# Patient Record
Sex: MALE | Race: WHITE | NOT HISPANIC OR LATINO | Employment: FULL TIME | URBAN - METROPOLITAN AREA
[De-identification: names, ages, dates, MRNs, and addresses within clinical notes are randomized per-mention and may not be internally consistent; named-entity substitution may affect disease eponyms.]

---

## 2017-05-15 ENCOUNTER — OFFICE VISIT (OUTPATIENT)
Dept: FAMILY MEDICINE | Facility: CLINIC | Age: 52
End: 2017-05-15
Payer: COMMERCIAL

## 2017-05-15 VITALS
HEIGHT: 68 IN | TEMPERATURE: 97.6 F | SYSTOLIC BLOOD PRESSURE: 123 MMHG | HEART RATE: 56 BPM | OXYGEN SATURATION: 98 % | DIASTOLIC BLOOD PRESSURE: 79 MMHG | WEIGHT: 170.4 LBS | BODY MASS INDEX: 25.82 KG/M2

## 2017-05-15 DIAGNOSIS — J30.2 SEASONAL ALLERGIC RHINITIS, UNSPECIFIED ALLERGIC RHINITIS TRIGGER: ICD-10-CM

## 2017-05-15 DIAGNOSIS — Z13.1 SCREENING FOR DIABETES MELLITUS: ICD-10-CM

## 2017-05-15 DIAGNOSIS — F51.01 PRIMARY INSOMNIA: ICD-10-CM

## 2017-05-15 DIAGNOSIS — Z13.220 SCREENING CHOLESTEROL LEVEL: ICD-10-CM

## 2017-05-15 DIAGNOSIS — J33.9 NASAL POLYP: ICD-10-CM

## 2017-05-15 DIAGNOSIS — Z12.11 SPECIAL SCREENING FOR MALIGNANT NEOPLASMS, COLON: ICD-10-CM

## 2017-05-15 DIAGNOSIS — Z00.00 ROUTINE HEALTH MAINTENANCE: Primary | ICD-10-CM

## 2017-05-15 DIAGNOSIS — J45.20 MILD INTERMITTENT ASTHMA WITHOUT COMPLICATION: ICD-10-CM

## 2017-05-15 LAB
CHOLEST SERPL-MCNC: 188 MG/DL
GLUCOSE SERPL-MCNC: 101 MG/DL (ref 70–99)
HDLC SERPL-MCNC: 53 MG/DL
LDLC SERPL CALC-MCNC: 116 MG/DL
NONHDLC SERPL-MCNC: 135 MG/DL
TRIGL SERPL-MCNC: 93 MG/DL

## 2017-05-15 PROCEDURE — 99213 OFFICE O/P EST LOW 20 MIN: CPT | Mod: 25 | Performed by: INTERNAL MEDICINE

## 2017-05-15 PROCEDURE — 82947 ASSAY GLUCOSE BLOOD QUANT: CPT | Performed by: INTERNAL MEDICINE

## 2017-05-15 PROCEDURE — 36415 COLL VENOUS BLD VENIPUNCTURE: CPT | Performed by: INTERNAL MEDICINE

## 2017-05-15 PROCEDURE — 80061 LIPID PANEL: CPT | Performed by: INTERNAL MEDICINE

## 2017-05-15 PROCEDURE — 99396 PREV VISIT EST AGE 40-64: CPT | Performed by: INTERNAL MEDICINE

## 2017-05-15 RX ORDER — ALBUTEROL SULFATE 90 UG/1
2 AEROSOL, METERED RESPIRATORY (INHALATION) EVERY 4 HOURS PRN
Qty: 1 INHALER | Refills: 11 | Status: SHIPPED | OUTPATIENT
Start: 2017-05-15 | End: 2018-08-22

## 2017-05-15 RX ORDER — PREDNISONE 20 MG/1
TABLET ORAL
Qty: 20 TABLET | Refills: 0 | Status: SHIPPED | OUTPATIENT
Start: 2017-05-15 | End: 2018-02-15

## 2017-05-15 NOTE — PROGRESS NOTES
SUBJECTIVE:     CC: Marco Stovall is an 51 year old male who presents for preventative health visit.     Healthy Habits:    Do you get at least three servings of calcium containing foods daily (dairy, green leafy vegetables, etc.)? yes    Amount of exercise or daily activities, outside of work: 3 day(s) per week -  Running     Problems taking medications regularly not applicable    Medication side effects: No    Have you had an eye exam in the past two years? no    Do you see a dentist twice per year? 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 His asthma has been worse recently due to allergies and marathon training.  He is using Flonase and an OTC antihistamine.  Using albuterol inhaler a few times per week.  He is interested in aspirin desensitization.  He has taking a prednisone taper in the past with relief.      Insomnia- Marco has trouble falling asleep at night and has one alcoholic drink per evening to help with this.  He has tried melatonin with no improvement.  Benadryl has a paradoxical effect on him.        ACT Total Scores 6/13/2014 3/24/2016 5/15/2017   ACT TOTAL SCORE 22 - -   ASTHMA ER VISITS 0 = None - -   ASTHMA HOSPITALIZATIONS 0 = None - -   ACT TOTAL SCORE (Goal Greater than or Equal to 20) - 21 18   In the past 12 months, how many times did you visit the emergency room for your asthma without being admitted to the hospital? - 0 0   In the past 12 months, how many times were you hospitalized overnight because of your asthma? - 0 0     colonoscopy    Today's PHQ-2 Score:   PHQ-2 ( 1999 [employer]) 5/15/2017 3/24/2016   Q1: Little interest or pleasure in doing things 0 0   Q2: Feeling down, depressed or hopeless 0 0   PHQ-2 Score 0 0       Abuse: Current or Past(Physical, Sexual or Emotional)- No  Do you feel safe in your environment - Yes    Social History    Substance Use Topics     Smoking status: Never Smoker     Smokeless tobacco: Never Used     Alcohol use Yes      Comment: wine or mixed 2 a month          Standardized Alcohol Screening Questionnaire  AUDIT   Questions 0 1 2 3 4 Score   1. How often do you have a drink  containing alcohol? Never Monthly or less 2 to 4  times a  month 2 to 3  times a  week 4 or more  times a  week  4   2. How many drinks containing alcohol  do you have on a typical day when you are drinking? 1 or 2 3 or 4 5 or 6 7 to 9 10 or more  0   3. How often do you have more than five  or more drinks on one occasion? Never Less  than  monthly Monthly Weekly Daily or  almost  daily  0   4. How often during the last year have  you found that you were not able to stop drinking once you had started? Never Less  than  monthly Monthly Weekly Daily or  almost  daily  0   5. How often during the last year have  you failed to do what was normally expected of you because of drinking? Never Less  than  monthly Monthly Weekly Daily or  almost  daily  0   6. How often during the last year have  you needed a first drink in the morning to get yourself going after a heavy drinking session? Never Less  than  monthly Monthly Weekly Daily or  almost  daily  0   7. How often during the last year have you had a feeling of guilt or remorse after drinking? Never Less  than  monthly Monthly Weekly Daily or  almost  daily  0   8. How often during the last year have  you been unable to remember what happened the night before because of your drinking? Never Less  than  monthly Monthly Weekly Daily or  almost  daily  0   9. Have you or someone else been  injured because of your drinking? No  Yes, but not in the last year  Yes,  during the  last year  0   10. Has a relative, friend, doctor or other health care worker been concerned about your drinking or suggested you cut down? No  Yes, but not in the last year  Yes,  during the  last year  0   Total  4   Scoring: A  score of 7 for adult men is an indication of hazardous drinking (risk for physical or physiological harm); a score of 8 or more is an indication of an alcohol use disorder. A score of 5 or more for adult women  is an indication of hazardous drinking or an alcohol use disorder.       Last PSA: No results found for: PSA    Recent Labs   Lab Test  07/15/11   0933   CHOL  217*   HDL  41   LDL  160*   TRIG  74   CHOLHDLRATIO  5.0       Reviewed orders with patient. Reviewed health maintenance and updated orders accordingly - Yes    Reviewed and updated as needed this visit by clinical staff  Tobacco  Allergies  Med Hx  Surg Hx  Fam Hx  Soc Hx        Reviewed and updated as needed this visit by Provider        Past Medical History:   Diagnosis Date     Mild intermittent asthma without complication 3/24/2016     Polyp of nasal cavity       Past Surgical History:   Procedure Laterality Date     ENT SURGERY       ETHMOIDECTOMY  6/16/2014    Procedure: ETHMOIDECTOMY;  Surgeon: Jimbo Yang MD;  Location: [facility]     SURGICAL HISTORY OF -   2010    Nasal Polyp; 3 total surgeries: 1997       ROS:  C: NEGATIVE for fever, chills, change in weight  I: NEGATIVE for worrisome rashes, moles or lesions  E: NEGATIVE for vision changes or irritation  ENT: Positive for allergic symptoms  R: Positive for asthma symptoms  CV: NEGATIVE for chest pain, palpitations or peripheral edema  GI: NEGATIVE for nausea, abdominal pain, heartburn, or change in bowel habits   male: negative for dysuria, hematuria, decreased urinary stream, erectile dysfunction, urethral discharge  M: NEGATIVE for significant arthralgias or myalgia  N: NEGATIVE for weakness, dizziness or paresthesias  P: NEGATIVE for changes in mood or affect    Current Outpatient Prescriptions   Medication Sig Dispense Refill     albuterol (PROAIR HFA/PROVENTIL HFA/VENTOLIN HFA) 108 (90 BASE) MCG/ACT Inhaler Inhale 2 puffs into the lungs every 4 hours as needed for  "shortness of breath / dyspnea 1 Inhaler 11     predniSONE (DELTASONE) 20 MG tablet Take 3 tabs (60 mg) by mouth daily x 3 days, 2 tabs (40 mg) daily x 3 days, 1 tab (20 mg) daily x 3 days, then 1/2 tab (10 mg) x 3 days. 20 tablet 0     Multiple Vitamin (MULTIVITAMIN) per tablet Take 1 tablet by mouth daily. 100 tablet 12     sildenafil (VIAGRA) 100 MG tablet Take 0.5-1 tablets ( mg) by mouth daily as needed for erectile dysfunction Take 30 min to 4 hours before intercourse. 6 tablet 11     [DISCONTINUED] albuterol (PROAIR HFA, PROVENTIL HFA, VENTOLIN HFA) 108 (90 BASE) MCG/ACT inhaler Inhale 2 puffs into the lungs every 4 hours as needed for shortness of breath / dyspnea 1 Inhaler 11     Allergies   Allergen Reactions     Advil [Ibuprofen Micronized] Other (See Comments)     Tight chest     Asa [Aspirin] Nausea and Vomiting     Asthma - tight chest     Naprosyn [Naproxen] GI Disturbance     Stomach      OBJECTIVE:     /79 (BP Location: Right arm, Patient Position: Chair, Cuff Size: Adult Regular)  Pulse 56  Temp 97.6  F (36.4  C) (Tympanic)  Ht 5' 7.5\" (1.715 m)  Wt 170 lb 6.4 oz (77.3 kg)  SpO2 98%  BMI 26.29 kg/m2  EXAM:  GENERAL: healthy, alert and no distress  EYES: Eyes grossly normal to inspection, PERRL and conjunctivae and sclerae normal  HENT: ear canals and TM's normal, nose and mouth without ulcers or lesions  NECK: no adenopathy, no asymmetry, masses, or scars and thyroid normal to palpation  RESP: lungs clear to auscultation - no rales, rhonchi or wheezes  CV: regular rate and rhythm, normal S1 S2, no S3 or S4, no murmur, click or rub, no peripheral edema   ABDOMEN: soft, nontender, no hepatosplenomegaly, no masses and bowel sounds normal  MS: no gross musculoskeletal defects noted, no edema  SKIN: no suspicious lesions or rashes evident on clothed exam- full skin exam declined by patient  NEURO: Normal strength and tone, mentation intact and speech normal  PSYCH: mentation appears " normal, affect normal/bright    ASSESSMENT/PLAN:     1. Routine health maintenance  2. Screening cholesterol level  3. Screening for diabetes mellitus  4. Screening for colon cancer      Immunizations: [facility]    Lab Studies: lipids and glucose    Colonoscopy: ordered       5. Mild intermittent asthma without complication  6. Seasonal allergic rhinitis, unspecified allergic rhinitis trigger  6. Nasal polyp    Asthma is not particularly well controlled currently.  Discussed resuming daily steroid inhaler, but he wishes to meet with allergist first to discuss aspirin desensitization therapy.  Is currently taking Flonase and OTC antihistamine for allergies.  He reports that he has been on prednisone taper in the past when he gets a flare of his nasal symptoms and he would like to try this again, so order placed.      - albuterol (PROAIR HFA/PROVENTIL HFA/VENTOLIN HFA) 108 (90 BASE) MCG/ACT Inhaler; Inhale 2 puffs into the lungs every 4 hours as needed for shortness of breath / dyspnea  Dispense: 1 Inhaler; Refill: 11  - ALLERGY/ASTHMA ADULT REFERRAL  - predniSONE (DELTASONE) 20 MG tablet; Take 3 tabs (60 mg) by mouth daily x 3 days, 2 tabs (40 mg) daily x 3 days, 1 tab (20 mg) daily x 3 days, then 1/2 tab (10 mg) x 3 days.  Dispense: 20 tablet; Refill: 0    7. Primary insomnia    He has one alcoholic drink nightly before bed to help with sleep and enquires about other sleep aids.  He has tried melatonin with no relief and has paradoxical effect from Benadryl.  We discussed that prescription sleep medications are usually just effective short term and that working on sleep hygiene is best for long term, and he is going to work on improving his sleep hygiene.       COUNSELING:  Reviewed preventive health counseling, as reflected in patient instructions     reports that he has never smoked. He has never used smokeless tobacco.    Estimated body mass index is 26.29 kg/(m^2) as calculated from the following:    Height as of this  "encounter: 5' 7.5\" (1.715 m).    Weight as of this encounter: 170 lb 6.4 oz (77.3 kg).       Rodger Cox MD  [facility]  "

## 2017-05-15 NOTE — NURSING NOTE
"Chief Complaint   Patient presents with     Physical       Initial /79 (BP Location: Right arm, Patient Position: Chair, Cuff Size: Adult Regular)  Pulse 56  Temp 97.6  F (36.4  C) (Tympanic)  Ht 5' 7.5\" (1.715 m)  Wt 170 lb 6.4 oz (77.3 kg)  SpO2 98%  BMI 26.29 kg/m2 Estimated body mass index is 26.29 kg/(m^2) as calculated from the following:    Height as of this encounter: 5' 7.5\" (1.715 m).    Weight as of this encounter: 170 lb 6.4 oz (77.3 kg).  Medication Reconciliation: complete   Nava PLATA CMA (AAMA)    "

## 2017-05-15 NOTE — LETTER
"[facility]  [address] [address]  Phone: [phone]    May 15, 2017    Marco FERRELL Wilman  [address] [place] [id number]          Dear Mr. Solister,    The results of your recent lab tests were: blood sugar is just slightly above the normal range, stable from where it has been the past 5 years.  LDL \"bad\" cholesterol is slightly high, but improved from 5 years ago.  HDL and triglyceride levels are good.   Component      Latest Ref Rng & Units 5/15/2017   Cholesterol      <200 mg/dL 188   Triglycerides      <150 mg/dL 93   HDL Cholesterol      >39 mg/dL 53   LDL Cholesterol Calculated      <100 mg/dL 116 (H)   Non HDL Cholesterol      <130 mg/dL 135 (H)   Glucose      70 - 99 mg/dL 101 (H)     If you have any further questions or problems, please contact our office.    Sincerely,      Andrew Andrade MD / lavon          "

## 2017-05-15 NOTE — PATIENT INSTRUCTIONS
Sleep Hygeine Information (FV, 1 page)     Preventive Health Recommendations  Male Ages 50 - 64    Yearly exam:             See your health care provider every year in order to  o   Review health changes.   o   Discuss preventive care.    o   Review your medicines if your doctor has prescribed any.     Have a cholesterol test every 5 years, or more frequently if you are at risk for high cholesterol/heart disease.     Have a diabetes test (fasting glucose) every three years. If you are at risk for diabetes, you should have this test more often.     Have a colonoscopy at age 50, or have a yearly FIT test (stool test). These exams will check for colon cancer.      Talk with your health care provider about whether or not a prostate cancer screening test (PSA) is right for you.    You should be tested each year for STDs (sexually transmitted diseases), if you re at risk.     Shots: Get a flu shot each year. Get a tetanus shot every 10 years.     Nutrition:    Eat at least 5 servings of fruits and vegetables daily.     Eat whole-grain bread, whole-wheat pasta and brown rice instead of white grains and rice.     Talk to your provider about Calcium and Vitamin D.     Lifestyle    Exercise for at least 150 minutes a week (30 minutes a day, 5 days a week). This will help you control your weight and prevent disease.     Limit alcohol to one drink per day.     No smoking.     Wear sunscreen to prevent skin cancer.     See your dentist every six months for an exam and cleaning.     See your eye doctor every 1 to 2 years.

## 2017-05-15 NOTE — LETTER
My Asthma Action Plan  Name: Marco Stovall   YOB: 1965  Date: 5/15/2017   My doctor: Rodger Cox MD   My clinic: [facility]        My Control Medicine: none  My Rescue Medicine: Albuterol (Proair/Ventolin/Proventil) inhaler     My Asthma Severity: intermittent  Avoid your asthma triggers: exercise or sports and allergens               GREEN ZONE     Good Control    I feel good    No cough or wheeze    Can work, sleep and play without asthma symptoms       Take your asthma control medicine every day.     1. If exercise triggers your asthma, take your rescue medication    15 minutes before exercise or sports, and    During exercise if you have asthma symptoms  2. Spacer to use with inhaler: If you have a spacer, make sure to use it with your inhaler             YELLOW ZONE     Getting Worse  I have ANY of these:    I do not feel good    Cough or wheeze    Chest feels tight    Wake up at night   1. Keep taking your Green Zone medications  2. Start taking your rescue medicine:    every 20 minutes for up to 1 hour. Then every 4 hours for 24-48 hours.  3. If you stay in the Yellow Zone for more than 12-24 hours, contact your doctor.  4. If you do not return to the Green Zone in 12-24 hours or you get worse, start taking your oral steroid medicine if prescribed by your provider.           RED ZONE     Medical Alert - Get Help  I have ANY of these:    I feel awful    Medicine is not helping    Breathing getting harder    Trouble walking or talking    Nose opens wide to breathe       1. Take your rescue medicine NOW  2. If your provider has prescribed an oral steroid medicine, start taking it NOW  3. Call your doctor NOW  4. If you are still in the Red Zone after 20 minutes and you have not reached your doctor:    Take your rescue medicine again and    Call 911 or go to the emergency room right away    See your regular doctor within 2 weeks of an Emergency Room or Urgent Care visit for  follow-up treatment.        Electronically signed by: Rodger Cox, May 15, 2017    Annual Reminders:  Meet with Asthma Educator,  Flu Shot in the Fall, consider Pneumonia Vaccination for patients with asthma (aged 19 and older).    Pharmacy:    [facility] [place] - [address] [id number] - [place], [place] - [address]                    Asthma Triggers  How To Control Things That Make Your Asthma Worse    Triggers are things that make your asthma worse.  Look at the list below to help you find your triggers and what you can do about them.  You can help prevent asthma flare-ups by staying away from your triggers.      Trigger                                                          What you can do   Cigarette Smoke  Tobacco smoke can make asthma worse. Do not allow smoking in your home, car or around you.  Be sure no one smokes at a child s day care or school.  If you smoke, ask your health care provider for ways to help you quit.  Ask family members to quit too.  Ask your health care provider for a referral to Quit Plan to help you quit smoking, or call 6-605-415-PLAN.     Colds, Flu, Bronchitis  These are common triggers of asthma. Wash your hands often.  Don t touch your eyes, nose or mouth.  Get a flu shot every year.     Dust Mites  These are tiny bugs that live in cloth or carpet. They are too small to see. Wash sheets and blankets in hot water every week.   Encase pillows and mattress in dust mite proof covers.  Avoid having carpet if you can. If you have carpet, vacuum weekly.   Use a dust mask and HEPA vacuum.   Pollen and Outdoor Mold  Some people are allergic to trees, grass, or weed pollen, or molds. Try to keep your windows closed.  Limit time out doors when pollen count is high.   Ask you health care provider about taking medicine during allergy season.     Animal Dander  Some people are allergic to skin flakes, urine or saliva from pets with fur or  feathers. Keep pets with fur or feathers out of your home.    If you can t keep the pet outdoors, then keep the pet out of your bedroom.  Keep the bedroom door closed.  Keep pets off cloth furniture and away from stuffed toys.     Mice, Rats, and Cockroaches  Some people are allergic to the waste from these pests.   Cover food and garbage.  Clean up spills and food crumbs.  Store grease in the refrigerator.   Keep food out of the bedroom.   Indoor Mold  This can be a trigger if your home has high moisture. Fix leaking faucets, pipes, or other sources of water.   Clean moldy surfaces.  Dehumidify basement if it is damp and smelly.   Smoke, Strong Odors, and Sprays  These can reduce air quality. Stay away from strong odors and sprays, such as perfume, powder, hair spray, paints, smoke incense, paint, cleaning products, candles and new carpet.   Exercise or Sports  Some people with asthma have this trigger. Be active!  Ask your doctor about taking medicine before sports or exercise to prevent symptoms.    Warm up for 5-10 minutes before and after sports or exercise.     Other Triggers of Asthma  Cold air:  Cover your nose and mouth with a scarf.  Sometimes laughing or crying can be a trigger.  Some medicines and food can trigger asthma.

## 2017-05-15 NOTE — MR AVS SNAPSHOT
After Visit Summary   5/15/2017    Marco Stovall    MRN: [id number]           Patient Information     Date Of Birth          1965        Visit Information        Provider Department      5/15/2017 9:00 AM Rodger Cox MD [facility]        Today's Diagnoses     Routine health maintenance    -  1    Mild intermittent asthma without complication        Seasonal allergic rhinitis, unspecified allergic rhinitis trigger        Nasal polyp        Screening cholesterol level        Screening for diabetes mellitus          Care Instructions      Sleep Hygeine Information (FV, 1 page)     Preventive Health Recommendations  Male Ages 50 - 64    Yearly exam:             See your health care provider every year in order to  o   Review health changes.   o   Discuss preventive care.    o   Review your medicines if your doctor has prescribed any.     Have a cholesterol test every 5 years, or more frequently if you are at risk for high cholesterol/heart disease.     Have a diabetes test (fasting glucose) every three years. If you are at risk for diabetes, you should have this test more often.     Have a colonoscopy at age 50, or have a yearly FIT test (stool test). These exams will check for colon cancer.      Talk with your health care provider about whether or not a prostate cancer screening test (PSA) is right for you.    You should be tested each year for STDs (sexually transmitted diseases), if you re at risk.     Shots: Get a flu shot each year. Get a tetanus shot every 10 years.     Nutrition:    Eat at least 5 servings of fruits and vegetables daily.     Eat whole-grain bread, whole-wheat pasta and brown rice instead of white grains and rice.     Talk to your provider about Calcium and Vitamin D.     Lifestyle    Exercise for at least 150 minutes a week (30 minutes a day, 5 days a week). This will help you control your weight and prevent disease.     Limit alcohol to one drink per day.  "    No smoking.     Wear sunscreen to prevent skin cancer.     See your dentist every six months for an exam and cleaning.     See your eye doctor every 1 to 2 years.          Follow-ups after your visit        Additional Services     ALLERGY/ASTHMA ADULT REFERRAL       Your provider has referred you to: [facility]: [facility] [phone] [email or web address]    Please be aware that coverage of these services is subject to the terms and limitations of your health insurance plan.  Call member services at your health plan with any benefit or coverage questions.      Please bring the following with you to your appointment:    (1) Any X-Rays, CTs or MRIs which have been performed.  Contact the facility where they were done to arrange for  prior to your scheduled appointment.    (2) List of current medications  (3) This referral request   (4) Any documents/labs given to you for this referral                  Who to contact     If you have questions or need follow up information about today's clinic visit or your schedule please contact [facility] directly at [phone].  Normal or non-critical lab and imaging results will be communicated to you by MyChart, letter or phone within 4 business days after the clinic has received the results. If you do not hear from us within 7 days, please contact the clinic through [employer] or phone. If you have a critical or abnormal lab result, we will notify you by phone as soon as possible.  Submit refill requests through [employer] or call your pharmacy and they will forward the refill request to us. Please allow 3 business days for your refill to be completed.          Additional Information About Your Visit        [employer] Information     [employer] lets you send messages to your doctor, view your test results, renew your prescriptions, schedule appointments and more. To sign up, go to [email or web address] . Click on \"Log in\" on " "the left side of the screen, which will take you to the Welcome page. Then click on \"Sign up Now\" on the right side of the page.     You will be asked to enter the access code listed below, as well as some personal information. Please follow the directions to create your username and password.     Your access code is: [email or web address]  Expires: 2017  9:39 AM     Your access code will  in 90 days. If you need help or a new code, please call your [facility] clinic or [phone].        Care EveryWhere ID     This is your Care EveryWhere ID. This could be used by other organizations to access your [facility] medical records  [id number]        Your Vitals Were     Pulse Temperature Height Pulse Oximetry BMI (Body Mass Index)       56 97.6  F (36.4  C) (Tympanic) 5' 7.5\" (1.715 m) 98% 26.29 kg/m2        Blood Pressure from Last 3 Encounters:   05/15/17 123/79   16 133/77   16 141/85    Weight from Last 3 Encounters:   05/15/17 170 lb 6.4 oz (77.3 kg)   16 187 lb (84.8 kg)   16 198 lb (89.8 kg)              We Performed the Following     ALLERGY/ASTHMA ADULT REFERRAL     Glucose     Lipid Profile          Today's Medication Changes          These changes are accurate as of: 5/15/17  9:39 AM.  If you have any questions, ask your nurse or doctor.               Start taking these medicines.        Dose/Directions    predniSONE 20 MG tablet   Commonly known as:  DELTASONE   Used for:  Seasonal allergic rhinitis, unspecified allergic rhinitis trigger, Nasal polyp   Started by:  Rodger Cox MD        Take 3 tabs (60 mg) by mouth daily x 3 days, 2 tabs (40 mg) daily x 3 days, 1 tab (20 mg) daily x 3 days, then 1/2 tab (10 mg) x 3 days.   Quantity:  20 tablet   Refills:  0            Where to get your medicines      These medications were sent to [facility] [place], [place] - [address]  [address] [address]     Phone:  [phone]     albuterol 108 (90 BASE) " MCG/ACT Inhaler    predniSONE 20 MG tablet                Primary Care Provider Office Phone # Fax #    Andrew Serg Andrade [phone]       [facility] [address] [address]        Thank you!     Thank you for choosing [facility]  for your care. Our goal is always to provide you with excellent care. Hearing back from our patients is one way we can continue to improve our services. Please take a few minutes to complete the written survey that you may receive in the mail after your visit with us. Thank you!             Your Updated Medication List - Protect others around you: Learn how to safely use, store and throw away your medicines at www.disposemymeds.org.          This list is accurate as of: 5/15/17  9:39 AM.  Always use your most recent med list.                   Brand Name Dispense Instructions for use    albuterol 108 (90 BASE) MCG/ACT Inhaler    PROAIR HFA/PROVENTIL HFA/VENTOLIN HFA    1 Inhaler    Inhale 2 puffs into the lungs every 4 hours as needed for shortness of breath / dyspnea       multivitamin per tablet     100 tablet    Take 1 tablet by mouth daily.       predniSONE 20 MG tablet    DELTASONE    20 tablet    Take 3 tabs (60 mg) by mouth daily x 3 days, 2 tabs (40 mg) daily x 3 days, 1 tab (20 mg) daily x 3 days, then 1/2 tab (10 mg) x 3 days.       sildenafil 100 MG cap/tab    VIAGRA    6 tablet    Take 0.5-1 tablets ( mg) by mouth daily as needed for erectile dysfunction Take 30 min to 4 hours before intercourse.

## 2017-05-16 ASSESSMENT — ASTHMA QUESTIONNAIRES: ACT_TOTALSCORE: 18

## 2017-06-22 ENCOUNTER — TELEPHONE (OUTPATIENT)
Dept: FAMILY MEDICINE | Facility: CLINIC | Age: 52
End: 2017-06-22

## 2017-06-22 NOTE — LETTER
[facility]  [address] [address]  Phone: [phone]    June 22, 2017    Marco MARIAELENA Stovall  [address] [place] [address]              Dear Mr. Solister,    Your [facility] Care Team works hard to make sure that you and your family receive exceptional care. Enclosed you will find a copy of the Asthma Control Test (ACT) that our clinic uses to monitor and manage your asthma. This test is an assessment tool that we use to determine how well your asthma is controlled.  Please keep a copy of the ACT for your reference when we call you to complete this assessment.   We will call you within the next week to review the ACT.    Thank you for trusting us with your health care.    Sincerely,      Your [facility] Team/pati

## 2017-06-22 NOTE — TELEPHONE ENCOUNTER
Patient is due for ACT.  Last done 5/15/17 with a score of 18.  Per office visit from 5/15/17:    5. Mild intermittent asthma without complication  6. Seasonal allergic rhinitis, unspecified allergic rhinitis trigger  6. Nasal polyp     Asthma is not particularly well controlled currently.  Discussed resuming daily steroid inhaler, but he wishes to meet with allergist first to discuss aspirin desensitization therapy.  Is currently taking Flonase and OTC antihistamine for allergies.  He reports that he has been on prednisone taper in the past when he gets a flare of his nasal symptoms and he would like to try this again, so order placed.      Letter mailed to patient with ACT.

## 2017-06-22 NOTE — LETTER
[facility]  [address] [address]  Phone: [phone]    July 13, 2017    Marco FERRELL Wilman  [address] [place] [address]              Dear Gricel Wilman,    Your [facility] Care Team works hard to make sure that you and your family receive exceptional care. Enclosed you will find a copy of the Asthma Control Test (ACT) that our clinic uses to monitor and manage your asthma. This test is an assessment tool that we use to determine how well your asthma is controlled.  Please complete the enclosed ACT and return in the provided envelope.    Our records also indicate you are do for a colonoscopy.  This is a colon cancer screening test that is recommended beginning at age 50.  Please contact the clinic for assistance in scheduling this test.    Thank you for trusting us with your health care.      Sincerely,      Your [facility] Care Team/pati

## 2017-07-13 NOTE — TELEPHONE ENCOUNTER
Panel Management Review      Patient has the following on his problem list:     Asthma review     ACT Total Scores 5/15/2017   ACT TOTAL SCORE -   ASTHMA ER VISITS -   ASTHMA HOSPITALIZATIONS -   ACT TOTAL SCORE (Goal Greater than or Equal to 20) 18   In the past 12 months, how many times did you visit the emergency room for your asthma without being admitted to the hospital? 0   In the past 12 months, how many times were you hospitalized overnight because of your asthma? 0      1. Is Asthma diagnosis on the Problem List? Yes    2. Is Asthma listed on Health Maintenance? Yes    3. Patient is due for:  ACT      Composite cancer screening  Chart review shows that this patient is due/due soon for the following Colonoscopy  Summary:    Patient is due/failing the following:   ACT    Action needed:   Patient needs to do ACT.    Type of outreach:    Sent letter.   Letter with ACT mailed for patient to complete and return.  Also reminded due for colonoscopy.    Questions for provider review:    None                                                                                                                                    AYSHA Garnett, CMA

## 2017-11-02 ENCOUNTER — OFFICE VISIT (OUTPATIENT)
Dept: FAMILY MEDICINE | Facility: CLINIC | Age: 52
End: 2017-11-02
Payer: COMMERCIAL

## 2017-11-02 VITALS
TEMPERATURE: 96.7 F | SYSTOLIC BLOOD PRESSURE: 116 MMHG | HEART RATE: 67 BPM | WEIGHT: 162 LBS | HEIGHT: 68 IN | BODY MASS INDEX: 24.55 KG/M2 | DIASTOLIC BLOOD PRESSURE: 83 MMHG

## 2017-11-02 DIAGNOSIS — Z23 NEED FOR PROPHYLACTIC VACCINATION AND INOCULATION AGAINST INFLUENZA: ICD-10-CM

## 2017-11-02 DIAGNOSIS — Z12.11 COLON CANCER SCREENING: ICD-10-CM

## 2017-11-02 DIAGNOSIS — N52.9 ERECTILE DYSFUNCTION, UNSPECIFIED ERECTILE DYSFUNCTION TYPE: ICD-10-CM

## 2017-11-02 DIAGNOSIS — Z11.3 SCREEN FOR STD (SEXUALLY TRANSMITTED DISEASE): Primary | ICD-10-CM

## 2017-11-02 LAB — HIV 1+2 AB+HIV1 P24 AG SERPL QL IA: NONREACTIVE

## 2017-11-02 PROCEDURE — 87491 CHLMYD TRACH DNA AMP PROBE: CPT | Performed by: FAMILY MEDICINE

## 2017-11-02 PROCEDURE — 99214 OFFICE O/P EST MOD 30 MIN: CPT | Performed by: FAMILY MEDICINE

## 2017-11-02 PROCEDURE — 90471 IMMUNIZATION ADMIN: CPT | Performed by: FAMILY MEDICINE

## 2017-11-02 PROCEDURE — 86696 HERPES SIMPLEX TYPE 2 TEST: CPT | Performed by: FAMILY MEDICINE

## 2017-11-02 PROCEDURE — 87389 HIV-1 AG W/HIV-1&-2 AB AG IA: CPT | Performed by: FAMILY MEDICINE

## 2017-11-02 PROCEDURE — 86695 HERPES SIMPLEX TYPE 1 TEST: CPT | Performed by: FAMILY MEDICINE

## 2017-11-02 PROCEDURE — 90686 IIV4 VACC NO PRSV 0.5 ML IM: CPT | Performed by: FAMILY MEDICINE

## 2017-11-02 PROCEDURE — 86780 TREPONEMA PALLIDUM: CPT | Performed by: FAMILY MEDICINE

## 2017-11-02 PROCEDURE — 87591 N.GONORRHOEAE DNA AMP PROB: CPT | Performed by: FAMILY MEDICINE

## 2017-11-02 PROCEDURE — 36415 COLL VENOUS BLD VENIPUNCTURE: CPT | Performed by: FAMILY MEDICINE

## 2017-11-02 RX ORDER — SILDENAFIL CITRATE 20 MG/1
TABLET ORAL
Qty: 30 TABLET | Refills: 11 | Status: SHIPPED | OUTPATIENT
Start: 2017-11-02 | End: 2018-08-22

## 2017-11-02 NOTE — PROGRESS NOTES
"  SUBJECTIVE:   Marco Stovall is a 52 year old male who presents to clinic today for the following health issues:      STD TESTING:  Here to discuss about STD testing.  Patient states no exposure, he is just in a new relationship.  No hx of known STD's and no symptoms.     He is interested in a colonoscopy.     He needs a refill on the Viagra.       Current Outpatient Prescriptions:      albuterol (PROAIR HFA/PROVENTIL HFA/VENTOLIN HFA) 108 (90 BASE) MCG/ACT Inhaler, Inhale 2 puffs into the lungs every 4 hours as needed for shortness of breath / dyspnea, Disp: 1 Inhaler, Rfl: 11     predniSONE (DELTASONE) 20 MG tablet, Take 3 tabs (60 mg) by mouth daily x 3 days, 2 tabs (40 mg) daily x 3 days, 1 tab (20 mg) daily x 3 days, then 1/2 tab (10 mg) x 3 days., Disp: 20 tablet, Rfl: 0     sildenafil (VIAGRA) 100 MG tablet, Take 0.5-1 tablets ( mg) by mouth daily as needed for erectile dysfunction Take 30 min to 4 hours before intercourse., Disp: 6 tablet, Rfl: 11     Multiple Vitamin (MULTIVITAMIN) per tablet, Take 1 tablet by mouth daily., Disp: 100 tablet, Rfl: 12    Patient Active Problem List   Diagnosis     Screening for hypertension     Chronic maxillary sinusitis     Hyperlipidemia LDL goal <130     Mild intermittent asthma without complication       Blood pressure 116/83, pulse 67, temperature 96.7  F (35.9  C), temperature source Tympanic, height 5' 7.5\" (1.715 m), weight 162 lb (73.5 kg).    Exam:  GENERAL APPEARANCE: healthy, alert and no distress  EYES: EOMI,  PERRL  CV: regular rates and rhythm, normal S1 S2, no S3 or S4 and no murmur, click or rub -  GU_male: testicles normal without atrophy or masses, no hernias and penis normal without urethral discharge  PSYCH: mentation appears normal and affect normal/bright      (Z11.3) Screen for STD (sexually transmitted disease)  (primary encounter diagnosis)  Comment:   Plan: Anti Treponema, NEISSERIA GONORRHOEA PCR,         CHLAMYDIA TRACHOMATIS PCR, HIV " 1 and 2 Yancy         ([facility]), Herpes Simplex Virus 1 and 2 IgG        We will do the above tests and notify of the results. If these are negative then follow up as needed.     (Z12.11) Colon cancer screening  Comment:   Plan: COLORECTAL SURGERY REFERRAL        Call for the procedure. Monitor the stools for changes.     (N52.9) Erectile dysfunction, unspecified erectile dysfunction type  Comment:   Plan: sildenafil (REVATIO) 20 MG tablet        Instructions given on diagnoses and refill is done for one year. Use the smallest effective dose.     Andrew Andrade

## 2017-11-02 NOTE — PATIENT INSTRUCTIONS
Thank you for choosing [facility].  You may be receiving a survey in the mail from Augustine Yousif regarding your visit today.  Please take a few minutes to complete and return the survey to let us know how we are doing.      If you have questions or concerns, please contact us via [employer] or you can contact your care team at [phone].    Our Clinic hours are:  Monday 6:40 am  to 7:00 pm  Tuesday -Friday 6:40 am to 5:00 pm    The [place] outpatient lab hours are:  Monday - Friday 6:10 am to 4:45 pm  Saturdays 7:00 am to 11:00 am  Appointments are required, call [phone]    If you have clinical questions after hours or would like to schedule an appointment,  call the clinic at [phone].      (Z11.3) Screen for STD (sexually transmitted disease)  (primary encounter diagnosis)  Comment:   Plan: Anti Treponema, NEISSERIA GONORRHOEA PCR,         CHLAMYDIA TRACHOMATIS PCR, HIV 1 and 2 Yancy         ([facility]), Herpes Simplex Virus 1 and 2 IgG        We will do the above tests and notify of the results. If these are negative then follow up as needed.     (Z12.11) Colon cancer screening  Comment:   Plan: COLORECTAL SURGERY REFERRAL        Call for the procedure. Monitor the stools for changes.     (N52.9) Erectile dysfunction, unspecified erectile dysfunction type  Comment:   Plan: sildenafil (REVATIO) 20 MG tablet        Instructions given on diagnoses and refill is done for one year. Use the smallest effective dose.

## 2017-11-02 NOTE — NURSING NOTE
"Chief Complaint   Patient presents with     STD Testing     Here to discuss about testing.       Initial /83  Pulse 67  Temp 96.7  F (35.9  C) (Tympanic)  Ht 5' 7.5\" (1.715 m)  Wt 162 lb (73.5 kg)  BMI 25 kg/m2 Estimated body mass index is 25 kg/(m^2) as calculated from the following:    Height as of this encounter: 5' 7.5\" (1.715 m).    Weight as of this encounter: 162 lb (73.5 kg).  Medication Reconciliation: complete  "

## 2017-11-02 NOTE — MR AVS SNAPSHOT
After Visit Summary   11/2/2017    Marco Stovall    MRN: [id number]           Patient Information     Date Of Birth          1965        Visit Information        Provider Department      11/2/2017 6:40 AM Andrew Andrade MD [facility]        Today's Diagnoses     Screen for STD (sexually transmitted disease)    -  1    Colon cancer screening        Erectile dysfunction, unspecified erectile dysfunction type          Care Instructions          Thank you for choosing [facility].  You may be receiving a survey in the mail from [facility] regarding your visit today.  Please take a few minutes to complete and return the survey to let us know how we are doing.      If you have questions or concerns, please contact us via [employer] or you can contact your care team at [phone].    Our Clinic hours are:  Monday 6:40 am  to 7:00 pm  Tuesday -Friday 6:40 am to 5:00 pm    The [place] outpatient lab hours are:  Monday - Friday 6:10 am to 4:45 pm  Saturdays 7:00 am to 11:00 am  Appointments are required, call [phone]    If you have clinical questions after hours or would like to schedule an appointment,  call the clinic at [phone].      (Z11.3) Screen for STD (sexually transmitted disease)  (primary encounter diagnosis)  Comment:   Plan: Anti Treponema, NEISSERIA GONORRHOEA PCR,         CHLAMYDIA TRACHOMATIS PCR, HIV 1 and 2 Yancy         ([facility]), Herpes Simplex Virus 1 and 2 IgG        We will do the above tests and notify of the results. If these are negative then follow up as needed.     (Z12.11) Colon cancer screening  Comment:   Plan: COLORECTAL SURGERY REFERRAL        Call for the procedure. Monitor the stools for changes.     (N52.9) Erectile dysfunction, unspecified erectile dysfunction type  Comment:   Plan: sildenafil (REVATIO) 20 MG tablet        Instructions given on diagnoses and refill is done for one year. Use the smallest effective dose.            Follow-ups after your visit        Additional Services     COLORECTAL SURGERY REFERRAL       Your provider has referred you to: BULMARO Magallanes at [phone].     Referral ReasonsColon Cancer  Special concerns None  This referral is Elective (week +)  It is OK to leave a message on patient's voicemail.    Please be aware that coverage of these services is subject to the terms and limitations of your health insurance plan.  Call member services at your health plan with any benefit or coverage questions.      Please bring the following to your appointment:  >>   Any x-rays, CTs or MRIs which have been performed.  Contact the facility where they were done to arrange for  prior to your scheduled appointment.  Any new CT, MRI or other procedures ordered by your specialist must be performed at a [facility] facility or coordinated by your clinic's referral office.   >>   List of current medications  >>   This referral request   >>   Any documents/labs given to you for this referral                  Who to contact     If you have questions or need follow up information about today's clinic visit or your schedule please contact [facility] directly at [phone].  Normal or non-critical lab and imaging results will be communicated to you by [employer], letter or phone within 4 business days after the clinic has received the results. If you do not hear from us within 7 days, please contact the clinic through [employer] or phone. If you have a critical or abnormal lab result, we will notify you by phone as soon as possible.  Submit refill requests through [employer] or call your pharmacy and they will forward the refill request to us. Please allow 3 business days for your refill to be completed.          Additional Information About Your Visit        [employer] Information     [employer] lets you send messages to your doctor, view your test results, renew your prescriptions, schedule appointments and more. To sign up, go to  "[email or web address] . Click on \"Log in\" on the left side of the screen, which will take you to the Welcome page. Then click on \"Sign up Now\" on the right side of the page.     You will be asked to enter the access code listed below, as well as some personal information. Please follow the directions to create your username and password.     Your access code is: [email or web address]  Expires: 2018  7:22 AM     Your access code will  in 90 days. If you need help or a new code, please call your [place] clinic or [phone].        Care EveryWhere ID     This is your Care EveryWhere ID. This could be used by other organizations to access your [facility] medical records  [id number]        Your Vitals Were     Pulse Temperature Height BMI (Body Mass Index)          67 96.7  F (35.9  C) (Tympanic) 5' 7.5\" (1.715 m) 25 kg/m2         Blood Pressure from Last 3 Encounters:   17 116/83   05/15/17 123/79   16 133/77    Weight from Last 3 Encounters:   17 162 lb (73.5 kg)   05/15/17 170 lb 6.4 oz (77.3 kg)   16 187 lb (84.8 kg)              We Performed the Following     Anti Treponema     CHLAMYDIA TRACHOMATIS PCR     COLORECTAL SURGERY REFERRAL     Herpes Simplex Virus 1 and 2 IgG     HIV 1 and 2 Yancy ([facility])     NEISSERIA GONORRHOEA PCR          Today's Medication Changes          These changes are accurate as of: 17  7:22 AM.  If you have any questions, ask your nurse or doctor.               Start taking these medicines.        Dose/Directions    sildenafil 20 MG tablet   Commonly known as:  REVATIO   Used for:  Erectile dysfunction, unspecified erectile dysfunction type   Started by:  Andrew Andrade MD        Take 1 tablet (20 mg) by mouth three times daily for pulmonary hypertension.  Never use with nitroglycerin, terazosin or doxazosin.   Quantity:  30 tablet   Refills:  11            Where to get your medicines      These medications were sent to [facility] [place] - " [place], [place] - [address]  [address] [address]     Phone:  [phone]     sildenafil 20 MG tablet                Primary Care Provider Office Phone # Fax #    Andrew Andrade [phone]       [address] [address]        Equal Access to Services     OK YEAGER : Hadii aad ku hadasho Soomaali, waaxda luqadaha, qaybta kaalmada adeegyada, waxay idiin hayaan adeeg kharash laaniln . So [facility] [phone].    ATENCIÓN: Si habla español, tiene a adame disposición servicios gratuitos de asistencia lingüística. Lisbethame al [phone].    We comply with applicable federal civil rights laws and [place] laws. We do not discriminate on the basis of race, color, national origin, age, disability, sex, sexual orientation, or gender identity.            Thank you!     Thank you for choosing [facility]  for your care. Our goal is always to provide you with excellent care. Hearing back from our patients is one way we can continue to improve our services. Please take a few minutes to complete the written survey that you may receive in the mail after your visit with us. Thank you!             Your Updated Medication List - Protect others around you: Learn how to safely use, store and throw away your medicines at www.disposemymeds.org.          This list is accurate as of: 11/2/17  7:22 AM.  Always use your most recent med list.                   Brand Name Dispense Instructions for use Diagnosis    albuterol 108 (90 BASE) MCG/ACT Inhaler    PROAIR HFA/PROVENTIL HFA/VENTOLIN HFA    1 Inhaler    Inhale 2 puffs into the lungs every 4 hours as needed for shortness of breath / dyspnea    Mild intermittent asthma without complication       multivitamin per tablet     100 tablet    Take 1 tablet by mouth daily.    Routine general medical examination at a health care facility       predniSONE 20 MG tablet    DELTASONE    20 tablet    Take 3 tabs (60 mg) by mouth daily  x 3 days, 2 tabs (40 mg) daily x 3 days, 1 tab (20 mg) daily x 3 days, then 1/2 tab (10 mg) x 3 days.    Seasonal allergic rhinitis, unspecified allergic rhinitis trigger, Nasal polyp       sildenafil 100 MG tablet    VIAGRA    6 tablet    Take 0.5-1 tablets ( mg) by mouth daily as needed for erectile dysfunction Take 30 min to 4 hours before intercourse.    Erectile dysfunction, unspecified erectile dysfunction type       sildenafil 20 MG tablet    REVATIO    30 tablet    Take 1 tablet (20 mg) by mouth three times daily for pulmonary hypertension.  Never use with nitroglycerin, terazosin or doxazosin.    Erectile dysfunction, unspecified erectile dysfunction type

## 2017-11-02 NOTE — PROGRESS NOTES

## 2017-11-03 LAB
C TRACH DNA SPEC QL NAA+PROBE: NEGATIVE
HSV1 IGG SERPL QL IA: 0.3 AI (ref 0–0.8)
HSV2 IGG SERPL QL IA: <0.2 AI (ref 0–0.8)
N GONORRHOEA DNA SPEC QL NAA+PROBE: NEGATIVE
SPECIMEN SOURCE: NORMAL
SPECIMEN SOURCE: NORMAL
T PALLIDUM IGG+IGM SER QL: NEGATIVE

## 2017-11-03 ASSESSMENT — ASTHMA QUESTIONNAIRES: ACT_TOTALSCORE: 25

## 2017-11-08 ENCOUNTER — TELEPHONE (OUTPATIENT)
Dept: PEDIATRICS | Facility: CLINIC | Age: 52
End: 2017-11-08

## 2017-11-08 NOTE — TELEPHONE ENCOUNTER
Duplicate message.  Patient also has lab results notes and patient has been called and message left for him.    Elvia Gann RN

## 2017-11-08 NOTE — TELEPHONE ENCOUNTER
Reason for Call:  results    Detailed comments: labs from his office visit. I see they are resulted, but Dr. Andrade, has not seen them. Please call paitent with results, when they are read.    Phone Number Patient can be reached at: Home number on file [phone] (home)    Best Time: any    Can we leave a detailed message on this number? YES   Julia Beauchamp  [facility]      Call taken on 11/8/2017 at 1:55 PM by Julia Beauchamp

## 2017-11-30 ENCOUNTER — HOSPITAL ENCOUNTER (EMERGENCY)
Facility: CLINIC | Age: 52
Discharge: HOME | End: 2017-11-30
Attending: NURSE PRACTITIONER | Admitting: NURSE PRACTITIONER
Payer: COMMERCIAL

## 2017-11-30 VITALS
OXYGEN SATURATION: 97 % | HEART RATE: 73 BPM | BODY MASS INDEX: 25 KG/M2 | WEIGHT: 162 LBS | RESPIRATION RATE: 18 BRPM | TEMPERATURE: 99.4 F | DIASTOLIC BLOOD PRESSURE: 90 MMHG | SYSTOLIC BLOOD PRESSURE: 152 MMHG

## 2017-11-30 DIAGNOSIS — J02.0 ACUTE STREPTOCOCCAL PHARYNGITIS: ICD-10-CM

## 2017-11-30 LAB
INTERNAL QC OK POCT: YES
S PYO AG THROAT QL IA.RAPID: ABNORMAL

## 2017-11-30 PROCEDURE — 87880 STREP A ASSAY W/OPTIC: CPT | Performed by: PHYSICIAN ASSISTANT

## 2017-11-30 PROCEDURE — 99213 OFFICE O/P EST LOW 20 MIN: CPT

## 2017-11-30 PROCEDURE — 99213 OFFICE O/P EST LOW 20 MIN: CPT | Performed by: NURSE PRACTITIONER

## 2017-11-30 RX ORDER — PENICILLIN V POTASSIUM 500 MG/1
500 TABLET, FILM COATED ORAL 2 TIMES DAILY
Qty: 20 TABLET | Refills: 0 | Status: SHIPPED | OUTPATIENT
Start: 2017-11-30 | End: 2017-12-10

## 2017-11-30 NOTE — ED AVS SNAPSHOT
[facility] Emergency Department    [address] [address]    Phone:  [phone]    Fax:  [phone]                                       Marco Stovall   MRN: [id number]    Department:  [facility] Emergency Department   Date of Visit:  11/30/2017           After Visit Summary Signature Page     I have received my discharge instructions, and my questions have been answered. I have discussed any challenges I see with this plan with the nurse or doctor.    ..........................................................................................................................................  Patient/Patient Representative Signature      ..........................................................................................................................................  Patient Representative Print Name and Relationship to Patient    ..................................................               ................................................  Date                                            Time    ..........................................................................................................................................  Reviewed by Signature/Title    ...................................................              ..............................................  Date                                                            Time

## 2017-11-30 NOTE — ED AVS SNAPSHOT
[facility] Emergency Department    [address] [address]    Phone:  [phone]    Fax:  [phone]                                       Marco Stovall   MRN: [id number]    Department:  [facility] Emergency Department   Date of Visit:  11/30/2017           Patient Information     Date Of Birth          1965        Your diagnoses for this visit were:     Acute streptococcal pharyngitis        You were seen by Verna Mcmillan APRN CNP.      Follow-up Information     Follow up with Andrew Andrade MD.    Specialty:  Family Practice    Why:  As needed    Contact information:    [address] [address]  [phone]          Discharge Instructions         Pharyngitis: Strep (Confirmed)    You have had a positive test for strep throat. Strep throat is a contagious illness. It is spread by coughing, kissing or by touching others after touching your mouth or nose. Symptoms include throat pain that is worse with swallowing, aching all over, headache, and fever. It is treated with antibiotic medicine. This should help you start to feel better in 1 to 2 days.  Home care    Rest at home. Drink plenty of fluids to you won't get dehydrated.    No work or school for the first 2 days of taking the antibiotics. After this time, you will not be contagious. You can then return to school or work if you are feeling better.     Take antibiotic medicine for the full 10 days, even if you feel better. This is very important to ensure the infection is treated. It is also important to prevent medicine-resistant germs from developing. If you were given an antibiotic shot, you don't need any more antibiotics.    You may use acetaminophen or ibuprofen to control pain or fever, unless another medicine was prescribed for this. Talk with your doctor before taking these medicines if you have chronic liver or kidney disease. Also talk with your doctor if you have had a stomach  ulcer or GI bleeding.    Throat lozenges or sprays help reduce pain. Gargling with warm saltwater will also reduce throat pain. Dissolve 1/2 teaspoon of salt in 1 glass of warm water. This may be useful just before meals.     Soft foods are OK. Avoid salty or spicy foods.  Follow-up care  Follow up with your healthcare provider or our staff if you don't get better over the next week.  When to seek medical advice  Call your healthcare provider right away if any of these occur:    Fever of 100.4 F (38 C) or higher, or as directed by your healthcare provider    New or worsening ear pain, sinus pain, or headache    Painful lumps in the back of neck    Stiff neck    Lymph nodes getting larger or becoming soft in the middle    You can't swallow liquids or you can't open your mouth wide because of throat pain    Signs of dehydration. These include very dark urine or no urine, sunken eyes, and dizziness.    Trouble breathing or noisy breathing    Muffled voice    Rash  Date Last Reviewed: 4/13/2015 2000-2017 [employer]. [address] [place] [address]. All rights reserved. This information is not intended as a substitute for professional medical care. Always follow your healthcare professional's instructions.          24 Hour Appointment Hotline       To make an appointment at any [facility] clinic, call [phone] ([phone]). If you don't have a family doctor or clinic, we will help you find one. [facility] clinics are conveniently located to serve the needs of you and your family.             Review of your medicines      START taking        Dose / Directions Last dose taken    penicillin V potassium 500 MG tablet   Commonly known as:  VEETID   Dose:  500 mg   Quantity:  20 tablet        Take 1 tablet (500 mg) by mouth 2 times daily for 10 days   Refills:  0          Our records show that you are taking the medicines listed below. If these are incorrect, please call your family doctor or  clinic.        Dose / Directions Last dose taken    albuterol 108 (90 BASE) MCG/ACT Inhaler   Commonly known as:  PROAIR HFA/PROVENTIL HFA/VENTOLIN HFA   Dose:  2 puff   Quantity:  1 Inhaler        Inhale 2 puffs into the lungs every 4 hours as needed for shortness of breath / dyspnea   Refills:  11        multivitamin per tablet   Dose:  1 tablet   Quantity:  100 tablet        Take 1 tablet by mouth daily.   Refills:  12        predniSONE 20 MG tablet   Commonly known as:  DELTASONE   Quantity:  20 tablet        Take 3 tabs (60 mg) by mouth daily x 3 days, 2 tabs (40 mg) daily x 3 days, 1 tab (20 mg) daily x 3 days, then 1/2 tab (10 mg) x 3 days.   Refills:  0        sildenafil 100 MG tablet   Commonly known as:  VIAGRA   Dose:   mg   Quantity:  6 tablet        Take 0.5-1 tablets ( mg) by mouth daily as needed for erectile dysfunction Take 30 min to 4 hours before intercourse.   Refills:  11        sildenafil 20 MG tablet   Commonly known as:  REVATIO   Quantity:  30 tablet        Take 1 tablet (20 mg) by mouth three times daily for pulmonary hypertension.  Never use with nitroglycerin, terazosin or doxazosin.   Refills:  11                Prescriptions were sent or printed at these locations (1 Prescription)                   [facility] [address] [address]    Telephone:  [phone]   Fax:  [phone]   Hours:                  E-Prescribed (1 of 1)         penicillin V potassium (VEETID) 500 MG tablet                Procedures and tests performed during your visit     Rapid strep group A screen POCT      Orders Needing Specimen Collection     None      Pending Results     No orders found from 11/28/2017 to 12/1/2017.            Pending Culture Results     No orders found from 11/28/2017 to 12/1/2017.            Pending Results Instructions     If you had any lab results that were not finalized at the time of your Discharge, you can  "call the ED Lab Result RN at [phone]. You will be contacted by this team for any positive Lab results or changes in treatment. The nurses are available 7 days a week from 10A to 6:30P.  You can leave a message 24 hours per day and they will return your call.        Test Results From Your Hospital Stay        2017  6:17 PM      Component Results     Component Value Ref Range & Units Status    Rapid Strep A Screen POS neg Final    Internal QC OK Yes  Final                Thank you for choosing [facility]       Thank you for choosing [facility] for your care. Our goal is always to provide you with excellent care. Hearing back from our patients is one way we can continue to improve our services. Please take a few minutes to complete the written survey that you may receive in the mail after you visit with us. Thank you!        [employer] Information     [employer] lets you send messages to your doctor, view your test results, renew your prescriptions, schedule appointments and more. To sign up, go to [email or web address] . Click on \"Log in\" on the left side of the screen, which will take you to the Welcome page. Then click on \"Sign up Now\" on the right side of the page.     You will be asked to enter the access code listed below, as well as some personal information. Please follow the directions to create your username and password.     Your access code is: [email or web address]  Expires: 2018  6:22 AM     Your access code will  in 90 days. If you need help or a new code, please call your [facility] clinic or [phone].        Care EveryWhere ID     This is your Care EveryWhere ID. This could be used by other organizations to access your [facility] medical records  [id number]        Equal Access to Services     OK YEAGER : gordon Wilson qaybta kaalmada adeegyada, waxay idiin hayaan adeeg kharash la'aan ah. So [facility] [phone].    ATENCIÓN: Si habla español, tiene a adame " disposición servicios gratuitos de asistencia lingüística. Marija al [phone].    We comply with applicable federal civil rights laws and [place] laws. We do not discriminate on the basis of race, color, national origin, age, disability, sex, sexual orientation, or gender identity.            After Visit Summary       This is your record. Keep this with you and show to your community pharmacist(s) and doctor(s) at your next visit.

## 2017-12-01 NOTE — DISCHARGE INSTRUCTIONS
Pharyngitis: Strep (Confirmed)    You have had a positive test for strep throat. Strep throat is a contagious illness. It is spread by coughing, kissing or by touching others after touching your mouth or nose. Symptoms include throat pain that is worse with swallowing, aching all over, headache, and fever. It is treated with antibiotic medicine. This should help you start to feel better in 1 to 2 days.  Home care    Rest at home. Drink plenty of fluids to you won't get dehydrated.    No work or school for the first 2 days of taking the antibiotics. After this time, you will not be contagious. You can then return to school or work if you are feeling better.     Take antibiotic medicine for the full 10 days, even if you feel better. This is very important to ensure the infection is treated. It is also important to prevent medicine-resistant germs from developing. If you were given an antibiotic shot, you don't need any more antibiotics.    You may use acetaminophen or ibuprofen to control pain or fever, unless another medicine was prescribed for this. Talk with your doctor before taking these medicines if you have chronic liver or kidney disease. Also talk with your doctor if you have had a stomach ulcer or GI bleeding.    Throat lozenges or sprays help reduce pain. Gargling with warm saltwater will also reduce throat pain. Dissolve 1/2 teaspoon of salt in 1 glass of warm water. This may be useful just before meals.     Soft foods are OK. Avoid salty or spicy foods.  Follow-up care  Follow up with your healthcare provider or our staff if you don't get better over the next week.  When to seek medical advice  Call your healthcare provider right away if any of these occur:    Fever of 100.4 F (38 C) or higher, or as directed by your healthcare provider    New or worsening ear pain, sinus pain, or headache    Painful lumps in the back of neck    Stiff neck    Lymph nodes getting larger or becoming soft in the  middle    You can't swallow liquids or you can't open your mouth wide because of throat pain    Signs of dehydration. These include very dark urine or no urine, sunken eyes, and dizziness.    Trouble breathing or noisy breathing    Muffled voice    Rash  Date Last Reviewed: 4/13/2015 2000-2017 [employer]. [address] [place] [address], [place] [address]. All rights reserved. This information is not intended as a substitute for professional medical care. Always follow your healthcare professional's instructions.

## 2017-12-01 NOTE — ED PROVIDER NOTES
History     Chief Complaint   Patient presents with     Cough     pharyngitis     HPI  Marco Stovall is a 52 year old male who presents to urgent care for evaluation of sore throat for 2 days. Accompanied by body aches, fever, and intermittent cough.  Denies chest pain or shortness of breath.    Problem List:    Patient Active Problem List    Diagnosis Date Noted     Mild intermittent asthma without complication 03/24/2016     Priority: Medium     Hyperlipidemia LDL goal <130 07/20/2011     Priority: Medium     , July, 2011.        Chronic maxillary sinusitis 07/15/2011     Priority: Medium     Seen at [facility], and Dr. Yang. Surgery in 2010.        Screening for hypertension 10/16/2007     Priority: Medium        Past Medical History:    Past Medical History:   Diagnosis Date     Mild intermittent asthma without complication 3/24/2016     Polyp of nasal cavity        Past Surgical History:    Past Surgical History:   Procedure Laterality Date     ENT SURGERY       ETHMOIDECTOMY  6/16/2014    Procedure: ETHMOIDECTOMY;  Surgeon: Jimbo Yang MD;  Location: [facility] OR     SURGICAL HISTORY OF -   2010    Nasal Polyp; 3 total surgeries: 1997       Family History:    Family History   Problem Relation Age of Onset     Hypertension Mother      Thyroid Disease Mother      hypothyroidism     Hypertension Father      DIABETES Maternal Grandfather      Thyroid Disease Daughter      hypothyroidism     Asthma No family hx of      C.A.D. No family hx of      CEREBROVASCULAR DISEASE No family hx of      Breast Cancer No family hx of      Cancer - colorectal No family hx of      Prostate Cancer No family hx of        Social History:  Marital Status:   [4]  Social History   Substance Use Topics     Smoking status: Never Smoker     Smokeless tobacco: Never Used     Alcohol use Yes      Comment: wine or mixed 2 a month or more.        Medications:      penicillin V potassium (VEETID) 500  MG tablet   sildenafil (REVATIO) 20 MG tablet   albuterol (PROAIR HFA/PROVENTIL HFA/VENTOLIN HFA) 108 (90 BASE) MCG/ACT Inhaler   predniSONE (DELTASONE) 20 MG tablet   sildenafil (VIAGRA) 100 MG tablet   Multiple Vitamin (MULTIVITAMIN) per tablet         Review of Systems  As mentioned above in the history present illness. All other systems were reviewed and are negative.    Physical Exam   BP: 152/90  Pulse: 73  Temp: 99.4  F (37.4  C)  Resp: 18  Weight: 73.5 kg (162 lb)  SpO2: 97 %      Physical Exam  GENERAL APPEARANCE: healthy, alert and no distress  EYES: EOMI,  PERRL, conjunctiva clear  HENT: ear canals and TM's normal.  Nose normal.  Posterior oropharynx erythema with exudate.  Uvula is midline.  No unilateral peritonsillar swelling.  NECK: supple, nontender, no lymphadenopathy  RESP: lungs clear to auscultation - no rales, rhonchi or wheezes  CV: regular rates and rhythm, normal S1 S2, no murmur noted    ED Course     ED Course     Procedures          Results for orders placed or performed during the hospital encounter of 11/30/17 (from the past 24 hour(s))   Rapid strep group A screen POCT   Result Value Ref Range    Rapid Strep A Screen POS neg    Internal QC OK Yes        Assessments & Plan (with Medical Decision Making)   RST positive.  Patient will be initiated on Penicillin.  Patient and family notified contagious for 24 hours after initiating antibiotics. Recommend replacement of toothbrush at that time.  Follow up with PCP if no improvement in three days.  Worrisome reasons to seek care sooner discussed.      I have reviewed the nursing notes.    I have reviewed the findings, diagnosis, plan and need for follow up with the patient.    Discharge Medication List as of 11/30/2017  6:20 PM      START taking these medications    Details   penicillin V potassium (VEETID) 500 MG tablet Take 1 tablet (500 mg) by mouth 2 times daily for 10 days, Disp-20 tablet, R-0, E-Prescribe             Final diagnoses:    Acute streptococcal pharyngitis       11/30/2017   [facility] EMERGENCY DEPARTMENT     Verna Mcmillan, LARA CNP  11/30/17 [id number]

## 2017-12-02 ENCOUNTER — HOSPITAL ENCOUNTER (EMERGENCY)
Facility: CLINIC | Age: 52
Discharge: HOME | End: 2017-12-02
Attending: EMERGENCY MEDICINE | Admitting: EMERGENCY MEDICINE
Payer: COMMERCIAL

## 2017-12-02 ENCOUNTER — NURSE TRIAGE (OUTPATIENT)
Dept: NURSING | Facility: CLINIC | Age: 52
End: 2017-12-02

## 2017-12-02 VITALS
BODY MASS INDEX: 24.69 KG/M2 | RESPIRATION RATE: 14 BRPM | SYSTOLIC BLOOD PRESSURE: 146 MMHG | WEIGHT: 160 LBS | DIASTOLIC BLOOD PRESSURE: 79 MMHG | OXYGEN SATURATION: 97 % | TEMPERATURE: 99.8 F

## 2017-12-02 DIAGNOSIS — K12.1 STOMATITIS: ICD-10-CM

## 2017-12-02 DIAGNOSIS — J02.9 ACUTE PHARYNGITIS, UNSPECIFIED ETIOLOGY: ICD-10-CM

## 2017-12-02 DIAGNOSIS — N50.89 GENITAL LESION, MALE: ICD-10-CM

## 2017-12-02 PROCEDURE — 99284 EMERGENCY DEPT VISIT MOD MDM: CPT | Mod: Z6 | Performed by: EMERGENCY MEDICINE

## 2017-12-02 PROCEDURE — 87591 N.GONORRHOEAE DNA AMP PROB: CPT | Performed by: EMERGENCY MEDICINE

## 2017-12-02 PROCEDURE — 87529 HSV DNA AMP PROBE: CPT | Performed by: EMERGENCY MEDICINE

## 2017-12-02 PROCEDURE — 99284 EMERGENCY DEPT VISIT MOD MDM: CPT | Performed by: EMERGENCY MEDICINE

## 2017-12-02 PROCEDURE — 96372 THER/PROPH/DIAG INJ SC/IM: CPT | Performed by: EMERGENCY MEDICINE

## 2017-12-02 PROCEDURE — 25000132 ZZH RX MED GY IP 250 OP 250 PS 637: Performed by: EMERGENCY MEDICINE

## 2017-12-02 PROCEDURE — 87491 CHLMYD TRACH DNA AMP PROBE: CPT | Performed by: EMERGENCY MEDICINE

## 2017-12-02 PROCEDURE — 25000125 ZZHC RX 250: Performed by: EMERGENCY MEDICINE

## 2017-12-02 PROCEDURE — 87070 CULTURE OTHR SPECIMN AEROBIC: CPT | Performed by: EMERGENCY MEDICINE

## 2017-12-02 PROCEDURE — 25000128 H RX IP 250 OP 636: Performed by: EMERGENCY MEDICINE

## 2017-12-02 RX ORDER — AZITHROMYCIN 250 MG/1
1000 TABLET, FILM COATED ORAL ONCE
Status: COMPLETED | OUTPATIENT
Start: 2017-12-02 | End: 2017-12-02

## 2017-12-02 RX ORDER — VALACYCLOVIR HYDROCHLORIDE 1 G/1
1000 TABLET, FILM COATED ORAL 2 TIMES DAILY
Qty: 20 TABLET | Refills: 0 | Status: SHIPPED | OUTPATIENT
Start: 2017-12-02 | End: 2018-02-15

## 2017-12-02 RX ORDER — DEXAMETHASONE SODIUM PHOSPHATE 4 MG/ML
10 VIAL (ML) INJECTION ONCE
Status: COMPLETED | OUTPATIENT
Start: 2017-12-02 | End: 2017-12-02

## 2017-12-02 RX ORDER — ONDANSETRON 4 MG/1
4 TABLET, ORALLY DISINTEGRATING ORAL ONCE
Status: COMPLETED | OUTPATIENT
Start: 2017-12-02 | End: 2017-12-02

## 2017-12-02 RX ORDER — HYDROCODONE BITARTRATE AND ACETAMINOPHEN 5; 325 MG/1; MG/1
1-2 TABLET ORAL EVERY 4 HOURS PRN
Qty: 15 TABLET | Refills: 0 | Status: SHIPPED | OUTPATIENT
Start: 2017-12-02 | End: 2018-02-15

## 2017-12-02 RX ADMIN — DEXAMETHASONE SODIUM PHOSPHATE 10 MG: 4 INJECTION, SOLUTION INTRAMUSCULAR; INTRAVENOUS at 19:34

## 2017-12-02 RX ADMIN — ONDANSETRON 4 MG: 4 TABLET, ORALLY DISINTEGRATING ORAL at 19:32

## 2017-12-02 RX ADMIN — CEFTRIAXONE SODIUM 250 MG: 250 INJECTION, POWDER, FOR SOLUTION INTRAMUSCULAR; INTRAVENOUS at 19:49

## 2017-12-02 RX ADMIN — AZITHROMYCIN 1000 MG: 250 TABLET, FILM COATED ORAL at 19:32

## 2017-12-02 NOTE — ED AVS SNAPSHOT
[facility] Emergency Department    [address] [address]    Phone:  [phone]    Fax:  [phone]                                       Marco Stovall   MRN: [id number]    Department:  [facility] Emergency Department   Date of Visit:  12/2/2017           After Visit Summary Signature Page     I have received my discharge instructions, and my questions have been answered. I have discussed any challenges I see with this plan with the nurse or doctor.    ..........................................................................................................................................  Patient/Patient Representative Signature      ..........................................................................................................................................  Patient Representative Print Name and Relationship to Patient    ..................................................               ................................................  Date                                            Time    ..........................................................................................................................................  Reviewed by Signature/Title    ...................................................              ..............................................  Date                                                            Time

## 2017-12-02 NOTE — ED AVS SNAPSHOT
[facility] Emergency Department    [address] [address]    Phone:  [phone]    Fax:  [phone]                                       Marco Stovall   MRN: [id number]    Department:  [facility] Emergency Department   Date of Visit:  12/2/2017           Patient Information     Date Of Birth          1965        Your diagnoses for this visit were:     Acute pharyngitis, unspecified etiology     Stomatitis     Genital lesion, male        You were seen by Juan J Dickinson MD.      Follow-up Information     Schedule an appointment as soon as possible for a visit with Andrew Andrade MD.    Specialty:  Family Practice    Contact information:    [address] [place] [address]  [phone]          Follow up with [facility] Emergency Department.    Specialty:  EMERGENCY MEDICINE    Why:  If symptoms worsen    Contact information:    [address] [place] [id number] [phone]    Additional information:    The medical center is located at   [address] [place] [address] (between [facility] and   [facility] in [place], four miles north   of [place]).        Discharge Instructions         Self-Care for Sore Throats    Sore throats happen for many reasons, such as colds, allergies, and infections caused by viruses or bacteria. In any case, your throat becomes red and sore. Your goal for self-care is to reduce your discomfort while giving your throat a chance to heal.  Moisten and soothe your throat  Tips include the following:    Try a sip of water first thing after waking up.    Keep your throat moist by drinking 6 or more glasses of clear liquids every day.    Run a cool-air humidifier in your room overnight.    Avoid cigarette smoke.     Suck on throat lozenges, cough drops, hard candy, ice chips, or frozen fruit-juice bars. Use the sugar-free versions if your diet or medical condition requires them.  Gargle to ease irritation  Gargling every hour or 2  can ease irritation. Try gargling with 1 of these solutions:    1/4 teaspoon of salt in 1/2 cup of warm water    An over-the-counter anesthetic gargle  Use medicine for more relief  Over-the-counter medicine can reduce sore throat symptoms. Ask your pharmacist if you have questions about which medicine to use:    Ease pain with anesthetic sprays. Aspirin or an aspirin substitute also helps. Remember, never give aspirin to anyone 18 or younger, or if you are already taking blood thinners.     For sore throats caused by allergies, try antihistamines to block the allergic reaction.    Remember: unless a sore throat is caused by a bacterial infection, antibiotics won t help you.  Prevent future sore throats  Prevention tips include the following:    Stop smoking or reduce contact with secondhand smoke. Smoke irritates the tender throat lining.    Limit contact with pets and with allergy-causing substances, such as pollen and mold.    When you re around someone with a sore throat or cold, wash your hands often to keep viruses or bacteria from spreading.    Don t strain your vocal cords.  Call your healthcare provider  Contact your healthcare provider if you have:    A temperature over 101 F (38.3 C)    White spots on the throat    Great difficulty swallowing    Trouble breathing    A skin rash    Recent exposure to someone else with strep bacteria    Severe hoarseness and swollen glands in the neck or jaw   Date Last Reviewed: 8/1/2016 2000-2017 [employer] [address] [place] [address]. All rights reserved. This information is not intended as a substitute for professional medical care. Always follow your healthcare professional's instructions.          Discharge References/Attachments     APHTHOUS ULCER (ENGLISH)    COLD SORES, UNDERSTANDING (ENGLISH)    CARING FOR SORES, HERPES: (ENGLISH)    HERPES, DIAGNOSING (ENGLISH)    HERPES VIRUS, THE (ENGLISH)      24 Hour Appointment Hotline        To make an appointment at any [facility] clinic, call [phone] ([phone]). If you don't have a family doctor or clinic, we will help you find one. [facility] clinics are conveniently located to serve the needs of you and your family.             Review of your medicines      START taking        Dose / Directions Last dose taken    HYDROcodone-acetaminophen 5-325 MG per tablet   Commonly known as:  NORCO   Dose:  1-2 tablet   Quantity:  15 tablet        Take 1-2 tablets by mouth every 4 hours as needed for moderate to severe pain   Refills:  0        valACYclovir 1000 mg tablet   Commonly known as:  VALTREX   Dose:  1000 mg   Quantity:  20 tablet        Take 1 tablet (1,000 mg) by mouth 2 times daily   Refills:  0          Our records show that you are taking the medicines listed below. If these are incorrect, please call your family doctor or clinic.        Dose / Directions Last dose taken    albuterol 108 (90 BASE) MCG/ACT Inhaler   Commonly known as:  PROAIR HFA/PROVENTIL HFA/VENTOLIN HFA   Dose:  2 puff   Quantity:  1 Inhaler        Inhale 2 puffs into the lungs every 4 hours as needed for shortness of breath / dyspnea   Refills:  11        multivitamin per tablet   Dose:  1 tablet   Quantity:  100 tablet        Take 1 tablet by mouth daily.   Refills:  12        penicillin V potassium 500 MG tablet   Commonly known as:  VEETID   Dose:  500 mg   Quantity:  20 tablet        Take 1 tablet (500 mg) by mouth 2 times daily for 10 days   Refills:  0        predniSONE 20 MG tablet   Commonly known as:  DELTASONE   Quantity:  20 tablet        Take 3 tabs (60 mg) by mouth daily x 3 days, 2 tabs (40 mg) daily x 3 days, 1 tab (20 mg) daily x 3 days, then 1/2 tab (10 mg) x 3 days.   Refills:  0        sildenafil 100 MG tablet   Commonly known as:  VIAGRA   Dose:   mg   Quantity:  6 tablet        Take 0.5-1 tablets ( mg) by mouth daily as needed for erectile dysfunction Take 30 min to 4 hours before  intercourse.   Refills:  11        sildenafil 20 MG tablet   Commonly known as:  REVATIO   Quantity:  30 tablet        Take 1 tablet (20 mg) by mouth three times daily for pulmonary hypertension.  Never use with nitroglycerin, terazosin or doxazosin.   Refills:  11                Prescriptions were sent or printed at these locations (2 Prescriptions)                   Other Prescriptions                Printed at Department/Unit printer (2 of 2)         HYDROcodone-acetaminophen (NORCO) 5-325 MG per tablet               valACYclovir (VALTREX) 1000 mg tablet                Procedures and tests performed during your visit     Chlamydia trachomatis PCR    HSV 1 and 2 DNA by PCR    Neisseria gonorrhoea PCR    Throat Culture Aerobic Bacterial      Orders Needing Specimen Collection     None      Pending Results     No orders found from 11/30/2017 to 12/3/2017.            Pending Culture Results     No orders found from 11/30/2017 to 12/3/2017.            Pending Results Instructions     If you had any lab results that were not finalized at the time of your Discharge, you can call the ED Lab Result RN at [phone]. You will be contacted by this team for any positive Lab results or changes in treatment. The nurses are available 7 days a week from 10A to 6:30P.  You can leave a message 24 hours per day and they will return your call.        Test Results From Your Hospital Stay               Thank you for choosing [facility]       Thank you for choosing [facility] for your care. Our goal is always to provide you with excellent care. Hearing back from our patients is one way we can continue to improve our services. Please take a few minutes to complete the written survey that you may receive in the mail after you visit with us. Thank you!        [employer] Information     [employer] lets you send messages to your doctor, view your test results, renew your prescriptions, schedule appointments and more. To sign up, go to  [email or web address] . Click on \"Log in\" on the left side of the screen, which will take you to the Welcome page. Then click on \"Sign up Now\" on the right side of the page.     You will be asked to enter the access code listed below, as well as some personal information. Please follow the directions to create your username and password.     Your access code is: [email or web address]  Expires: 2018  6:22 AM     Your access code will  in 90 days. If you need help or a new code, please call your [facility] clinic or [phone].        Care EveryWhere ID     This is your Care EveryWhere ID. This could be used by other organizations to access your [facility] medical records  [id number]        Equal Access to Services     OK YEAGER : Joyce Luna, gordon adams, benjamin chingaljim barragan, radha chauhan. So [facility] [phone].    ATENCIÓN: Si habla español, tiene a adame disposición servicios gratuitos de asistencia lingüística. Marija al [phone].    We comply with applicable federal civil rights laws and [place] laws. We do not discriminate on the basis of race, color, national origin, age, disability, sex, sexual orientation, or gender identity.            After Visit Summary       This is your record. Keep this with you and show to your community pharmacist(s) and doctor(s) at your next visit.                  "

## 2017-12-02 NOTE — TELEPHONE ENCOUNTER
Dx  with flu and strep throat on 11/30/17 and given PCN .  Sore throat is better but can t break the fever .  Poor appetite .  Currently : sweating , T 101.7 orally , voided last within hour and has saliva  &  pushing fluids .    .Sasha Granger RN [facility] nurse advisors.    Additional Information    Negative: Severe difficulty breathing (e.g., struggling for each breath, speaks in single words, stridor)    Negative: Sounds like a life-threatening emergency to the triager    Negative: [1] Drooling or spitting out saliva (because can't swallow) AND [2] new onset    Negative: Unable to open mouth completely    Negative: Difficulty breathing (per caller) but not severe    Negative: [1] Fever > 103 F (39.4 C) AND [2] took antibiotic > 24 hours    Negative: [1] Drinking very little AND [2] dehydration suspected (e.g., no urine > 12 hours, very dry mouth, very lightheaded)    Negative: [1] Refuses to drink anything AND [2] for > 12 hours    Negative: Patient sounds very sick or weak to the triager    Negative: [1] Taking antibiotic > 24 hours for strep throat AND [2] sore throat pain is SEVERE    Negative: [1] Taking antibiotic > 48 hours (2 days) for strep throat AND [2] fever persists    Negative: [1] Taking antibiotic > 72 hours (3 days) for strep throat AND [2] sore throat not improved    Negative: [1] Taking antibiotic < 72 hours (3 days) for strep throat AND [2] sore throat not improved    [1] Taking antibiotics < 48 hours for strep throat AND [2] fever persists    Protocols used: STREP THROAT INFECTION ON ANTIBIOTIC FOLLOW-UP CALL-ADULT-

## 2017-12-03 ENCOUNTER — TELEPHONE (OUTPATIENT)
Dept: EMERGENCY MEDICINE | Facility: CLINIC | Age: 52
End: 2017-12-03

## 2017-12-03 LAB
C TRACH DNA SPEC QL NAA+PROBE: NEGATIVE
HSV1 DNA SPEC QL NAA+PROBE: POSITIVE
HSV2 DNA SPEC QL NAA+PROBE: NEGATIVE
N GONORRHOEA DNA SPEC QL NAA+PROBE: NEGATIVE
SPECIMEN SOURCE: ABNORMAL
SPECIMEN SOURCE: NORMAL
SPECIMEN SOURCE: NORMAL

## 2017-12-03 NOTE — TELEPHONE ENCOUNTER
[facility] Emergency Department Lab result notification:    [place] ED lab result protocol used  Herpes Simplex Protocol    Reason for call  Notify of lab results, assess symptoms,  review ED providers recommendations/discharge instructions (if necessary) and advise per ED lab result f/u protocol:  No call made yet waiting G/C, will call when completed.     Lab Result  Final result for HSV 1 is POSITIVE and HSV 2 DNA by PCR is NEGATIVE.    [place] ED discharge antiviral medication (if prescribed): Valacyclovir (VALTREX) 1000 mg tablet, Take 1 tablet (1,000 mg) by mouth 2 times daily for 10 days  If treated in the [place] ED, Notify patient of result.  Information table from ED Provider visit on 12/02/2017  Symptoms reported at ED visit (Chief complaint, HPI) a 52 year old male who presents for evaluation of sore throat, oropharyngeal sores and genital sores. Rapid strep test was performed 2 days ago and was positive.  He was prescribed penicillin and has taken this for 5 doses.  Genital sores began approximately one week ago.  He's also had mild dysuria at the end of micturition.  No penile discharge.  He has mild cough and has felt feverish and has had generalized weakness and malaise.  New sexual partner approximately 2 weeks ago with unprotected intercourse and oral sex.  His partner has no known or confirmed STD.  No other pertinent history or acute complaints or concerns.   Review previous records reveal that he had extensive STD testing approximately one month ago which was negative.   ED providers Impression and Plan (applicable information) Patient with recently diagnosed strep pharyngitis, on penicillin for 2.5 days, with presentation for evaluation of oropharyngeal source/ulcers and genital ulcerative lesions which been present past week.  New sexual partner with unprotected sex in the past 2 weeks.  Urine was sent for GC and chlamydia PCR.  Penile lesion was sent for HSV 1 and 2 PCR evaluation.   Throat culture (aerobic) was sent.  Rapid strep test was not repeated.  Will initiate Valacyclovir x 10 days for presumed herpes genitalis and is he was given   Ceftriaxone 250 mg IM and Azithromycin 1 g po for coverage for GC and chlamydia.  Will have him continue Penicillin for strep pharyngitis. Prior to discharge he was given Decadron 10 mg po.  He was prescribed Norco to use for pain refractory to Ibuprofen.  I recommended recheck in primary care clinic next week for re-evaluation and check on lab results. He had extensive STD testing ~ one month ago which was negative. Patient was provided instructions for supportive care and will return as needed for worsened condition or worsening symptoms, or new problems or concerns   Miscellaneous information Schedule an appointment as soon as possible for a visit with Andrew Andrade MD Kristie Ludden, RN  [facility] RN  Lung Nodule and ED Lab Result F/u RN  Epic pool (ED late result f/u RN): [id number]  [facility] INCIDENTAL RADIOLOGY F/U NURSES: P [id number]  # [phone]      Copy of Lab result   Exam Information   Exam Date Exam Time Accession # Results    12/2/17  7:10 PM     Component Results   Component Value Flag Ref Range Units Status Collected Lab   HSV Specimen Type Penis    Final 12/02/2017  7:10 PM 59   Comment:   Genital   HSV Type 1 PCR Positive (A) NEG^Negative  Final 12/02/2017  7:10 PM 75   HSV Type 2 PCR Negative  NEG^Negative  Final 12/02/2017  7:10 PM 75   Comment:     The qualitative Herpes simplex virus DNA assay is a real-time polymerase   chain reaction (PCR) utilizing analyte specific reagents manufactured by [employer].  Analyte Specific Reagents (ASRs) are used in many laboratory   tests necessary for standard medical care and generally do not require FDA   approval.     This test was developed and its performance characteristics determined by   the [facility]  Laboratories.  It has not been   cleared or approved by the US Food and Drug Administration.

## 2017-12-03 NOTE — DISCHARGE INSTRUCTIONS
Self-Care for Sore Throats    Sore throats happen for many reasons, such as colds, allergies, and infections caused by viruses or bacteria. In any case, your throat becomes red and sore. Your goal for self-care is to reduce your discomfort while giving your throat a chance to heal.  Moisten and soothe your throat  Tips include the following:    Try a sip of water first thing after waking up.    Keep your throat moist by drinking 6 or more glasses of clear liquids every day.    Run a cool-air humidifier in your room overnight.    Avoid cigarette smoke.     Suck on throat lozenges, cough drops, hard candy, ice chips, or frozen fruit-juice bars. Use the sugar-free versions if your diet or medical condition requires them.  Gargle to ease irritation  Gargling every hour or 2 can ease irritation. Try gargling with 1 of these solutions:    1/4 teaspoon of salt in 1/2 cup of warm water    An over-the-counter anesthetic gargle  Use medicine for more relief  Over-the-counter medicine can reduce sore throat symptoms. Ask your pharmacist if you have questions about which medicine to use:    Ease pain with anesthetic sprays. Aspirin or an aspirin substitute also helps. Remember, never give aspirin to anyone 18 or younger, or if you are already taking blood thinners.     For sore throats caused by allergies, try antihistamines to block the allergic reaction.    Remember: unless a sore throat is caused by a bacterial infection, antibiotics won t help you.  Prevent future sore throats  Prevention tips include the following:    Stop smoking or reduce contact with secondhand smoke. Smoke irritates the tender throat lining.    Limit contact with pets and with allergy-causing substances, such as pollen and mold.    When you re around someone with a sore throat or cold, wash your hands often to keep viruses or bacteria from spreading.    Don t strain your vocal cords.  Call your healthcare provider  Contact your healthcare provider if  you have:    A temperature over 101 F (38.3 C)    White spots on the throat    Great difficulty swallowing    Trouble breathing    A skin rash    Recent exposure to someone else with strep bacteria    Severe hoarseness and swollen glands in the neck or jaw   Date Last Reviewed: 8/1/2016 2000-2017 [employer]. [address] [place], [place] [id number]. All rights reserved. This information is not intended as a substitute for professional medical care. Always follow your healthcare professional's instructions.

## 2017-12-03 NOTE — ED NOTES
Pt medicated per MD Orders, tolerating PO: water, crackers, applesauce.   Pt ready for DC to home.

## 2017-12-03 NOTE — TELEPHONE ENCOUNTER
Pt notified of all testing results, pt states symptom are better.    No further questions.    Follow up as needed.    Antonia Pemberton, RN  [facility] [employer] [facility] RN  Lung Nodule and ED Lab Result F/u RN  Epic pool (ED late result f/u RN): P [id number]  [facility] INCIDENTAL RADIOLOGY F/U NURSES: P [id number]  # [phone]

## 2017-12-03 NOTE — ED PROVIDER NOTES
History     Chief Complaint   Patient presents with     Exposure to STD     pt diagnosed with strep on Thursday night. pt taking penicillin.   pt continues to run fever with tylenol every 4 hours.     HPI  Marco Stovall is a 52 year old male who presents for evaluation of sore throat, oropharyngeal sores and genital sores. Rapid strep test was performed 2 days ago and was positive.  He was prescribed penicillin and has taken this for 5 doses.  Genital sores began approximately one week ago.  He's also had mild dysuria at the end of micturition.  No penile discharge.  He has mild cough and has felt feverish and has had generalized weakness and malaise.  New sexual partner approximately 2 weeks ago with unprotected intercourse and oral sex.  His partner has no known or confirmed STD.  No rash, fever or chills.  No abdominal pain, no back or flank pain. No other pertinent history or acute complaints or concerns.   Review previous records reveal that he had extensive STD testing approximately one month ago which was negative.    Problem List:    Patient Active Problem List    Diagnosis Date Noted     Mild intermittent asthma without complication 03/24/2016     Priority: Medium     Hyperlipidemia LDL goal <130 07/20/2011     Priority: Medium     , July, 2011.        Chronic maxillary sinusitis 07/15/2011     Priority: Medium     Seen at [facility], and Dr. Yang. Surgery in 2010.        Screening for hypertension 10/16/2007     Priority: Medium        Past Medical History:    Past Medical History:   Diagnosis Date     Mild intermittent asthma without complication 3/24/2016     Polyp of nasal cavity        Past Surgical History:    Past Surgical History:   Procedure Laterality Date     ENT SURGERY       ETHMOIDECTOMY  6/16/2014    Procedure: ETHMOIDECTOMY;  Surgeon: Jimbo Yang MD;  Location: [facility] OR     SURGICAL HISTORY OF -   2010    Nasal Polyp; 3 total surgeries: 1997        Family History:    Family History   Problem Relation Age of Onset     Hypertension Mother      Thyroid Disease Mother      hypothyroidism     Hypertension Father      DIABETES Maternal Grandfather      Thyroid Disease Daughter      hypothyroidism     Asthma No family hx of      C.A.D. No family hx of      CEREBROVASCULAR DISEASE No family hx of      Breast Cancer No family hx of      Cancer - colorectal No family hx of      Prostate Cancer No family hx of        Social History:  Marital Status:   [4]  Social History   Substance Use Topics     Smoking status: Never Smoker     Smokeless tobacco: Never Used     Alcohol use Yes      Comment: wine or mixed 2 a month or more.        Medications:      HYDROcodone-acetaminophen (NORCO) 5-325 MG per tablet   valACYclovir (VALTREX) 1000 mg tablet   penicillin V potassium (VEETID) 500 MG tablet   sildenafil (REVATIO) 20 MG tablet   albuterol (PROAIR HFA/PROVENTIL HFA/VENTOLIN HFA) 108 (90 BASE) MCG/ACT Inhaler   predniSONE (DELTASONE) 20 MG tablet   sildenafil (VIAGRA) 100 MG tablet   Multiple Vitamin (MULTIVITAMIN) per tablet         Review of Systems  As mentioned above in the history present illness.  All other systems were reviewed and are negative.    Physical Exam   BP: 158/84  Heart Rate: 79  Temp: 99.8  F (37.7  C)  Resp: 18  Weight: 72.6 kg (160 lb)  SpO2: 96 %      Physical Exam   Constitutional: He is oriented to person, place, and time. He appears well-developed and well-nourished. No distress.   HENT:   Head: Normocephalic and atraumatic.   Posterior pharyngeal injection and oral aphthous ulcers.  No trismus or peritonsillar abscess.  Uvula midline.    Eyes: Conjunctivae and EOM are normal. No scleral icterus.   Neck: Normal range of motion. Neck supple. No tracheal deviation present. No thyromegaly present.   Cardiovascular: Normal rate, regular rhythm and normal heart sounds.  Exam reveals no gallop and no friction rub.    No murmur  heard.  Pulmonary/Chest: Effort normal and breath sounds normal. No stridor. No respiratory distress. He has no wheezes. He has no rales.   Abdominal: Soft. There is no tenderness.   Genitourinary:   Genitourinary Comments: Several shallow ulcerative lesions at the base of the penis and surrounding anterior pubic area.  No weeping, drainage or cellulitis.   Musculoskeletal: Normal range of motion. He exhibits no edema.   Lymphadenopathy:     He has cervical adenopathy (small, anterior, benign).   Neurological: He is alert and oriented to person, place, and time.   Skin: Skin is warm and dry. No rash noted. He is not diaphoretic. No erythema. No pallor.   Psychiatric: He has a normal mood and affect. His behavior is normal.   Nursing note and vitals reviewed.      ED Course     ED Course     Procedures          Results for orders placed or performed during the hospital encounter of 12/02/17   Chlamydia trachomatis PCR   Result Value Ref Range    Specimen Description Urine     Chlamydia Trachomatis PCR Negative NEG^Negative   Neisseria gonorrhoea PCR   Result Value Ref Range    Specimen Descrip Urine     N Gonorrhea PCR Negative NEG^Negative   Throat Culture Aerobic Bacterial   Result Value Ref Range    Specimen Description Throat     Special Requests Specimen collected in eSwab transport (white cap)     Culture Micro Heavy growth  Normal magy      HSV 1 and 2 DNA by PCR   Result Value Ref Range    HSV Specimen Type Penis     HSV Type 1 PCR Positive (A) NEG^Negative    HSV Type 2 PCR Negative NEG^Negative              Medications   cefTRIAXone (ROCEPHIN) 250 mg in lidocaine (PF) (XYLOCAINE) 1 % injection (250 mg Intramuscular Given 12/2/17 1949)   azithromycin (ZITHROMAX) tablet 1,000 mg (1,000 mg Oral Given 12/2/17 1932)   dexamethasone (DECADRON) oral solution (inj used orally) 10 mg (10 mg Oral Given 12/2/17 1934)   ondansetron (ZOFRAN-ODT) ODT tab 4 mg (4 mg Oral Given 12/2/17 1932)       Assessments & Plan (with  Medical Decision Making)   Patient with recently diagnosed strep pharyngitis, on penicillin for 2.5 days, with presentation for evaluation of oropharyngeal source/ulcers and genital ulcerative lesions which been present past week.  New sexual partner with unprotected sex in the past 2 weeks.  Urine was sent for GC and chlamydia PCR.  Penile lesion was sent for HSV 1 and 2 PCR evaluation.  Throat culture (aerobic) was sent.  Rapid strep test was not repeated.  Will initiate Valacyclovir x 10 days for presumed herpes genitalis and is he was given   Ceftriaxone 250 mg IM and Azithromycin 1 g po for coverage for GC and chlamydia.  Will have him continue Penicillin for strep pharyngitis. Prior to discharge he was given Decadron 10 mg po.  He was prescribed Norco to use for pain refractory to Ibuprofen.  I recommended recheck in primary care clinic next week for re-evaluation and check on lab results. He had extensive STD testing ~ one month ago which was negative. Patient was provided instructions for supportive care and will return as needed for worsened condition or worsening symptoms, or new problems or concerns.    I have reviewed the nursing notes.    I have reviewed the findings, diagnosis, plan and need for follow up with the patient.    New Prescriptions    HYDROCODONE-ACETAMINOPHEN (NORCO) 5-325 MG PER TABLET    Take 1-2 tablets by mouth every 4 hours as needed for moderate to severe pain    VALACYCLOVIR (VALTREX) 1000 MG TABLET    Take 1 tablet (1,000 mg) by mouth 2 times daily       Final diagnoses:   Acute pharyngitis, unspecified etiology   Stomatitis   Genital lesion, male       12/2/2017   [facility] EMERGENCY DEPARTMENT     Juan J Dickinson MD  12/06/17 0469

## 2017-12-03 NOTE — ED NOTES
"   Pt arrived via triage, ambulatory, on his own.   Pt recently dx with Strep throat and is on antibiotic, but states he is really here is \" I have a new girlfriend, and now I have sores in my mouth, and down there\".         Throat, very red, swollen with sores not on tongue, side of cheeks, and back of throat.         Groin:  Lesions noted around the penis, pt denies painful urination.  States sores are painful and do have puss or weep he 'thinks'.   Pt reports sore appeared 'a couple days I guess'.   Pt unable to give urine sample at this time.       PLAN:  Will await MD assessment and orders.  "

## 2017-12-04 LAB
BACTERIA SPEC CULT: NORMAL
Lab: NORMAL
SPECIMEN SOURCE: NORMAL

## 2018-02-15 ENCOUNTER — HOSPITAL ENCOUNTER (EMERGENCY)
Facility: CLINIC | Age: 53
Discharge: HOME | End: 2018-02-15
Attending: PHYSICIAN ASSISTANT | Admitting: PHYSICIAN ASSISTANT
Payer: COMMERCIAL

## 2018-02-15 VITALS — OXYGEN SATURATION: 97 % | TEMPERATURE: 97.9 F | SYSTOLIC BLOOD PRESSURE: 126 MMHG | DIASTOLIC BLOOD PRESSURE: 75 MMHG

## 2018-02-15 DIAGNOSIS — R07.0 THROAT PAIN: ICD-10-CM

## 2018-02-15 LAB
INTERNAL QC OK POCT: YES
S PYO AG THROAT QL IA.RAPID: NEGATIVE

## 2018-02-15 PROCEDURE — 99213 OFFICE O/P EST LOW 20 MIN: CPT | Performed by: PHYSICIAN ASSISTANT

## 2018-02-15 PROCEDURE — 87880 STREP A ASSAY W/OPTIC: CPT | Performed by: PHYSICIAN ASSISTANT

## 2018-02-15 PROCEDURE — 87081 CULTURE SCREEN ONLY: CPT | Performed by: PHYSICIAN ASSISTANT

## 2018-02-15 PROCEDURE — G0463 HOSPITAL OUTPT CLINIC VISIT: HCPCS

## 2018-02-15 NOTE — ED AVS SNAPSHOT
[facility] Emergency Department    [address] [address]    Phone:  [phone]    Fax:  [phone]                                       Marco Stovall   MRN: [id number]    Department:  [facility] Emergency Department   Date of Visit:  2/15/2018           After Visit Summary Signature Page     I have received my discharge instructions, and my questions have been answered. I have discussed any challenges I see with this plan with the nurse or doctor.    ..........................................................................................................................................  Patient/Patient Representative Signature      ..........................................................................................................................................  Patient Representative Print Name and Relationship to Patient    ..................................................               ................................................  Date                                            Time    ..........................................................................................................................................  Reviewed by Signature/Title    ...................................................              ..............................................  Date                                                            Time

## 2018-02-15 NOTE — ED AVS SNAPSHOT
[facility] Emergency Department    [address] [address]    Phone:  [phone]    Fax:  [phone]                                       Marco Stovall   MRN: [id number]    Department:  [facility] Emergency Department   Date of Visit:  2/15/2018           Patient Information     Date Of Birth          1965        Your diagnoses for this visit were:     Throat pain        You were seen by Altagracia Gunn PA-C.      Follow-up Information     Follow up with Chelsea Ji MD.    Specialty:  Otolaryngology    Why:  follow up     Contact information:    [address]  [place] [address]  [phone]        24 Hour Appointment Hotline       To make an appointment at any [facility], call [phone] ([phone]). If you don't have a family doctor or clinic, we will help you find one. [facility] are conveniently located to serve the needs of you and your family.          ED Discharge Orders     OTOLARYNGOLOGY REFERRAL       Your provider has referred you to: [facility]: [facility] [phone]   [email or web address]    Please be aware that coverage of these services is subject to the terms and limitations of your health insurance plan.  Call member services at your health plan with any benefit or coverage questions.      Please bring the following with you to your appointment:    (1) Any X-Rays, CTs or MRIs which have been performed.  Contact the facility where they were done to arrange for  prior to your scheduled appointment.   (2) List of current medications  (3) This referral request   (4) Any documents/labs given to you for this referral                     Review of your medicines      Our records show that you are taking the medicines listed below. If these are incorrect, please call your family doctor or clinic.        Dose / Directions Last dose taken    albuterol 108 (90 BASE) MCG/ACT Inhaler    Commonly known as:  PROAIR HFA/PROVENTIL HFA/VENTOLIN HFA   Dose:  2 puff   Quantity:  1 Inhaler        Inhale 2 puffs into the lungs every 4 hours as needed for shortness of breath / dyspnea   Refills:  11        multivitamin per tablet   Dose:  1 tablet   Quantity:  100 tablet        Take 1 tablet by mouth daily.   Refills:  12        sildenafil 100 MG tablet   Commonly known as:  VIAGRA   Dose:   mg   Quantity:  6 tablet        Take 0.5-1 tablets ( mg) by mouth daily as needed for erectile dysfunction Take 30 min to 4 hours before intercourse.   Refills:  11        sildenafil 20 MG tablet   Commonly known as:  REVATIO   Quantity:  30 tablet        Take 1 tablet (20 mg) by mouth three times daily for pulmonary hypertension.  Never use with nitroglycerin, terazosin or doxazosin.   Refills:  11                Procedures and tests performed during your visit     Beta strep group A r/o culture    Rapid strep group A screen POCT      Orders Needing Specimen Collection     None      Pending Results     No orders found from 2/13/2018 to 2/16/2018.            Pending Culture Results     No orders found from 2/13/2018 to 2/16/2018.            Pending Results Instructions     If you had any lab results that were not finalized at the time of your Discharge, you can call the ED Lab Result RN at [phone]. You will be contacted by this team for any positive Lab results or changes in treatment. The nurses are available 7 days a week from 10A to 6:30P.  You can leave a message 24 hours per day and they will return your call.        Test Results From Your Hospital Stay        2/15/2018  6:39 PM      Component Results     Component Value Ref Range & Units Status    Rapid Strep A Screen NEGATIVE neg Final    Internal QC OK Yes  Final                Thank you for choosing [facility]       Thank you for choosing [facility] for your care. Our goal is always to provide you with excellent care. Hearing back from our  "patients is one way we can continue to improve our services. Please take a few minutes to complete the written survey that you may receive in the mail after you visit with us. Thank you!        [email or web address] Information     [employer] lets you send messages to your doctor, view your test results, renew your prescriptions, schedule appointments and more. To sign up, go to [email or web address] . Click on \"Log in\" on the left side of the screen, which will take you to the Welcome page. Then click on \"Sign up Now\" on the right side of the page.     You will be asked to enter the access code listed below, as well as some personal information. Please follow the directions to create your username and password.     Your access code is: [email or web address]  Expires: 2018  6:56 PM     Your access code will  in 90 days. If you need help or a new code, please call your [facility] clinic or [phone].        Care EveryWhere ID     This is your Care EveryWhere ID. This could be used by other organizations to access your [facility] medical records  [id number]        Equal Access to Services     OK YEAGER : Hadvladimir Luna, gordon adams, benjamin barragan, radha talbert . [facility] [phone].    ATENCIÓN: Si habla español, tiene a adame disposición servicios gratuitos de asistencia lingüística. Marija al [phone].    We comply with applicable federal civil rights laws and [place] laws. We do not discriminate on the basis of race, color, national origin, age, disability, sex, sexual orientation, or gender identity.            After Visit Summary       This is your record. Keep this with you and show to your community pharmacist(s) and doctor(s) at your next visit.                  "

## 2018-02-16 NOTE — ED PROVIDER NOTES
"  History     Chief Complaint   Patient presents with     Pharyngitis     HPI  Marco Stovall is a 52 year old male who presents to the urgent care with concern over sore throat which is been present for the last 3-4 months.  Patient states that in November 2017 he was diagnosed with strep throat and herpes simplex 2 infection which had numerous sores within the mouth.  He was treated with antibiotics and antivirals however he states that his throat pain has never resolved.  He does admit that he has \"bad allergies\" with nasal congestion, intermittent cough, nausea.  He denies any recent fevers, chills, myalgias, dyspnea, wheezing, vomiting, abdominal pain, heartburn.  He has not attempted any OTC treatment consistently for his throat pain.      Problem List:    Patient Active Problem List    Diagnosis Date Noted     Mild intermittent asthma without complication 03/24/2016     Priority: Medium     Hyperlipidemia LDL goal <130 07/20/2011     Priority: Medium     , July, 2011.        Chronic maxillary sinusitis 07/15/2011     Priority: Medium     Seen at [facility], and Dr. Yang. Surgery in 2010.        Screening for hypertension 10/16/2007     Priority: Medium      Past Medical History:    Past Medical History:   Diagnosis Date     Mild intermittent asthma without complication 3/24/2016     Polyp of nasal cavity      Past Surgical History:    Past Surgical History:   Procedure Laterality Date     ENT SURGERY       ETHMOIDECTOMY  6/16/2014    Procedure: ETHMOIDECTOMY;  Surgeon: Jimbo Yang MD;  Location: [facility] OR     SURGICAL HISTORY OF -   2010    Nasal Polyp; 3 total surgeries: 1997     Family History:    Family History   Problem Relation Age of Onset     Hypertension Mother      Thyroid Disease Mother      hypothyroidism     Hypertension Father      DIABETES Maternal Grandfather      Thyroid Disease Daughter      hypothyroidism     Asthma No family hx of      C.A.D. No family hx " of      CEREBROVASCULAR DISEASE No family hx of      Breast Cancer No family hx of      Cancer - colorectal No family hx of      Prostate Cancer No family hx of      Social History:  Marital Status:   [4]  Social History   Substance Use Topics     Smoking status: Never Smoker     Smokeless tobacco: Never Used     Alcohol use Yes      Comment: wine or mixed 2 a month or more.      Medications:      sildenafil (REVATIO) 20 MG tablet   albuterol (PROAIR HFA/PROVENTIL HFA/VENTOLIN HFA) 108 (90 BASE) MCG/ACT Inhaler   sildenafil (VIAGRA) 100 MG tablet   Multiple Vitamin (MULTIVITAMIN) per tablet     Review of Systems  CONSTITUTIONAL:NEGATIVE for fever, chills, change in weight  INTEGUMENTARY/SKIN: NEGATIVE for worrisome rashes, moles or lesions  EYES: NEGATIVE for vision changes or irritation  ENT/MOUTH: POSITIVE for sore throat, nasal congestion and NEGATIVE for ear pain   RESP:POSITIVE for cough and NEGATIVE for SOB/dyspnea and wheezing  GI: POSITIVE for intermittent nausea and NEGATIVE for vomiting, diarrhea or abdominal pain  Physical Exam   BP: 126/75  Heart Rate: 61  Temp: 97.9  F (36.6  C)  SpO2: 97 %  Physical Exam  GENERAL APPEARANCE: healthy, alert and no distress  EYES: EOMI,  PERRL, conjunctiva clear  HENT: ear canals and TM's normal.  Posterior pharynx is erythematous, no exudate   NECK: supple, nontender, no lymphadenopathy  RESP: lungs clear to auscultation - no rales, rhonchi or wheezes  CV: regular rates and rhythm, normal S1 S2, no murmur noted  SKIN: no suspicious lesions or rashes  ED Course     ED Course     Procedures        Critical Care time:  none            Labs Ordered and Resulted from Time of ED Arrival Up to the Time of Departure from the ED   RAPID STREP GROUP A SCREEN POCT   BETA STREP GROUP A CULTURE     Assessments & Plan (with Medical Decision Making)     I have reviewed the nursing notes.    I have reviewed the findings, diagnosis, plan and need for follow up with the  patient.       Discharge Medication List as of 2/15/2018  6:56 PM        Final diagnoses:   Throat pain     52-year-old male presents to the urgent care with concerns over 3-4 month history of throat pain that has persisted after initially being diagnosed with strep throat and oral herpes infection.  He had stable vital signs upon arrival.  Physical exam findings as described above.  As part of evaluation patient did have negative rapid strep test with culture pending.   He did not have any evidence of peritonsillar retropharyngeal abscess. I do not suspect mono.  Given duration of symptoms low concern for acute infectious process.  I did discuss possibility of postnasal drainage, esophageal reflux contributing to patient's symptoms however he denied that these could be contributing.  I recommended follow up with ENT given duration of symptoms, referral placed and did offer magic mouthwash for symptomatic relief which patient declined.  He was instructed to follow up with ENT for recheck.  Worrisome reasons to return to ER/UC sooner discussed.    Disclaimer: This note consists of symbols derived from keyboarding, dictation, and/or voice recognition software. As a result, there may be errors in the script that have gone undetected.  Please consider this when interpreting information found in the chart.    2/15/2018   [facility] EMERGENCY DEPARTMENT     Altagracia Gunn PA-C  02/16/18 6943

## 2018-02-17 LAB
BACTERIA SPEC CULT: NORMAL
SPECIMEN SOURCE: NORMAL

## 2018-03-20 ENCOUNTER — TELEPHONE (OUTPATIENT)
Dept: FAMILY MEDICINE | Facility: CLINIC | Age: 53
End: 2018-03-20

## 2018-03-20 NOTE — TELEPHONE ENCOUNTER
Patient is due for colon cancer screening.  Colonoscopy ordered 11/2/17 office visit.  Remind patient to complete, verify if he still has prep information.    Left message for patient to return call.

## 2018-03-27 NOTE — TELEPHONE ENCOUNTER
Panel Management Review      Patient has the following on his problem list:       Composite cancer screening  Chart review shows that this patient is due/due soon for the following Colonoscopy  Summary:    Patient is due/failing the following:   COLONOSCOPY    Action needed:   colonoscopy    Type of outreach:    Phone, spoke to patient.  Reminded to complete the colonoscopy, he is not ready to schedule yet, will call if he needs prep information mailed to him.    Questions for provider review:    None                                                                                                                                    AYSHA Garnett MA

## 2018-08-22 ENCOUNTER — OFFICE VISIT (OUTPATIENT)
Dept: FAMILY MEDICINE | Facility: CLINIC | Age: 53
End: 2018-08-22
Payer: COMMERCIAL

## 2018-08-22 ENCOUNTER — TELEPHONE (OUTPATIENT)
Dept: FAMILY MEDICINE | Facility: CLINIC | Age: 53
End: 2018-08-22

## 2018-08-22 VITALS
TEMPERATURE: 96.6 F | OXYGEN SATURATION: 98 % | BODY MASS INDEX: 26.83 KG/M2 | HEIGHT: 68 IN | DIASTOLIC BLOOD PRESSURE: 82 MMHG | WEIGHT: 177 LBS | HEART RATE: 53 BPM | SYSTOLIC BLOOD PRESSURE: 114 MMHG

## 2018-08-22 DIAGNOSIS — Z00.00 ROUTINE HISTORY AND PHYSICAL EXAMINATION OF ADULT: Primary | ICD-10-CM

## 2018-08-22 DIAGNOSIS — N52.9 ERECTILE DYSFUNCTION, UNSPECIFIED ERECTILE DYSFUNCTION TYPE: ICD-10-CM

## 2018-08-22 DIAGNOSIS — J33.9 NASAL POLYP: ICD-10-CM

## 2018-08-22 DIAGNOSIS — A60.00 GENITAL HERPES SIMPLEX, UNSPECIFIED SITE: ICD-10-CM

## 2018-08-22 DIAGNOSIS — J45.20 MILD INTERMITTENT ASTHMA WITHOUT COMPLICATION: ICD-10-CM

## 2018-08-22 PROCEDURE — 99396 PREV VISIT EST AGE 40-64: CPT | Performed by: FAMILY MEDICINE

## 2018-08-22 PROCEDURE — 99213 OFFICE O/P EST LOW 20 MIN: CPT | Mod: 25 | Performed by: FAMILY MEDICINE

## 2018-08-22 RX ORDER — ALBUTEROL SULFATE 90 UG/1
2 AEROSOL, METERED RESPIRATORY (INHALATION) EVERY 4 HOURS PRN
Qty: 1 INHALER | Refills: 11 | Status: SHIPPED | OUTPATIENT
Start: 2018-08-22 | End: 2020-10-09

## 2018-08-22 RX ORDER — SILDENAFIL CITRATE 20 MG/1
TABLET ORAL
Qty: 30 TABLET | Refills: 11 | Status: SHIPPED | OUTPATIENT
Start: 2018-08-22 | End: 2020-02-03

## 2018-08-22 RX ORDER — SILDENAFIL CITRATE 20 MG/1
TABLET ORAL
Qty: 30 TABLET | Refills: 11 | Status: CANCELLED | OUTPATIENT
Start: 2018-08-22

## 2018-08-22 RX ORDER — VALACYCLOVIR HYDROCHLORIDE 500 MG/1
1000 TABLET, FILM COATED ORAL 2 TIMES DAILY
Qty: 40 TABLET | Refills: 5 | Status: SHIPPED | OUTPATIENT
Start: 2018-08-22 | End: 2021-04-13

## 2018-08-22 NOTE — MR AVS SNAPSHOT
After Visit Summary   8/22/2018    Marco Stovall    MRN: [id number]           Patient Information     Date Of Birth          1965        Visit Information        Provider Department      8/22/2018 6:40 AM Andrew Andrade MD [facility]        Today's Diagnoses     Routine history and physical examination of adult    -  1    Nasal polyp        Genital herpes simplex, unspecified site        Mild intermittent asthma without complication        Erectile dysfunction, unspecified erectile dysfunction type          Care Instructions      Preventive Health Recommendations  Male Ages 50 - 64    Yearly exam:             See your health care provider every year in order to  o   Review health changes.   o   Discuss preventive care.    o   Review your medicines if your doctor has prescribed any.     Have a cholesterol test every 5 years, or more frequently if you are at risk for high cholesterol/heart disease.     Have a diabetes test (fasting glucose) every three years. If you are at risk for diabetes, you should have this test more often.     Have a colonoscopy at age 50, or have a yearly FIT test (stool test). These exams will check for colon cancer.      Talk with your health care provider about whether or not a prostate cancer screening test (PSA) is right for you.    You should be tested each year for STDs (sexually transmitted diseases), if you re at risk.     Shots: Get a flu shot each year. Get a tetanus shot every 10 years.     Nutrition:    Eat at least 5 servings of fruits and vegetables daily.     Eat whole-grain bread, whole-wheat pasta and brown rice instead of white grains and rice.     Get adequate Calcium and Vitamin D.     Lifestyle    Exercise for at least 150 minutes a week (30 minutes a day, 5 days a week). This will help you control your weight and prevent disease.     Limit alcohol to one drink per day.     No smoking.     Wear sunscreen to prevent skin cancer.      See your dentist every six months for an exam and cleaning.     See your eye doctor every 1 to 2 years.              Thank you for choosing [facility].  You may be receiving a survey in the mail from Augustine Yousif regarding your visit today.  Please take a few minutes to complete and return the survey to let us know how we are doing.      If you have questions or concerns, please contact us via [employer] or you can contact your care team at [phone].    Our Clinic hours are:  Monday 6:40 am  to 7:00 pm  Tuesday -Friday 6:40 am to 5:00 pm    The [place] outpatient lab hours are:  Monday - Friday 6:10 am to 4:45 pm  Saturdays 7:00 am to 11:00 am  Appointments are required, call [phone]    If you have clinical questions after hours or would like to schedule an appointment,  call the clinic at [phone].        ASSESSMENT/PLAN:   1. Routine history and physical examination of adult  COUNSELING:  Reviewed preventive health counseling, as reflected in patient instructions       Regular exercise       Healthy diet/nutrition       Vision screening       Hearing screening    BP Readings from Last 1 Encounters:   08/22/18 114/82      reports that he has never smoked. He has never used smokeless tobacco.  Counseling Resources:  ATP IV Guidelines  Pooled Cohorts Equation Calculator  FRAX Risk Assessment  ICSI Preventive Guidelines  Dietary Guidelines for Americans, 2010  USDA's MyPlate  ASA Prophylaxis  Lung CA Screening  We discussed the colonoscopy and he is considering it. Call our clinic RN at [phone] to order it.     2. Nasal polyp  For the history of the nasal polyps since they are symptomatic the referral to our ENT is done and call [phone] for the appt.   - OTOLARYNGOLOGY REFERRAL    3. Genital herpes simplex, unspecified site  For the genital herpes, we discussed using Valtrex at 1000 mg per dose, twice daily for 7-10 days. Refills are given.   - valACYclovir (VALTREX) 500 MG tablet; Take 2 tablets  (1,000 mg) by mouth 2 times daily for 10 days  Dispense: 40 tablet; Refill: 5    4. Mild intermittent asthma without complication  For the asthma symptoms the Albuterol is refilled with instructions. Get the flu shot in the fall.   - albuterol (PROAIR HFA/PROVENTIL HFA/VENTOLIN HFA) 108 (90 Base) MCG/ACT inhaler; Inhale 2 puffs into the lungs every 4 hours as needed for shortness of breath / dyspnea  Dispense: 1 Inhaler; Refill: 11    5. Erectile dysfunction, unspecified erectile dysfunction type  The Sildenafil is refilled with instructions.   - sildenafil (REVATIO) 20 MG tablet; Take 1 tablet (20 mg) by mouth three times daily for pulmonary hypertension.  Dispense: 30 tablet; Refill: 11          Follow-ups after your visit        Additional Services     OTOLARYNGOLOGY REFERRAL       Your provider has referred you to: [facility]: [facility] [phone]   [email or web address]    Please be aware that coverage of these services is subject to the terms and limitations of your health insurance plan.  Call member services at your health plan with any benefit or coverage questions.      Please bring the following with you to your appointment:    (1) Any X-Rays, CTs or MRIs which have been performed.  Contact the facility where they were done to arrange for  prior to your scheduled appointment.   (2) List of current medications  (3) This referral request   (4) Any documents/labs given to you for this referral                  Who to contact     If you have questions or need follow up information about today's clinic visit or your schedule please contact [facility] directly at [phone].  Normal or non-critical lab and imaging results will be communicated to you by MyChart, letter or phone within 4 business days after the clinic has received the results. If you do not hear from us within 7 days, please contact the clinic through MyChart or phone. If you have a  "critical or abnormal lab result, we will notify you by phone as soon as possible.  Submit refill requests through [employer] [facility] [employer] or call your pharmacy and they will forward the refill request to us. Please allow 3 business days for your refill to be completed.          Additional Information About Your Visit        Care EveryWhere ID     This is your Care EveryWhere ID. This could be used by other organizations to access your [facility] medical records  [id number]        Your Vitals Were     Pulse Temperature Height Pulse Oximetry BMI (Body Mass Index)       53 96.6  F (35.9  C) (Tympanic) 5' 8\" (1.727 m) 98% 26.91 kg/m2        Blood Pressure from Last 3 Encounters:   08/22/18 114/82   02/15/18 126/75   12/02/17 146/79    Weight from Last 3 Encounters:   08/22/18 177 lb (80.3 kg)   12/02/17 160 lb (72.6 kg)   11/30/17 162 lb (73.5 kg)              We Performed the Following     Asthma Action Plan (AAP)     OTOLARYNGOLOGY REFERRAL          Today's Medication Changes          These changes are accurate as of 8/22/18  7:27 AM.  If you have any questions, ask your nurse or doctor.               Start taking these medicines.        Dose/Directions    valACYclovir 500 MG tablet   Commonly known as:  VALTREX   Used for:  Genital herpes simplex, unspecified site   Started by:  Andrew Andrade MD        Dose:  1000 mg   Take 2 tablets (1,000 mg) by mouth 2 times daily for 10 days   Quantity:  40 tablet   Refills:  5         These medicines have changed or have updated prescriptions.        Dose/Directions    sildenafil 20 MG tablet   Commonly known as:  REVATIO   This may have changed:  additional instructions   Used for:  Erectile dysfunction, unspecified erectile dysfunction type   Changed by:  Andrew Andrade MD        Take 1 tablet (20 mg) by mouth three times daily for pulmonary hypertension.   Quantity:  30 tablet   Refills:  11            Where to get your medicines      These medications were sent to [facility] [place], [place] - [address]  [address] [address]     Phone:  [phone]     albuterol 108 (90 Base) MCG/ACT inhaler    sildenafil 20 MG tablet    valACYclovir 500 MG tablet                Primary Care Provider Office Phone # Fax #    Andrew Serg Andrade [phone]       [address] [address]        Equal Access to Services     OK YEAGER : Hadii aad ku hadasho Soomaali, waaxda luqadaha, qaybta kaalmada adeegyada, waxay idiin hayaan adeeg kharakayla lacory . So [facility] [phone].    ATENCIÓN: Si habla español, tiene a adame disposición servicios gratuitos de asistencia lingüística. [facility] [phone].    We comply with applicable federal civil rights laws and [place] laws. We do not discriminate on the basis of race, color, national origin, age, disability, sex, sexual orientation, or gender identity.            Thank you!     Thank you for choosing [facility]  for your care. Our goal is always to provide you with excellent care. Hearing back from our patients is one way we can continue to improve our services. Please take a few minutes to complete the written survey that you may receive in the mail after your visit with us. Thank you!             Your Updated Medication List - Protect others around you: Learn how to safely use, store and throw away your medicines at www.disposemymeds.org.          This list is accurate as of 8/22/18  7:27 AM.  Always use your most recent med list.                   Brand Name Dispense Instructions for use Diagnosis    albuterol 108 (90 Base) MCG/ACT inhaler    PROAIR HFA/PROVENTIL HFA/VENTOLIN HFA    1 Inhaler    Inhale 2 puffs into the lungs every 4 hours as needed for shortness of breath / dyspnea    Mild intermittent asthma without complication       multivitamin per tablet     100 tablet    Take 1 tablet by mouth daily.    Routine general medical examination at a health care facility        sildenafil 20 MG tablet    REVATIO    30 tablet    Take 1 tablet (20 mg) by mouth three times daily for pulmonary hypertension.    Erectile dysfunction, unspecified erectile dysfunction type       valACYclovir 500 MG tablet    VALTREX    40 tablet    Take 2 tablets (1,000 mg) by mouth 2 times daily for 10 days    Genital herpes simplex, unspecified site

## 2018-08-22 NOTE — PATIENT INSTRUCTIONS
Preventive Health Recommendations  Male Ages 50 - 64    Yearly exam:             See your health care provider every year in order to  o   Review health changes.   o   Discuss preventive care.    o   Review your medicines if your doctor has prescribed any.     Have a cholesterol test every 5 years, or more frequently if you are at risk for high cholesterol/heart disease.     Have a diabetes test (fasting glucose) every three years. If you are at risk for diabetes, you should have this test more often.     Have a colonoscopy at age 50, or have a yearly FIT test (stool test). These exams will check for colon cancer.      Talk with your health care provider about whether or not a prostate cancer screening test (PSA) is right for you.    You should be tested each year for STDs (sexually transmitted diseases), if you re at risk.     Shots: Get a flu shot each year. Get a tetanus shot every 10 years.     Nutrition:    Eat at least 5 servings of fruits and vegetables daily.     Eat whole-grain bread, whole-wheat pasta and brown rice instead of white grains and rice.     Get adequate Calcium and Vitamin D.     Lifestyle    Exercise for at least 150 minutes a week (30 minutes a day, 5 days a week). This will help you control your weight and prevent disease.     Limit alcohol to one drink per day.     No smoking.     Wear sunscreen to prevent skin cancer.     See your dentist every six months for an exam and cleaning.     See your eye doctor every 1 to 2 years.              Thank you for choosing [facility].  You may be receiving a survey in the mail from Augustine Yousif regarding your visit today.  Please take a few minutes to complete and return the survey to let us know how we are doing.      If you have questions or concerns, please contact us via [employer] or you can contact your care team at [phone].    Our Clinic hours are:  Monday 6:40 am  to 7:00 pm  Tuesday -Friday 6:40 am to 5:00 pm    The [facility]  lab hours are:  Monday - Friday 6:10 am to 4:45 pm  Saturdays 7:00 am to 11:00 am  Appointments are required, call [phone]    If you have clinical questions after hours or would like to schedule an appointment,  call the clinic at [phone].        ASSESSMENT/PLAN:   1. Routine history and physical examination of adult  COUNSELING:  Reviewed preventive health counseling, as reflected in patient instructions       Regular exercise       Healthy diet/nutrition       Vision screening       Hearing screening    BP Readings from Last 1 Encounters:   08/22/18 114/82      reports that he has never smoked. He has never used smokeless tobacco.  Counseling Resources:  ATP IV Guidelines  Pooled Cohorts Equation Calculator  FRAX Risk Assessment  ICSI Preventive Guidelines  Dietary Guidelines for Americans, 2010  [employer]'s MyPlate  ASA Prophylaxis  Lung CA Screening  We discussed the colonoscopy and he is considering it. Call our clinic RN at [phone] to order it.     2. Nasal polyp  For the history of the nasal polyps since they are symptomatic the referral to our ENT is done and call [phone] for the appt.   - OTOLARYNGOLOGY REFERRAL    3. Genital herpes simplex, unspecified site  For the genital herpes, we discussed using Valtrex at 1000 mg per dose, twice daily for 7-10 days. Refills are given.   - valACYclovir (VALTREX) 500 MG tablet; Take 2 tablets (1,000 mg) by mouth 2 times daily for 10 days  Dispense: 40 tablet; Refill: 5    4. Mild intermittent asthma without complication  For the asthma symptoms the Albuterol is refilled with instructions. Get the flu shot in the fall.   - albuterol (PROAIR HFA/PROVENTIL HFA/VENTOLIN HFA) 108 (90 Base) MCG/ACT inhaler; Inhale 2 puffs into the lungs every 4 hours as needed for shortness of breath / dyspnea  Dispense: 1 Inhaler; Refill: 11    5. Erectile dysfunction, unspecified erectile dysfunction type  The Sildenafil is refilled with instructions.   - sildenafil (REVATIO) 20 MG  tablet; Take 1 tablet (20 mg) by mouth three times daily for pulmonary hypertension.  Dispense: 30 tablet; Refill: 11

## 2018-08-22 NOTE — TELEPHONE ENCOUNTER
Prior Authorization Retail Medication Request    Medication/Dose: sildenafil  ICD code (if different than what is on RX):  pulmonary hypertension.(see prescription)  Previously Tried and Failed: viagra 100mg  Rationale:  Patient has used in the past with success    Insurance Name:  [employer] [place]  Insurance ID:  [id number]      Pharmacy Information (if different than what is on RX)  Name:  [facility]  Phone:  [phone]    Fax to insurance: [phone]

## 2018-08-22 NOTE — PROGRESS NOTES
SUBJECTIVE:   CC: Marco Stovall is an 52 year old male who presents for preventative health visit.     Healthy Habits:    Do you get at least three servings of calcium containing foods daily (dairy, green leafy vegetables, etc.)? yes    Amount of exercise or daily activities, outside of work: Not currently.    Problems taking medications regularly No    Medication side effects: No    Have you had an eye exam in the past two years? Unsure.    Do you see a dentist twice per year? no    Do you have sleep apnea, excessive snoring or daytime drowsiness?no       Chief Complaint   Patient presents with     Physical     General physical exam.     Lipids     He is fasting today for labs if needed.     Health Maintenance     Discuss about colon cancer screening.         Today's PHQ-2 Score:   PHQ-2 ( 1999 [employer]) 8/22/2018 5/15/2017   Q1: Little interest or pleasure in doing things 0 0   Q2: Feeling down, depressed or hopeless 0 0   PHQ-2 Score 0 0       Abuse: Current or Past(Physical, Sexual or Emotional)- No  Do you feel safe in your environment - Yes    Social History   Substance Use Topics     Smoking status: Never Smoker     Smokeless tobacco: Never Used     Alcohol use Yes      Comment: wine or mixed 2 a month or more.      If you drink alcohol do you typically have >3 drinks per day or >7 drinks per week? No                      Last PSA: No results found for: PSA    Reviewed orders with patient. Reviewed health maintenance and updated orders accordingly - Yes  Reviewed and updated as needed this visit by clinical staff  Tobacco  Allergies  Med Hx  Surg Hx  Fam Hx  Soc Hx      Reviewed and updated as needed this visit by Provider    ROS:  CONSTITUTIONAL: NEGATIVE for fever, chills, change in weight  INTEGUMENTARY/SKIN: NEGATIVE for worrisome rashes, moles or lesions  INTEGUMENTARY/SKIN: hx of genital herpes and he is interested in medication.   EYES: NEGATIVE for vision changes or irritation  ENT: he has  "symptomatic nasal polyps.   RESP: NEGATIVE for significant cough or SOB  CV: NEGATIVE for chest pain, palpitations or peripheral edema  GI: NEGATIVE for nausea, abdominal pain, heartburn, or change in bowel habits   male: negative for dysuria, hematuria, decreased urinary stream, erectile dysfunction, urethral discharge  MUSCULOSKELETAL: NEGATIVE for significant arthralgias or myalgia  NEURO: NEGATIVE for weakness, dizziness or paresthesias  PSYCHIATRIC: NEGATIVE for changes in mood or affect    OBJECTIVE:   /82  Pulse 53  Temp 96.6  F (35.9  C) (Tympanic)  Ht 5' 8\" (1.727 m)  Wt 177 lb (80.3 kg)  SpO2 98%  BMI 26.91 kg/m2  EXAM:  GENERAL APPEARANCE: healthy, alert and no distress  EYES: EOMI,  PERRL  HENT: ear canals and TM's normal and nose and mouth without ulcers or lesions  HENT: the nares are congested.   NECK: no adenopathy, no asymmetry, masses, or scars and thyroid normal to palpation  RESP: lungs clear to auscultation - no rales, rhonchi or wheezes  CV: regular rates and rhythm, normal S1 S2, no S3 or S4 and no murmur, click or rub -  ABDOMEN:  soft, nontender, no HSM or masses and bowel sounds normal  GU_male: testicles normal without atrophy or masses, no hernias and penis normal without urethral discharge  MS: extremities normal- no gross deformities noted, no evidence of inflammation in joints, FROM in all extremities.  SKIN: no suspicious lesions or rashes  NEURO: Normal strength and tone, sensory exam grossly normal, mentation intact and speech normal  PSYCH: mentation appears normal and affect normal/bright  LYMPHATICS: No axillary, cervical, inguinal, or supraclavicular nodes      ASSESSMENT/PLAN:   1. Routine history and physical examination of adult  COUNSELING:  Reviewed preventive health counseling, as reflected in patient instructions       Regular exercise       Healthy diet/nutrition       Vision screening       Hearing screening    BP Readings from Last 1 Encounters: "   08/22/18 114/82      reports that he has never smoked. He has never used smokeless tobacco.  Counseling Resources:  ATP IV Guidelines  Pooled Cohorts Equation Calculator  FRAX Risk Assessment  ICSI Preventive Guidelines  Dietary Guidelines for Americans, 2010  USDA's MyPlate  ASA Prophylaxis  Lung CA Screening  We discussed the colonoscopy and he is considering it. Call our clinic RN at [phone] to order it.     2. Nasal polyp  For the history of the nasal polyps since they are symptomatic the referral to our ENT is done and call [phone] for the appt.   - OTOLARYNGOLOGY REFERRAL    3. Genital herpes simplex, unspecified site  For the genital herpes, we discussed using Valtrex at 1000 mg per dose, twice daily for 7-10 days. Refills are given.   - valACYclovir (VALTREX) 500 MG tablet; Take 2 tablets (1,000 mg) by mouth 2 times daily for 10 days  Dispense: 40 tablet; Refill: 5    4. Mild intermittent asthma without complication  For the asthma symptoms the Albuterol is refilled with instructions. Get the flu shot in the fall.   - albuterol (PROAIR HFA/PROVENTIL HFA/VENTOLIN HFA) 108 (90 Base) MCG/ACT inhaler; Inhale 2 puffs into the lungs every 4 hours as needed for shortness of breath / dyspnea  Dispense: 1 Inhaler; Refill: 11    5. Erectile dysfunction, unspecified erectile dysfunction type  The Sildenafil is refilled with instructions.   - sildenafil (REVATIO) 20 MG tablet; Take 1 tablet (20 mg) by mouth three times daily for pulmonary hypertension.  Dispense: 30 tablet; Refill: 11      Andrew Andrade MD  [facility]

## 2018-08-22 NOTE — LETTER
My Asthma Action Plan  Name: Marco Stovall   YOB: 1965  Date: 8/22/2018   My doctor: Andrew Andrade MD   My clinic: [facility]        My Control Medicine:   My Rescue Medicine: Albuterol (Proair/Ventolin/Proventil) inhaler As needed.   My Asthma Severity: intermittent  Avoid your asthma triggers: A list of triggers is enclosed with your letter.               GREEN ZONE   Good Control    I feel good    No cough or wheeze    Can work, sleep and play without asthma symptoms       Take your asthma control medicine every day.     1. If exercise triggers your asthma, take your rescue medication    15 minutes before exercise or sports, and    During exercise if you have asthma symptoms  2. Spacer to use with inhaler: If you have a spacer, make sure to use it with your inhaler             YELLOW ZONE Getting Worse  I have ANY of these:    I do not feel good    Cough or wheeze    Chest feels tight    Wake up at night   1. Keep taking your Green Zone medications  2. Start taking your rescue medicine:    every 20 minutes for up to 1 hour. Then every 4 hours for 24-48 hours.  3. If you stay in the Yellow Zone for more than 12-24 hours, contact your doctor.  4. If you do not return to the Green Zone in 12-24 hours or you get worse, start taking your oral steroid medicine if prescribed by your provider.           RED ZONE Medical Alert - Get Help  I have ANY of these:    I feel awful    Medicine is not helping    Breathing getting harder    Trouble walking or talking    Nose opens wide to breathe       1. Take your rescue medicine NOW  2. If your provider has prescribed an oral steroid medicine, start taking it NOW  3. Call your doctor NOW  4. If you are still in the Red Zone after 20 minutes and you have not reached your doctor:    Take your rescue medicine again and    Call 911 or go to the emergency room right away    See your regular doctor within 2 weeks of an Emergency Room or Urgent  Care visit for follow-up treatment.          Annual Reminders:  Meet with Asthma Educator,  Flu Shot in the Fall, consider Pneumonia Vaccination for patients with asthma (aged 19 and older).    Pharmacy[place]    [facility] [place] - [address] [id number] - [place], [place] - [address]                      Asthma Triggers  How To Control Things That Make Your Asthma Worse    Triggers are things that make your asthma worse.  Look at the list below to help you find your triggers and what you can do about them.  You can help prevent asthma flare-ups by staying away from your triggers.      Trigger                                                          What you can do   Cigarette Smoke  Tobacco smoke can make asthma worse. Do not allow smoking in your home, car or around you.  Be sure no one smokes at a child s day care or school.  If you smoke, ask your health care provider for ways to help you quit.  Ask family members to quit too.  Ask your health care provider for a referral to Quit Plan to help you quit smoking, or call 9-985-015-PLAN.     Colds, Flu, Bronchitis  These are common triggers of asthma. Wash your hands often.  Don t touch your eyes, nose or mouth.  Get a flu shot every year.     Dust Mites  These are tiny bugs that live in cloth or carpet. They are too small to see. Wash sheets and blankets in hot water every week.   Encase pillows and mattress in dust mite proof covers.  Avoid having carpet if you can. If you have carpet, vacuum weekly.   Use a dust mask and HEPA vacuum.   Pollen and Outdoor Mold  Some people are allergic to trees, grass, or weed pollen, or molds. Try to keep your windows closed.  Limit time out doors when pollen count is high.   Ask you health care provider about taking medicine during allergy season.     Animal Dander  Some people are allergic to skin flakes, urine or saliva from pets with fur or feathers. Keep pets with fur or feathers out  of your home.    If you can t keep the pet outdoors, then keep the pet out of your bedroom.  Keep the bedroom door closed.  Keep pets off cloth furniture and away from stuffed toys.     Mice, Rats, and Cockroaches  Some people are allergic to the waste from these pests.   Cover food and garbage.  Clean up spills and food crumbs.  Store grease in the refrigerator.   Keep food out of the bedroom.   Indoor Mold  This can be a trigger if your home has high moisture. Fix leaking faucets, pipes, or other sources of water.   Clean moldy surfaces.  Dehumidify basement if it is damp and smelly.   Smoke, Strong Odors, and Sprays  These can reduce air quality. Stay away from strong odors and sprays, such as perfume, powder, hair spray, paints, smoke incense, paint, cleaning products, candles and new carpet.   Exercise or Sports  Some people with asthma have this trigger. Be active!  Ask your doctor about taking medicine before sports or exercise to prevent symptoms.    Warm up for 5-10 minutes before and after sports or exercise.     Other Triggers of Asthma  Cold air:  Cover your nose and mouth with a scarf.  Sometimes laughing or crying can be a trigger.  Some medicines and food can trigger asthma.

## 2018-08-23 NOTE — TELEPHONE ENCOUNTER
Central Prior Authorization Team   Phone: [phone]    PA Initiation    Medication: Sildenafil 20mg  Insurance Company: [employer] [place] - Phone [phone] Fax [phone]  Pharmacy Filling the Rx: [facility] [place], [place] - [address]  Filling Pharmacy Phone: [phone]  Filling Pharmacy Fax:    Start Date: 8/23/2018

## 2018-08-24 ASSESSMENT — ASTHMA QUESTIONNAIRES: ACT_TOTALSCORE: 21

## 2018-08-24 NOTE — TELEPHONE ENCOUNTER
PA for sildenafil has been denied.  Pharmacy notified.     Rational: Plan does not covered medications prescribed to treat ED -

## 2018-08-24 NOTE — TELEPHONE ENCOUNTER
PRIOR AUTHORIZATION DENIED    Medication: Sildenafil 20mg    Denial Date: 8/24/2018    Denial Rational: Plan does not covered medications prescribed to treat ED        Appeal Information: n/a

## 2018-08-29 ENCOUNTER — OFFICE VISIT (OUTPATIENT)
Dept: OTOLARYNGOLOGY | Facility: CLINIC | Age: 53
End: 2018-08-29
Payer: COMMERCIAL

## 2018-08-29 VITALS
HEART RATE: 65 BPM | SYSTOLIC BLOOD PRESSURE: 121 MMHG | WEIGHT: 177 LBS | TEMPERATURE: 97.8 F | BODY MASS INDEX: 26.91 KG/M2 | DIASTOLIC BLOOD PRESSURE: 85 MMHG

## 2018-08-29 DIAGNOSIS — J45.909 DISORDER OF RESPIRATORY SYSTEM EXACERBATED BY ASPIRIN: ICD-10-CM

## 2018-08-29 DIAGNOSIS — J33.9 DISORDER OF RESPIRATORY SYSTEM EXACERBATED BY ASPIRIN: ICD-10-CM

## 2018-08-29 DIAGNOSIS — J33.9 NASAL POLYPOSIS: Primary | ICD-10-CM

## 2018-08-29 DIAGNOSIS — Z88.6 DISORDER OF RESPIRATORY SYSTEM EXACERBATED BY ASPIRIN: ICD-10-CM

## 2018-08-29 PROCEDURE — 99204 OFFICE O/P NEW MOD 45 MIN: CPT | Performed by: OTOLARYNGOLOGY

## 2018-08-29 RX ORDER — METHYLPREDNISOLONE 4 MG
TABLET, DOSE PACK ORAL
Qty: 21 TABLET | Refills: 0 | Status: SHIPPED | OUTPATIENT
Start: 2018-08-29 | End: 2018-09-14

## 2018-08-29 NOTE — NURSING NOTE
"Initial /85 (BP Location: Right arm, Patient Position: Chair, Cuff Size: Adult Regular)  Pulse 65  Temp 97.8  F (36.6  C) (Oral)  Wt 80.3 kg (177 lb)  BMI 26.91 kg/m2 Estimated body mass index is 26.91 kg/(m^2) as calculated from the following:    Height as of 8/22/18: 1.727 m (5' 8\").    Weight as of this encounter: 80.3 kg (177 lb). .    Kati Guillen LPN    "

## 2018-08-29 NOTE — MR AVS SNAPSHOT
After Visit Summary   8/29/2018    Marco Stovall    MRN: [id number]           Patient Information     Date Of Birth          1965        Visit Information        Provider Department      8/29/2018 3:45 PM Chelsea Ji MD [facility]        Today's Diagnoses     Nasal polyposis    -  1    Disorder of respiratory system exacerbated by aspirin          Care Instructions    Per physician's instructions            Follow-ups after your visit        Additional Services     ALLERGY/ASTHMA ADULT REFERRAL       Your provider has referred you to: [facility]: [facility] [phone] [email or web address]    Please be aware that coverage of these services is subject to the terms and limitations of your health insurance plan.  Call member services at your health plan with any benefit or coverage questions.      Please bring the following with you to your appointment:    (1) Any X-Rays, CTs or MRIs which have been performed.  Contact the facility where they were done to arrange for  prior to your scheduled appointment.    (2) List of current medications  (3) This referral request   (4) Any documents/labs given to you for this referral                  Who to contact     If you have questions or need follow up information about today's clinic visit or your schedule please contact [facility] directly at [phone].  Normal or non-critical lab and imaging results will be communicated to you by MyChart, letter or phone within 4 business days after the clinic has received the results. If you do not hear from us within 7 days, please contact the clinic through MyChart or phone. If you have a critical or abnormal lab result, we will notify you by phone as soon as possible.  Submit refill requests through [employer] or call your pharmacy and they will forward the refill request to us. Please allow 3 business days for your refill to be  completed.          Additional Information About Your Visit        Care EveryWhere ID     This is your Care EveryWhere ID. This could be used by other organizations to access your [facility] medical records  [id number]        Your Vitals Were     Pulse Temperature BMI (Body Mass Index)             65 97.8  F (36.6  C) (Oral) 26.91 kg/m2          Blood Pressure from Last 3 Encounters:   08/29/18 121/85   08/22/18 114/82   02/15/18 126/75    Weight from Last 3 Encounters:   08/29/18 80.3 kg (177 lb)   08/22/18 80.3 kg (177 lb)   12/02/17 72.6 kg (160 lb)              We Performed the Following     ALLERGY/ASTHMA ADULT REFERRAL          Today's Medication Changes          These changes are accurate as of 8/29/18  4:21 PM.  If you have any questions, ask your nurse or doctor.               Start taking these medicines.        Dose/Directions    methylPREDNISolone 4 MG tablet   Commonly known as:  MEDROL DOSEPAK   Used for:  Nasal polyposis   Started by:  Chelsea Ji MD        Follow package instructions   Quantity:  21 tablet   Refills:  0            Where to get your medicines      These medications were sent to [facility] [place] [address]  [address] [address]     Phone:  [phone]     methylPREDNISolone 4 MG tablet                Primary Care Provider Office Phone # Fax #    Andrew Serg Andrade [phone]       [address] [address]        Equal Access to Services     OK YEAGER AH: Hadii юлия andersono Sojames, waaxda luqadaha, qaybta kaalmada laurel, radha chauhan. So [facility] [phone].    ATENCIÓN: Si habla español, tiene a adame disposición servicios gratuitos de asistencia lingüística. Llame al [phone].    We comply with applicable federal civil rights laws and [place] laws. We do not discriminate on the basis of race, color, national origin, age, disability, sex, sexual orientation, or  gender identity.            Thank you!     Thank you for choosing [facility]  for your care. Our goal is always to provide you with excellent care. Hearing back from our patients is one way we can continue to improve our services. Please take a few minutes to complete the written survey that you may receive in the mail after your visit with us. Thank you!             Your Updated Medication List - Protect others around you: Learn how to safely use, store and throw away your medicines at www.disposemymeds.org.          This list is accurate as of 8/29/18  4:21 PM.  Always use your most recent med list.                   Brand Name Dispense Instructions for use Diagnosis    albuterol 108 (90 Base) MCG/ACT inhaler    PROAIR HFA/PROVENTIL HFA/VENTOLIN HFA    1 Inhaler    Inhale 2 puffs into the lungs every 4 hours as needed for shortness of breath / dyspnea    Mild intermittent asthma without complication       methylPREDNISolone 4 MG tablet    MEDROL DOSEPAK    21 tablet    Follow package instructions    Nasal polyposis       multivitamin per tablet     100 tablet    Take 1 tablet by mouth daily.    Routine general medical examination at a health care facility       sildenafil 20 MG tablet    REVATIO    30 tablet    Take 1 tablet (20 mg) by mouth three times daily for pulmonary hypertension.    Erectile dysfunction, unspecified erectile dysfunction type       valACYclovir 500 MG tablet    VALTREX    40 tablet    Take 2 tablets (1,000 mg) by mouth 2 times daily for 10 days    Genital herpes simplex, unspecified site

## 2018-08-29 NOTE — PROGRESS NOTES
History of Present Illness - Marco Stovall is a 52 year old male who presents in consultation today at the request of Dr. Andrade with concerns for nasal polyps. He has undergone multiple sinus surgeries by Dr. Yang, most recently in 2014 when he underwent bilateral total ethmoidectomy, bilateral maxillary antrostomy, and bilateral frontal sinusotomy. He also has NSAID sensitivity and asthma.      Past Medical History -   Patient Active Problem List   Diagnosis     Screening for hypertension     Chronic maxillary sinusitis     Hyperlipidemia LDL goal <130     Mild intermittent asthma without complication     Nasal polyp     Genital herpes simplex, unspecified site       Current Medications -   Current Outpatient Prescriptions:      methylPREDNISolone (MEDROL DOSEPAK) 4 MG tablet, Follow package instructions, Disp: 21 tablet, Rfl: 0     Multiple Vitamin (MULTIVITAMIN) per tablet, Take 1 tablet by mouth daily., Disp: 100 tablet, Rfl: 12     albuterol (PROAIR HFA/PROVENTIL HFA/VENTOLIN HFA) 108 (90 Base) MCG/ACT inhaler, Inhale 2 puffs into the lungs every 4 hours as needed for shortness of breath / dyspnea (Patient not taking: Reported on 8/29/2018), Disp: 1 Inhaler, Rfl: 11     sildenafil (REVATIO) 20 MG tablet, Take 1 tablet (20 mg) by mouth three times daily for pulmonary hypertension., Disp: 30 tablet, Rfl: 11     valACYclovir (VALTREX) 500 MG tablet, Take 2 tablets (1,000 mg) by mouth 2 times daily for 10 days (Patient not taking: Reported on 8/29/2018), Disp: 40 tablet, Rfl: 5    Allergies -   Allergies   Allergen Reactions     Advil [Ibuprofen Micronized] Other (See Comments)     Tight chest     Asa [Aspirin] Nausea and Vomiting     Asthma - tight chest     Naprosyn [Naproxen] GI Disturbance     Stomach        Social History -   Social History     Social History     Marital status:      Spouse name: N/A     Number of children: N/A     Years of education: N/A     Social History Main Topics      Smoking status: Never Smoker     Smokeless tobacco: Never Used     Alcohol use Yes      Comment: wine or mixed 2 a month or more.     Drug use: No     Sexual activity: Yes     Partners: Female     Other Topics Concern     Parent/Sibling W/ Cabg, Mi Or Angioplasty Before 65f 55m? No      Service No     Blood Transfusions No     Caffeine Concern Yes     2 cups a day     Occupational Exposure No     Hobby Hazards Yes     motorcycle     Sleep Concern No     Stress Concern No     Weight Concern No     Special Diet Yes     multi vit     Back Care No     Exercise No     Bike Helmet Yes     Seat Belt Yes     Social History Narrative       Family History -   Family History   Problem Relation Age of Onset     Hypertension Mother      Thyroid Disease Mother      hypothyroidism     Hypertension Father      Diabetes Maternal Grandfather      Thyroid Disease Daughter      hypothyroidism     Asthma No family hx of      C.A.D. No family hx of      Cerebrovascular Disease No family hx of      Breast Cancer No family hx of      Cancer - colorectal No family hx of      Prostate Cancer No family hx of        Review of Systems - As per HPI and PMHx, otherwise 7 system review of the head and neck negative. 10+ system review negative.    Physical Exam  /85 (BP Location: Right arm, Patient Position: Chair, Cuff Size: Adult Regular)  Pulse 65  Temp 97.8  F (36.6  C) (Oral)  Wt 80.3 kg (177 lb)  BMI 26.91 kg/m2  General - The patient is well nourished and well developed, and appears to have good nutritional status.  Alert and oriented to person and place, answers questions and cooperates with examination appropriately.   Head and Face - Normocephalic and atraumatic, with no gross asymmetry noted of the contour of the facial features.  The facial nerve is intact, with strong symmetric movements.  Voice and Breathing - The patient was breathing comfortably without the use of accessory muscles. There was no wheezing,  stridor, or stertor.  The patients voice was clear and strong, and had appropriate pitch and quality.  Ears - Bilateral pinna and EACs with normal appearing overlying skin. Tympanic membrane intact with good mobility on pneumatic otoscopy bilaterally. Bony landmarks of the ossicular chain are normal. The tympanic membranes are normal in appearance. No retraction, perforation, or masses.  No fluid or purulence was seen in the external canal or the middle ear.   Eyes - Extraocular movements intact.  Sclera were not icteric or injected, conjunctiva were pink and moist.  Mouth - Examination of the oral cavity showed pink, healthy oral mucosa. No lesions or ulcerations noted.  The tongue was mobile and midline, and the dentition were in good condition.    Throat - The walls of the oropharynx were smooth, pink, moist, symmetric, and had no lesions or ulcerations.  The tonsillar pillars and soft palate were symmetric.  The uvula was midline on elevation.  Neck - Normal midline excursion of the laryngotracheal complex during swallowing.  Full range of motion on passive movement.  Palpation of the occipital, submental, submandibular, internal jugular chain, and supraclavicular nodes did not demonstrate any abnormal lymph nodes or masses.  The carotid pulse was palpable bilaterally.  Palpation of the thyroid was soft and smooth, with no nodules or goiter appreciated.  The trachea was mobile and midline.  Nose - External contour is symmetric, no gross deflection or scars.  Nasal mucosa is pink and moist with no abnormal mucus.  The septum was midline and non-obstructive, turbinates of normal size and position.  Bilateral nasal passages with visible nasal polyps reaching the nasal floor, with restricted nasal airway bilaterally.            Assessment - Marco Stovall is a 52 year old male with nasal polyps and NSAID sensitivity. He has undergone multiple sinus surgeries, and is very interested in methods to prevent ongoing  recurrence. He voices interest in aspirin desensitization. I discussed that I think referral to Allergy for co-management of this issue is a wise choice. I started him on a Medrol Dosepak for now to allow some relief from nasal obstruction. Depending on the opinion of the Allergist, we may proceed with sinus surgery with image guidance before or after desensitization treatment. I discussed that since he has nasal polyps and is a revision case, we should do this case at [facility] in order to utilize image guidance technology.       Dr. Chelsea Ji MD  Otolaryngology  [facility]

## 2018-08-29 NOTE — LETTER
8/29/2018         RE: Marco Stovall  [id number] [facility] [address]        Dear Colleague,    Thank you for referring your patient, Marco Stovall, to the [facility]. Please see a copy of my visit note below.        History of Present Illness - Marco Stovall is a 52 year old male who presents in consultation today at the request of Dr. Andrade with concerns for nasal polyps. He has undergone multiple sinus surgeries by Dr. Yang, most recently in 2014 when he underwent bilateral total ethmoidectomy, bilateral maxillary antrostomy, and bilateral frontal sinusotomy. He also has NSAID sensitivity and asthma.      Past Medical History -   Patient Active Problem List   Diagnosis     Screening for hypertension     Chronic maxillary sinusitis     Hyperlipidemia LDL goal <130     Mild intermittent asthma without complication     Nasal polyp     Genital herpes simplex, unspecified site       Current Medications -   Current Outpatient Prescriptions:      methylPREDNISolone (MEDROL DOSEPAK) 4 MG tablet, Follow package instructions, Disp: 21 tablet, Rfl: 0     Multiple Vitamin (MULTIVITAMIN) per tablet, Take 1 tablet by mouth daily., Disp: 100 tablet, Rfl: 12     albuterol (PROAIR HFA/PROVENTIL HFA/VENTOLIN HFA) 108 (90 Base) MCG/ACT inhaler, Inhale 2 puffs into the lungs every 4 hours as needed for shortness of breath / dyspnea (Patient not taking: Reported on 8/29/2018), Disp: 1 Inhaler, Rfl: 11     sildenafil (REVATIO) 20 MG tablet, Take 1 tablet (20 mg) by mouth three times daily for pulmonary hypertension., Disp: 30 tablet, Rfl: 11     valACYclovir (VALTREX) 500 MG tablet, Take 2 tablets (1,000 mg) by mouth 2 times daily for 10 days (Patient not taking: Reported on 8/29/2018), Disp: 40 tablet, Rfl: 5    Allergies -   Allergies   Allergen Reactions     Advil [Ibuprofen Micronized] Other (See Comments)     Tight chest     Asa [Aspirin] Nausea and Vomiting     Asthma - tight chest      Naprosyn [Naproxen] GI Disturbance     Stomach        Social History -   Social History     Social History     Marital status:      Spouse name: N/A     Number of children: N/A     Years of education: N/A     Social History Main Topics     Smoking status: Never Smoker     Smokeless tobacco: Never Used     Alcohol use Yes      Comment: wine or mixed 2 a month or more.     Drug use: No     Sexual activity: Yes     Partners: Female     Other Topics Concern     Parent/Sibling W/ Cabg, Mi Or Angioplasty Before 65f 55m? No      Service No     Blood Transfusions No     Caffeine Concern Yes     2 cups a day     Occupational Exposure No     Hobby Hazards Yes     motorcycle     Sleep Concern No     Stress Concern No     Weight Concern No     Special Diet Yes     multi vit     Back Care No     Exercise No     Bike Helmet Yes     Seat Belt Yes     Social History Narrative       Family History -   Family History   Problem Relation Age of Onset     Hypertension Mother      Thyroid Disease Mother      hypothyroidism     Hypertension Father      Diabetes Maternal Grandfather      Thyroid Disease Daughter      hypothyroidism     Asthma No family hx of      C.A.D. No family hx of      Cerebrovascular Disease No family hx of      Breast Cancer No family hx of      Cancer - colorectal No family hx of      Prostate Cancer No family hx of        Review of Systems - As per HPI and PMHx, otherwise 7 system review of the head and neck negative. 10+ system review negative.    Physical Exam  /85 (BP Location: Right arm, Patient Position: Chair, Cuff Size: Adult Regular)  Pulse 65  Temp 97.8  F (36.6  C) (Oral)  Wt 80.3 kg (177 lb)  BMI 26.91 kg/m2  General - The patient is well nourished and well developed, and appears to have good nutritional status.  Alert and oriented to person and place, answers questions and cooperates with examination appropriately.   Head and Face - Normocephalic and atraumatic, with no gross  asymmetry noted of the contour of the facial features.  The facial nerve is intact, with strong symmetric movements.  Voice and Breathing - The patient was breathing comfortably without the use of accessory muscles. There was no wheezing, stridor, or stertor.  The patients voice was clear and strong, and had appropriate pitch and quality.  Ears - Bilateral pinna and EACs with normal appearing overlying skin. Tympanic membrane intact with good mobility on pneumatic otoscopy bilaterally. Bony landmarks of the ossicular chain are normal. The tympanic membranes are normal in appearance. No retraction, perforation, or masses.  No fluid or purulence was seen in the external canal or the middle ear.   Eyes - Extraocular movements intact.  Sclera were not icteric or injected, conjunctiva were pink and moist.  Mouth - Examination of the oral cavity showed pink, healthy oral mucosa. No lesions or ulcerations noted.  The tongue was mobile and midline, and the dentition were in good condition.    Throat - The walls of the oropharynx were smooth, pink, moist, symmetric, and had no lesions or ulcerations.  The tonsillar pillars and soft palate were symmetric.  The uvula was midline on elevation.  Neck - Normal midline excursion of the laryngotracheal complex during swallowing.  Full range of motion on passive movement.  Palpation of the occipital, submental, submandibular, internal jugular chain, and supraclavicular nodes did not demonstrate any abnormal lymph nodes or masses.  The carotid pulse was palpable bilaterally.  Palpation of the thyroid was soft and smooth, with no nodules or goiter appreciated.  The trachea was mobile and midline.  Nose - External contour is symmetric, no gross deflection or scars.  Nasal mucosa is pink and moist with no abnormal mucus.  The septum was midline and non-obstructive, turbinates of normal size and position.  Bilateral nasal passages with visible nasal polyps reaching the nasal floor, with  restricted nasal airway bilaterally.            Assessment - Marco Stovall is a 52 year old male with nasal polyps and NSAID sensitivity. He has undergone multiple sinus surgeries, and is very interested in methods to prevent ongoing recurrence. He voices interest in aspirin desensitization. I discussed that I think referral to Allergy for co-management of this issue is a wise choice. I started him on a Medrol Dosepak for now to allow some relief from nasal obstruction. Depending on the opinion of the Allergist, we may proceed with sinus surgery with image guidance before or after desensitization treatment. I discussed that since he has nasal polyps and is a revision case, we should do this case at [facility] in order to utilize image guidance technology.       Dr. Chelsea Ji MD  Otolaryngology  [facility]        Again, thank you for allowing me to participate in the care of your patient.        Sincerely,        Chelsea Ji MD

## 2018-09-14 ENCOUNTER — OFFICE VISIT (OUTPATIENT)
Dept: ALLERGY | Facility: CLINIC | Age: 53
End: 2018-09-14
Payer: COMMERCIAL

## 2018-09-14 VITALS
OXYGEN SATURATION: 98 % | BODY MASS INDEX: 27.29 KG/M2 | DIASTOLIC BLOOD PRESSURE: 80 MMHG | RESPIRATION RATE: 16 BRPM | WEIGHT: 179.45 LBS | HEART RATE: 50 BPM | TEMPERATURE: 96.7 F | SYSTOLIC BLOOD PRESSURE: 124 MMHG

## 2018-09-14 DIAGNOSIS — J45.40 MODERATE PERSISTENT ASTHMA WITHOUT COMPLICATION: ICD-10-CM

## 2018-09-14 DIAGNOSIS — J33.9 NASAL POLYPOSIS: ICD-10-CM

## 2018-09-14 DIAGNOSIS — Z88.6 SAMTER'S TRIAD: ICD-10-CM

## 2018-09-14 DIAGNOSIS — J45.909 SAMTER'S TRIAD: ICD-10-CM

## 2018-09-14 DIAGNOSIS — J33.9 SAMTER'S TRIAD: ICD-10-CM

## 2018-09-14 DIAGNOSIS — J31.0 OTHER CHRONIC RHINITIS: ICD-10-CM

## 2018-09-14 DIAGNOSIS — T50.905A ADVERSE EFFECT OF DRUG, INITIAL ENCOUNTER: Primary | ICD-10-CM

## 2018-09-14 LAB
FEF 25/75: NORMAL
FEV-1: NORMAL
FEV1/FVC: NORMAL
FVC: NORMAL

## 2018-09-14 PROCEDURE — 94060 EVALUATION OF WHEEZING: CPT | Performed by: ALLERGY & IMMUNOLOGY

## 2018-09-14 PROCEDURE — 99244 OFF/OP CNSLTJ NEW/EST MOD 40: CPT | Mod: 25 | Performed by: ALLERGY & IMMUNOLOGY

## 2018-09-14 RX ORDER — MONTELUKAST SODIUM 10 MG/1
10 TABLET ORAL AT BEDTIME
Qty: 30 TABLET | Refills: 3 | Status: SHIPPED | OUTPATIENT
Start: 2018-09-14 | End: 2019-11-18

## 2018-09-14 ASSESSMENT — ENCOUNTER SYMPTOMS
EYE REDNESS: 0
HEADACHES: 0
EYE ITCHING: 0
ARTHRALGIAS: 0
FEVER: 0
FACIAL SWELLING: 0
WHEEZING: 1
SHORTNESS OF BREATH: 0
ACTIVITY CHANGE: 0
ADENOPATHY: 0
NAUSEA: 0
CHEST TIGHTNESS: 1
SINUS PRESSURE: 1
DIARRHEA: 0
MYALGIAS: 0
COUGH: 0
VOMITING: 0
EYE DISCHARGE: 1
FATIGUE: 1
JOINT SWELLING: 0
RHINORRHEA: 1

## 2018-09-14 NOTE — PROGRESS NOTES
SUBJECTIVE:                                                               Marco Stovall presents today to our Allergy Clinic at [facility]; he is being seen in consultation at the request of Dr. Ji.    As you know, he is a 52-year-old male with chronic rhinitis/sinusitis with nasal polyposis, asthma, and aspirin sensitivity, consistent with aspirin-exacerbated respiratory disease.  He had undergone multiple sinus surgeries by Dr. Yang, most recently in 2014 when he underwent bilateral total ethmoidectomy, bilateral maxillary antrostomy, and bilateral frontal sinusotomy.   Recently completed a course of oral steroids for polyps.    Polyps started when he was about 26 years. Was getting polypectomy every 4-5 years.  About 10 years ago, he began to have chronic chest symptoms  (cough, wheeze, SOB and chest tightness), triggered by viral infections, NSAIDs, cold weather exposure, exertion, allergens, and irritants.  Chest symptoms are acutely improved by albuterol use (10 mins). Years ago, he was on some daily inhalers, but he never felt an improvement. He admits he wasn't consistent with them.  On average, he uses albuterol 2-3 times a week. He admits he is putting up with wheezing and uses albuterol only when he feels really tight. He has wheezing frequently, more than 3 times a weeks. No night awakenings due to chest symptoms.  He never takes prednisone for an asthma flare. He has not needed urgent visits with his PCP, an ER/UC, or other healthcare providers in the past year for asthma acting up. He has never been hospitalized for asthma. He has never had CXR-confirmed pneumonia.  20 years ago, he took Excedrin for headache. Within 30-60 min, he developed chest tightness. He also vomited once. He also had rhinoconjunctivitis exacerbation. Took Benadryl and was fine within a few hours. No urticaria, angioedema, and diarrhea. He had 3 similar episodes. Latest episode happened about 5  years ago with Advil.  He tolerates Tylenol without issues.    About 20 years ago, he began to have perennial but seasonally-exacerbated (Fall) chronic nasal symptoms (itch, clear rhinorrhea, stuffiness, and sneezing), postnasal drip and ocular symptoms (itching and watering).  He is not worse around dogs/cats.  He tried Flonase, Nasacort, and Rhinocort. He uses Nasacort on as needed basis. He can't say if it's helpful.   Oral antihistamines (cetirizine/loratadine/fexofenadine/diphenhydramine) have been used, and they were not effective.   There is no history of PE tubes or T/A.  Patient Active Problem List   Diagnosis     Screening for hypertension     Chronic maxillary sinusitis     Hyperlipidemia LDL goal <130     Mild intermittent asthma without complication     Nasal polyp     Genital herpes simplex, unspecified site     Samter's triad       Past Medical History:   Diagnosis Date     Mild intermittent asthma without complication 3/24/2016     Polyp of nasal cavity       Problem (# of Occurrences) Relation (Name,Age of Onset)    Diabetes (1) Maternal Grandfather    Hypertension (2) Mother, Father    Thyroid Disease (2) Mother: hypothyroidism, Daughter: hypothyroidism       Negative family history of: Asthma, C.A.D., Cerebrovascular Disease, Breast Cancer, Cancer - colorectal, Prostate Cancer        Past Surgical History:   Procedure Laterality Date     ENT SURGERY       ETHMOIDECTOMY  6/16/2014    Procedure: ETHMOIDECTOMY;  Surgeon: Jimbo Yang MD;  Location: [facility] OR     SURGICAL HISTORY OF -   2010    Nasal Polyp; 3 total surgeries: 1997     Social History     Social History     Marital status:      Spouse name: N/A     Number of children: N/A     Years of education: N/A     Social History Main Topics     Smoking status: Never Smoker     Smokeless tobacco: Never Used     Alcohol use Yes      Comment: wine or mixed 2 a month or more.     Drug use: No     Sexual activity: Yes     Partners: Female      Other Topics Concern     Parent/Sibling W/ Cabg, Mi Or Angioplasty Before 65f 55m? No      Service No     Blood Transfusions No     Caffeine Concern Yes     2 cups a day     Occupational Exposure No     Hobby Hazards Yes     motorcycle     Sleep Concern No     Stress Concern No     Weight Concern No     Special Diet Yes     multi vit     Back Care No     Exercise No     Bike Helmet Yes     Seat Belt Yes     Social History Narrative    September 14, 2018    ENVIRONMENTAL HISTORY: [place] The home is heated with a forced air. They do have central air conditioning. The patient's bedroom is furnished with carpeting in bedroom and allergen mattress cover.  Pets inside the house include 1 cat(s). There is no history of cockroach or mice infestation. There are no smokers in the house.  The house does not have a damp basement.                Review of Systems   Constitutional: Positive for fatigue. Negative for activity change and fever.   HENT: Positive for congestion, postnasal drip, rhinorrhea, sinus pressure and sneezing. Negative for dental problem, ear pain, facial swelling and nosebleeds.    Eyes: Positive for discharge. Negative for redness and itching.   Respiratory: Positive for chest tightness and wheezing. Negative for cough and shortness of breath.    Cardiovascular: Negative for chest pain.   Gastrointestinal: Negative for diarrhea, nausea and vomiting.   Musculoskeletal: Negative for arthralgias, joint swelling and myalgias.   Skin: Negative for rash.   Neurological: Negative for headaches.   Hematological: Negative for adenopathy.   Psychiatric/Behavioral: Negative for behavioral problems and self-injury.           Current Outpatient Prescriptions:      albuterol (PROAIR HFA/PROVENTIL HFA/VENTOLIN HFA) 108 (90 Base) MCG/ACT inhaler, Inhale 2 puffs into the lungs every 4 hours as needed for shortness of breath / dyspnea, Disp: 1 Inhaler, Rfl:  11     fluticasone-vilanterol (BREO ELLIPTA) 100-25 MCG/INH inhaler, Inhale 1 puff into the lungs daily, Disp: 1 Inhaler, Rfl: 3     montelukast (SINGULAIR) 10 MG tablet, Take 1 tablet (10 mg) by mouth At Bedtime, Disp: 30 tablet, Rfl: 3     Multiple Vitamin (MULTIVITAMIN) per tablet, Take 1 tablet by mouth daily., Disp: 100 tablet, Rfl: 12     valACYclovir (VALTREX) 500 MG tablet, Take 2 tablets (1,000 mg) by mouth 2 times daily for 10 days, Disp: 40 tablet, Rfl: 5     sildenafil (REVATIO) 20 MG tablet, Take 1 tablet (20 mg) by mouth three times daily for pulmonary hypertension. (Patient not taking: Reported on 9/14/2018), Disp: 30 tablet, Rfl: 11  Immunization History   Administered Date(s) Administered     Influenza Vaccine IM 3yrs+ 4 Valent IIV4 10/25/2013, 11/02/2017     Pneumococcal 23 valent 10/25/2013     TDAP Vaccine (Adacel) 10/11/2010     Allergies   Allergen Reactions     Advil [Ibuprofen Micronized] Other (See Comments)     Tight chest     Asa [Aspirin] Nausea and Vomiting     Asthma - tight chest     Naprosyn [Naproxen] GI Disturbance     Stomach      OBJECTIVE:                                                                 /80 (BP Location: Left arm, Patient Position: Sitting, Cuff Size: Adult Regular)  Pulse 50  Temp 96.7  F (35.9  C) (Oral)  Resp 16  Wt 81.4 kg (179 lb 7.3 oz)  SpO2 98%  BMI 27.29 kg/m2        Physical Exam   Constitutional: He is oriented to person, place, and time. No distress.   HENT:   Head: Normocephalic and atraumatic.   Right Ear: Tympanic membrane, external ear and ear canal normal.   Left Ear: Tympanic membrane, external ear and ear canal normal.   Nose: Mucosal edema (mild) present. No rhinorrhea.   Mouth/Throat: Oropharynx is clear and moist and mucous membranes are normal. No oropharyngeal exudate, posterior oropharyngeal edema or posterior oropharyngeal erythema.   Nasal polyps visible bilaterally, nonobstructing, L>R   Eyes: Conjunctivae are normal. Right  eye exhibits no discharge. Left eye exhibits no discharge.   Neck: Normal range of motion.   Cardiovascular: Normal rate, regular rhythm and normal heart sounds.    No murmur heard.  Pulmonary/Chest: Effort normal. No respiratory distress. He has no decreased breath sounds. He has wheezes (end expiratory). He has no rhonchi. He has no rales.   Musculoskeletal: Normal range of motion.   Lymphadenopathy:     He has no cervical adenopathy.   Neurological: He is alert and oriented to person, place, and time.   Skin: Skin is warm. He is not diaphoretic.   Psychiatric: Mood, memory and affect normal.   Nursing note and vitals reviewed.            WORKUP:   SPIROMETRY  initial FVC 3.55L (77% of predicted); after bronchodilator 4.08L (+14% change)   initial FEV1 2.15L (59% of predicted); after bronchodilator 2.92L (+35% change)  initial FEV1/FVC 61 %; after bronchodilator 72%  initial FEF 25%-75% 1.19L/s (36% of predicted); after bronchodilator 2.69L/s (+125% change)    The office spirometry performed today suggests moderate obstruction.  Significant reversibility after nebulized albuterol noted.  Wheezing resolved after one albuterol treatment.      Asthma Control Test (ACT) total score: 21      ASSESSMENT/PLAN:    Problem List Items Addressed This Visit        Respiratory    1. Samter's triad  AKA aspirin exacerbated respiratory disease.  Manifested by chronic sinusitis with nasal polyposis, asthma, and aspirin sensitivity.  The management of these pathology involves treatment of patient's asthma and chronic rhinosinusitis.  In addition, this patient's definitely benefit from the suppression of abnormal leukotriene metabolism.  In certain cases, aspirin desensitization is helpful.  While aspirin desensitization is the main reason why the patient was sent to me, it is vital to optimize asthma control before the procedure. I will co-manage with Dr. Ji chronic rhinosinusits with polyps.                   Other Visit  Diagnoses     2. Adverse effect of drug, initial encounter    -  Primary  Currently, considering his FEV1, he is not a candidate for desensitization.  Will need to optimize asthma symptom control before desensitization/challenge.  -Continue strict avoidance of all NSAIDs.  -In the future, if FEV1 is above 70%, will prepare for desensitization.  Daily aspirin therapy can reduce upper and lower airway symptoms, although the effects of rhinosinusitis symptoms are typically more dramatic than the effects on asthma.  Target dose would be 650 mg twice daily.  Later, we could discuss about decreasing the dose and observe for the relapse of symptoms.    Relevant Medications    fluticasone-vilanterol (BREO ELLIPTA) 100-25 MCG/INH inhaler    montelukast (SINGULAIR) 10 MG tablet    3. Nasal polyposis      Discussed the importance of using intranasal corticosteroids regularly.  -Start Nasacort 2 sprays in each nostril twice daily.  Surgery to be considered before desensitization.    Relevant Medications            4. Moderate persistent asthma without complication      Currently not well controlled.  FEV1 with moderate obstruction and significant reversibility after albuterol.  The patient admits that he is using the inhaler only when he is very tight and not every time when he has chest symptoms which are more common.  -Start Breo Ellipta 100/25 mcg 1 puff once daily.  -Start montelukast 10 mg by mouth once daily.  Leukotriene modifying agents are essential in the management of all patients with AERD.  Discussed using albuterol inhaler as needed not only when his symptoms are very bad.  He can use it 2-4 puffs every 4 hours as needed for persistent cough, chest tightness, shortness of breath, or wheezing.    Relevant Medications    fluticasone-vilanterol (BREO ELLIPTA) 100-25 MCG/INH inhaler    montelukast (SINGULAIR) 10 MG tablet    Other Relevant Orders    ALBUTEROL UNIT DOSE, 1 MG (Completed)    BRONCHODILATION RESPONSE,  PRE/POST ADMIN (Completed)        5. Other chronic rhinitis      Currently not well controlled.  Complicated by nasal polyposis and possibly allergic component.  The patient states that he does not have time for skin test today.  I anticipate to perform it in the future.  As mentioned before, use Nasacort 2 sprays in each nostril twice daily.  Depending on future symptoms control, consider an intranasal antihistamine.     I spent 60 minutes for this visit, at least 35 minutes face-to-face counseling the patient about diagnosis of symptoms triad and management of each component.    Return in about 6 weeks (around 10/26/2018), or if symptoms worsen or fail to improve.    Thank you for allowing us to participate in the care of this patient. Please feel free to contact us if there are any questions or concerns about the patient.    Disclaimer: This note consists of symbols derived from keyboarding, dictation and/or voice recognition software. As a result, there may be errors in the script that have gone undetected. Please consider this when interpreting information found in this chart.    Sudarshan Pierson MD, FACI  Allergy, Asthma and Immunology  [place], [place] and [facility]all[facility]

## 2018-09-14 NOTE — LETTER
9/14/2018         RE: Marco Stovall  [address] [facility] [address]        Dear Colleague,    Thank you for referring your patient, Marco Stovall, to the [facility]. Please see a copy of my visit note below.    SUBJECTIVE:                                                               Marco Stovall presents today to our Allergy Clinic at [facility]; he is being seen in consultation at the request of Dr. Ji.    As you know, he is a 52-year-old male with chronic rhinitis/sinusitis with nasal polyposis, asthma, and aspirin sensitivity, consistent with aspirin-exacerbated respiratory disease.  He had undergone multiple sinus surgeries by Dr. Yang, most recently in 2014 when he underwent bilateral total ethmoidectomy, bilateral maxillary antrostomy, and bilateral frontal sinusotomy.   Recently completed a course of oral steroids for polyps.    Polyps started when he was about 26 years. Was getting polypectomy every 4-5 years.  About 10 years ago, he began to have chronic chest symptoms  (cough, wheeze, SOB and chest tightness), triggered by viral infections, NSAIDs, cold weather exposure, exertion, allergens, and irritants.  Chest symptoms are acutely improved by albuterol use (10 mins). Years ago, he was on some daily inhalers, but he never felt an improvement. He admits he wasn't consistent with them.  On average, he uses albuterol 2-3 times a week. He admits he is putting up with wheezing and uses albuterol only when he feels really tight. He has wheezing frequently, more than 3 times a weeks. No night awakenings due to chest symptoms.  He never takes prednisone for an asthma flare. He has not needed urgent visits with his PCP, an ER/UC, or other healthcare providers in the past year for asthma acting up. He has never been hospitalized for asthma. He has never had CXR-confirmed pneumonia.  20 years ago, he took Excedrin for headache. Within 30-60 min, he  developed chest tightness. He also vomited once. He also had rhinoconjunctivitis exacerbation. Took Benadryl and was fine within a few hours. No urticaria, angioedema, and diarrhea. He had 3 similar episodes. Latest episode happened about 5 years ago with Advil.  He tolerates Tylenol without issues.    About 20 years ago, he began to have perennial but seasonally-exacerbated (Fall) chronic nasal symptoms (itch, clear rhinorrhea, stuffiness, and sneezing), postnasal drip and ocular symptoms (itching and watering).  He is not worse around dogs/cats.  He tried Flonase, Nasacort, and Rhinocort. He uses Nasacort on as needed basis. He can't say if it's helpful.   Oral antihistamines (cetirizine/loratadine/fexofenadine/diphenhydramine) have been used, and they were not effective.   There is no history of PE tubes or T/A.  Patient Active Problem List   Diagnosis     Screening for hypertension     Chronic maxillary sinusitis     Hyperlipidemia LDL goal <130     Mild intermittent asthma without complication     Nasal polyp     Genital herpes simplex, unspecified site     Samter's triad       Past Medical History:   Diagnosis Date     Mild intermittent asthma without complication 3/24/2016     Polyp of nasal cavity       Problem (# of Occurrences) Relation (Name,Age of Onset)    Diabetes (1) Maternal Grandfather    Hypertension (2) Mother, Father    Thyroid Disease (2) Mother: hypothyroidism, Daughter: hypothyroidism       Negative family history of: Asthma, C.A.D., Cerebrovascular Disease, Breast Cancer, Cancer - colorectal, Prostate Cancer        Past Surgical History:   Procedure Laterality Date     ENT SURGERY       ETHMOIDECTOMY  6/16/2014    Procedure: ETHMOIDECTOMY;  Surgeon: Jimbo Yang MD;  Location: [facility]     SURGICAL HISTORY OF -   2010    Nasal Polyp; 3 total surgeries: 1997     Social History     Social History     Marital status:      Spouse name: N/A     Number of children: N/A     Years of  education: N/A     Social History Main Topics     Smoking status: Never Smoker     Smokeless tobacco: Never Used     Alcohol use Yes      Comment: wine or mixed 2 a month or more.     Drug use: No     Sexual activity: Yes     Partners: Female     Other Topics Concern     Parent/Sibling W/ Cabg, Mi Or Angioplasty Before 65f 55m? No      Service No     Blood Transfusions No     Caffeine Concern Yes     2 cups a day     Occupational Exposure No     Hobby Hazards Yes     motorcycle     Sleep Concern No     Stress Concern No     Weight Concern No     Special Diet Yes     multi vit     Back Care No     Exercise No     Bike Helmet Yes     Seat Belt Yes     Social History Narrative    September 14, 2018    ENVIRONMENTAL HISTORY: [place] The home is heated with a forced air. They do have central air conditioning. The patient's bedroom is furnished with carpeting in bedroom and allergen mattress cover.  Pets inside the house include 1 cat(s). There is no history of cockroach or mice infestation. There are no smokers in the house.  The house does not have a damp basement.                Review of Systems   Constitutional: Positive for fatigue. Negative for activity change and fever.   HENT: Positive for congestion, postnasal drip, rhinorrhea, sinus pressure and sneezing. Negative for dental problem, ear pain, facial swelling and nosebleeds.    Eyes: Positive for discharge. Negative for redness and itching.   Respiratory: Positive for chest tightness and wheezing. Negative for cough and shortness of breath.    Cardiovascular: Negative for chest pain.   Gastrointestinal: Negative for diarrhea, nausea and vomiting.   Musculoskeletal: Negative for arthralgias, joint swelling and myalgias.   Skin: Negative for rash.   Neurological: Negative for headaches.   Hematological: Negative for adenopathy.   Psychiatric/Behavioral: Negative for behavioral problems and self-injury.            Current Outpatient Prescriptions:      albuterol (PROAIR HFA/PROVENTIL HFA/VENTOLIN HFA) 108 (90 Base) MCG/ACT inhaler, Inhale 2 puffs into the lungs every 4 hours as needed for shortness of breath / dyspnea, Disp: 1 Inhaler, Rfl: 11     fluticasone-vilanterol (BREO ELLIPTA) 100-25 MCG/INH inhaler, Inhale 1 puff into the lungs daily, Disp: 1 Inhaler, Rfl: 3     montelukast (SINGULAIR) 10 MG tablet, Take 1 tablet (10 mg) by mouth At Bedtime, Disp: 30 tablet, Rfl: 3     Multiple Vitamin (MULTIVITAMIN) per tablet, Take 1 tablet by mouth daily., Disp: 100 tablet, Rfl: 12     valACYclovir (VALTREX) 500 MG tablet, Take 2 tablets (1,000 mg) by mouth 2 times daily for 10 days, Disp: 40 tablet, Rfl: 5     sildenafil (REVATIO) 20 MG tablet, Take 1 tablet (20 mg) by mouth three times daily for pulmonary hypertension. (Patient not taking: Reported on 9/14/2018), Disp: 30 tablet, Rfl: 11  Immunization History   Administered Date(s) Administered     Influenza Vaccine IM 3yrs+ 4 Valent IIV4 10/25/2013, 11/02/2017     Pneumococcal 23 valent 10/25/2013     TDAP Vaccine (Adacel) 10/11/2010     Allergies   Allergen Reactions     Advil [Ibuprofen Micronized] Other (See Comments)     Tight chest     Asa [Aspirin] Nausea and Vomiting     Asthma - tight chest     Naprosyn [Naproxen] GI Disturbance     Stomach      OBJECTIVE:                                                                 /80 (BP Location: Left arm, Patient Position: Sitting, Cuff Size: Adult Regular)  Pulse 50  Temp 96.7  F (35.9  C) (Oral)  Resp 16  Wt 81.4 kg (179 lb 7.3 oz)  SpO2 98%  BMI 27.29 kg/m2        Physical Exam   Constitutional: He is oriented to person, place, and time. No distress.   HENT:   Head: Normocephalic and atraumatic.   Right Ear: Tympanic membrane, external ear and ear canal normal.   Left Ear: Tympanic membrane, external ear and ear canal normal.   Nose: Mucosal edema (mild) present. No rhinorrhea.   Mouth/Throat: Oropharynx  is clear and moist and mucous membranes are normal. No oropharyngeal exudate, posterior oropharyngeal edema or posterior oropharyngeal erythema.   Nasal polyps visible bilaterally, nonobstructing, L>R   Eyes: Conjunctivae are normal. Right eye exhibits no discharge. Left eye exhibits no discharge.   Neck: Normal range of motion.   Cardiovascular: Normal rate, regular rhythm and normal heart sounds.    No murmur heard.  Pulmonary/Chest: Effort normal. No respiratory distress. He has no decreased breath sounds. He has wheezes (end expiratory). He has no rhonchi. He has no rales.   Musculoskeletal: Normal range of motion.   Lymphadenopathy:     He has no cervical adenopathy.   Neurological: He is alert and oriented to person, place, and time.   Skin: Skin is warm. He is not diaphoretic.   Psychiatric: Mood, memory and affect normal.   Nursing note and vitals reviewed.            WORKUP:   SPIROMETRY  initial FVC 3.55L (77% of predicted); after bronchodilator 4.08L (+14% change)   initial FEV1 2.15L (59% of predicted); after bronchodilator 2.92L (+35% change)  initial FEV1/FVC 61 %; after bronchodilator 72%  initial FEF 25%-75% 1.19L/s (36% of predicted); after bronchodilator 2.69L/s (+125% change)    The office spirometry performed today suggests moderate obstruction.  Significant reversibility after nebulized albuterol noted.  Wheezing resolved after one albuterol treatment.      Asthma Control Test (ACT) total score: 21      ASSESSMENT/PLAN:    Problem List Items Addressed This Visit        Respiratory    1. Samter's triad  AKA aspirin exacerbated respiratory disease.  Manifested by chronic sinusitis with nasal polyposis, asthma, and aspirin sensitivity.  The management of these pathology involves treatment of patient's asthma and chronic rhinosinusitis.  In addition, this patient's definitely benefit from the suppression of abnormal leukotriene metabolism.  In certain cases, aspirin desensitization is  helpful.  While aspirin desensitization is the main reason why the patient was sent to me, it is vital to optimize asthma control before the procedure. I will co-manage with Dr. Ji chronic rhinosinusits with polyps.                   Other Visit Diagnoses     2. Adverse effect of drug, initial encounter    -  Primary  Currently, considering his FEV1, he is not a candidate for desensitization.  Will need to optimize asthma symptom control before desensitization/challenge.  -Continue strict avoidance of all NSAIDs.  -In the future, if FEV1 is above 70%, will prepare for desensitization.  Daily aspirin therapy can reduce upper and lower airway symptoms, although the effects of rhinosinusitis symptoms are typically more dramatic than the effects on asthma.  Target dose would be 650 mg twice daily.  Later, we could discuss about decreasing the dose and observe for the relapse of symptoms.    Relevant Medications    fluticasone-vilanterol (BREO ELLIPTA) 100-25 MCG/INH inhaler    montelukast (SINGULAIR) 10 MG tablet    3. Nasal polyposis      Discussed the importance of using intranasal corticosteroids regularly.  -Start Nasacort 2 sprays in each nostril twice daily.  Surgery to be considered before desensitization.    Relevant Medications            4. Moderate persistent asthma without complication      Currently not well controlled.  FEV1 with moderate obstruction and significant reversibility after albuterol.  The patient admits that he is using the inhaler only when he is very tight and not every time when he has chest symptoms which are more common.  -Start Breo Ellipta 100/25 mcg 1 puff once daily.  -Start montelukast 10 mg by mouth once daily.  Leukotriene modifying agents are essential in the management of all patients with AERD.  Discussed using albuterol inhaler as needed not only when his symptoms are very bad.  He can use it 2-4 puffs every 4 hours as needed for persistent cough, chest tightness,  shortness of breath, or wheezing.    Relevant Medications    fluticasone-vilanterol (BREO ELLIPTA) 100-25 MCG/INH inhaler    montelukast (SINGULAIR) 10 MG tablet    Other Relevant Orders    ALBUTEROL UNIT DOSE, 1 MG (Completed)    BRONCHODILATION RESPONSE, PRE/POST ADMIN (Completed)        5. Other chronic rhinitis      Currently not well controlled.  Complicated by nasal polyposis and possibly allergic component.  The patient states that he does not have time for skin test today.  I anticipate to perform it in the future.  As mentioned before, use Nasacort 2 sprays in each nostril twice daily.  Depending on future symptoms control, consider an intranasal antihistamine.     I spent 60 minutes for this visit, at least 35 minutes face-to-face counseling the patient about diagnosis of symptoms triad and management of each component.    Return in about 6 weeks (around 10/26/2018), or if symptoms worsen or fail to improve.    Thank you for allowing us to participate in the care of this patient. Please feel free to contact us if there are any questions or concerns about the patient.    Disclaimer: This note consists of symbols derived from keyboarding, dictation and/or voice recognition software. As a result, there may be errors in the script that have gone undetected. Please consider this when interpreting information found in this chart.    Sudarshan Pierson MD, [facility]  Allergy, Asthma and Immunology  [place], [place] and [place]      Again, thank you for allowing me to participate in the care of your patient.        Sincerely,        Sudarshan Pierson MD

## 2018-09-14 NOTE — LETTER
My Asthma Action Plan  Name: Marco Stovall   YOB: 1965  Date: 9/14/2018   My doctor: Sudarshan Pierson MD   My clinic: [facility]        My Control Medicine: Fluticasone furoate + vilanterol (Breo Ellipta)-  100/25mcg 1 puff once daily  Montelukast (Singulair) -  10 mg by mouth once daily at bedtime  My Rescue Medicine: Albuterol (Proair/Ventolin/Proventil) inhaler 2-4 puffs every 4 hrs as needed   My Asthma Severity: moderate persistent  Avoid your asthma triggers: pollens, aspirin, strong odors and fumes, exercise or sports and cold air               GREEN ZONE   Good Control    I feel good    No cough or wheeze    Can work, sleep and play without asthma symptoms       Take your asthma control medicine every day.     1. If exercise triggers your asthma, take your rescue medication    15 minutes before exercise or sports, and    During exercise if you have asthma symptoms  2. Spacer to use with inhaler: If you have a spacer, make sure to use it with your inhaler             YELLOW ZONE Getting Worse  I have ANY of these:    I do not feel good    Cough or wheeze    Chest feels tight    Wake up at night   1. Keep taking your Green Zone medications  2. Start taking your rescue medicine:    every 20 minutes for up to 1 hour. Then every 4 hours for 24-48 hours.  3. If you stay in the Yellow Zone for more than 12-24 hours, contact your doctor.  4. If you do not return to the Green Zone in 12-24 hours or you get worse, start taking your oral steroid medicine if prescribed by your provider.           RED ZONE Medical Alert - Get Help  I have ANY of these:    I feel awful    Medicine is not helping    Breathing getting harder    Trouble walking or talking    Nose opens wide to breathe       1. Take your rescue medicine NOW  2. If your provider has prescribed an oral steroid medicine, start taking it NOW  3. Call your doctor NOW  4. If you are still in the Red Zone after 20 minutes and you have  not reached your doctor:    Take your rescue medicine again and    Call 911 or go to the emergency room right away    See your regular doctor within 2 weeks of an Emergency Room or Urgent Care visit for follow-up treatment.          Annual Reminders:  Meet with Asthma Educator,  Flu Shot in the Fall, consider Pneumonia Vaccination for patients with asthma (aged 19 and older).    [place]arm[place] [facility] [place] [address]                      Asthma Triggers  How To Control Things That Make Your Asthma Worse    Triggers are things that make your asthma worse.  Look at the list below to help you find your triggers and what you can do about them.  You can help prevent asthma flare-ups by staying away from your triggers.      Trigger                                                          What you can do   Cigarette Smoke  Tobacco smoke can make asthma worse. Do not allow smoking in your home, car or around you.  Be sure no one smokes at a child s day care or school.  If you smoke, ask your health care provider for ways to help you quit.  Ask family members to quit too.  Ask your health care provider for a referral to Quit Plan to help you quit smoking, or call 5-175-999-PLAN.     Colds, Flu, Bronchitis  These are common triggers of asthma. Wash your hands often.  Don t touch your eyes, nose or mouth.  Get a flu shot every year.     Dust Mites  These are tiny bugs that live in cloth or carpet. They are too small to see. Wash sheets and blankets in hot water every week.   Encase pillows and mattress in dust mite proof covers.  Avoid having carpet if you can. If you have carpet, vacuum weekly.   Use a dust mask and HEPA vacuum.   Pollen and Outdoor Mold  Some people are allergic to trees, grass, or weed pollen, or molds. Try to keep your windows closed.  Limit time out doors when pollen count is high.   Ask you health care provider about taking medicine during allergy season.     Animal  Dander  Some people are allergic to skin flakes, urine or saliva from pets with fur or feathers. Keep pets with fur or feathers out of your home.    If you can t keep the pet outdoors, then keep the pet out of your bedroom.  Keep the bedroom door closed.  Keep pets off cloth furniture and away from stuffed toys.     Mice, Rats, and Cockroaches  Some people are allergic to the waste from these pests.   Cover food and garbage.  Clean up spills and food crumbs.  Store grease in the refrigerator.   Keep food out of the bedroom.   Indoor Mold  This can be a trigger if your home has high moisture. Fix leaking faucets, pipes, or other sources of water.   Clean moldy surfaces.  Dehumidify basement if it is damp and smelly.   Smoke, Strong Odors, and Sprays  These can reduce air quality. Stay away from strong odors and sprays, such as perfume, powder, hair spray, paints, smoke incense, paint, cleaning products, candles and new carpet.   Exercise or Sports  Some people with asthma have this trigger. Be active!  Ask your doctor about taking medicine before sports or exercise to prevent symptoms.    Warm up for 5-10 minutes before and after sports or exercise.     Other Triggers of Asthma  Cold air:  Cover your nose and mouth with a scarf.  Sometimes laughing or crying can be a trigger.  Some medicines and food can trigger asthma.

## 2018-09-14 NOTE — NURSING NOTE
The following nebulizer treatment was given:     MEDICATION: Albuterol Sulfate 2.5 mg  : Slack  LOT #: [id number]  EXPIRATION DATE:  Dec 2019  NDC # 3914-9450-45     Nebulizer Start Time:  7:52 am  Nebulizer Stop Time:  7:59 am  See Vital Signs Flowsheet

## 2018-09-15 ASSESSMENT — ASTHMA QUESTIONNAIRES: ACT_TOTALSCORE: 21

## 2018-10-26 ENCOUNTER — OFFICE VISIT (OUTPATIENT)
Dept: ALLERGY | Facility: CLINIC | Age: 53
End: 2018-10-26
Payer: COMMERCIAL

## 2018-10-26 VITALS
HEART RATE: 54 BPM | SYSTOLIC BLOOD PRESSURE: 110 MMHG | OXYGEN SATURATION: 99 % | RESPIRATION RATE: 16 BRPM | BODY MASS INDEX: 27.4 KG/M2 | DIASTOLIC BLOOD PRESSURE: 72 MMHG | WEIGHT: 180.2 LBS | TEMPERATURE: 96.7 F

## 2018-10-26 DIAGNOSIS — Z88.6 ASPIRIN SENSITIVITY: ICD-10-CM

## 2018-10-26 DIAGNOSIS — J30.1 SEASONAL ALLERGIC RHINITIS DUE TO POLLEN: ICD-10-CM

## 2018-10-26 DIAGNOSIS — Z23 NEED FOR PROPHYLACTIC VACCINATION AND INOCULATION AGAINST INFLUENZA: ICD-10-CM

## 2018-10-26 DIAGNOSIS — T78.1XXA REACTION TO FOOD, INITIAL ENCOUNTER: ICD-10-CM

## 2018-10-26 DIAGNOSIS — J33.9 NASAL POLYP: ICD-10-CM

## 2018-10-26 DIAGNOSIS — J45.50 SEVERE PERSISTENT ASTHMA WITHOUT COMPLICATION (H): Primary | ICD-10-CM

## 2018-10-26 DIAGNOSIS — J30.89 ALLERGIC RHINITIS DUE TO DUST MITE: ICD-10-CM

## 2018-10-26 PROBLEM — J45.909 SAMTER'S TRIAD: Chronic | Status: ACTIVE | Noted: 2018-09-14

## 2018-10-26 LAB
BASOPHILS # BLD AUTO: 0.1 10E9/L (ref 0–0.2)
BASOPHILS NFR BLD AUTO: 1 %
DIFFERENTIAL METHOD BLD: NORMAL
EOSINOPHIL # BLD AUTO: 0.6 10E9/L (ref 0–0.7)
EOSINOPHIL NFR BLD AUTO: 9.1 %
ERYTHROCYTE [DISTWIDTH] IN BLOOD BY AUTOMATED COUNT: 12.8 % (ref 10–15)
FEF 25/75: NORMAL
FEV-1: NORMAL
FEV1/FVC: NORMAL
FVC: NORMAL
HCT VFR BLD AUTO: 45.7 % (ref 40–53)
HGB BLD-MCNC: 15.5 G/DL (ref 13.3–17.7)
LYMPHOCYTES # BLD AUTO: 2.7 10E9/L (ref 0.8–5.3)
LYMPHOCYTES NFR BLD AUTO: 38.7 %
MCH RBC QN AUTO: 31.3 PG (ref 26.5–33)
MCHC RBC AUTO-ENTMCNC: 33.9 G/DL (ref 31.5–36.5)
MCV RBC AUTO: 92 FL (ref 78–100)
MONOCYTES # BLD AUTO: 0.6 10E9/L (ref 0–1.3)
MONOCYTES NFR BLD AUTO: 7.9 %
NEUTROPHILS # BLD AUTO: 3 10E9/L (ref 1.6–8.3)
NEUTROPHILS NFR BLD AUTO: 43.3 %
PLATELET # BLD AUTO: 176 10E9/L (ref 150–450)
RBC # BLD AUTO: 4.95 10E12/L (ref 4.4–5.9)
WBC # BLD AUTO: 7 10E9/L (ref 4–11)

## 2018-10-26 PROCEDURE — 99214 OFFICE O/P EST MOD 30 MIN: CPT | Mod: 25 | Performed by: ALLERGY & IMMUNOLOGY

## 2018-10-26 PROCEDURE — 85025 COMPLETE CBC W/AUTO DIFF WBC: CPT | Performed by: ALLERGY & IMMUNOLOGY

## 2018-10-26 PROCEDURE — 95004 PERQ TESTS W/ALRGNC XTRCS: CPT | Performed by: ALLERGY & IMMUNOLOGY

## 2018-10-26 PROCEDURE — 99000 SPECIMEN HANDLING OFFICE-LAB: CPT | Performed by: ALLERGY & IMMUNOLOGY

## 2018-10-26 PROCEDURE — 82785 ASSAY OF IGE: CPT | Performed by: ALLERGY & IMMUNOLOGY

## 2018-10-26 PROCEDURE — 90471 IMMUNIZATION ADMIN: CPT | Performed by: ALLERGY & IMMUNOLOGY

## 2018-10-26 PROCEDURE — 86003 ALLG SPEC IGE CRUDE XTRC EA: CPT | Mod: 59 | Performed by: ALLERGY & IMMUNOLOGY

## 2018-10-26 PROCEDURE — 86008 ALLG SPEC IGE RECOMB EA: CPT | Performed by: ALLERGY & IMMUNOLOGY

## 2018-10-26 PROCEDURE — 94010 BREATHING CAPACITY TEST: CPT | Performed by: ALLERGY & IMMUNOLOGY

## 2018-10-26 PROCEDURE — 36415 COLL VENOUS BLD VENIPUNCTURE: CPT | Performed by: ALLERGY & IMMUNOLOGY

## 2018-10-26 PROCEDURE — 90686 IIV4 VACC NO PRSV 0.5 ML IM: CPT | Performed by: ALLERGY & IMMUNOLOGY

## 2018-10-26 RX ORDER — EPINEPHRINE 0.3 MG/.3ML
0.3 INJECTION SUBCUTANEOUS PRN
Qty: 0.6 ML | Refills: 2 | Status: SHIPPED | OUTPATIENT
Start: 2018-10-26 | End: 2019-11-18

## 2018-10-26 RX ORDER — AZELASTINE 1 MG/ML
2 SPRAY, METERED NASAL 2 TIMES DAILY PRN
Qty: 30 ML | Refills: 3 | Status: SHIPPED | OUTPATIENT
Start: 2018-10-26 | End: 2019-12-10

## 2018-10-26 ASSESSMENT — ENCOUNTER SYMPTOMS
ARTHRALGIAS: 0
CHEST TIGHTNESS: 0
JOINT SWELLING: 0
FATIGUE: 0
COUGH: 0
FEVER: 0
EYE REDNESS: 1
NAUSEA: 1
FACIAL SWELLING: 0
VOMITING: 0
HEADACHES: 0
MYALGIAS: 0
SINUS PRESSURE: 0
EYE DISCHARGE: 1
RHINORRHEA: 1
DIARRHEA: 0
ADENOPATHY: 0
WHEEZING: 0
SHORTNESS OF BREATH: 0
ACTIVITY CHANGE: 0
EYE ITCHING: 1

## 2018-10-26 NOTE — PROGRESS NOTES
SUBJECTIVE:                                                               Marco Stovall presents today to our Allergy Clinic at [facility] for a follow up visit.  As you know, he is a 53-year-old male with chronic rhinitis/sinusitis with nasal polyposis, asthma, and aspirin sensitivity, consistent with the aspirin-exacerbated respiratory disease.  Regarding his asthma, he has been  3/7 days compliant with his controller medications (Breo Ellipta 100/25 mcg 1 puff once daily). While he is better, he feels that he has symptoms daily, sometimes several times a day.  He started using albuterol for all these symptoms. He denies night awakenings.  There has been no use of oral steroids since the last visit. No ED/PCP/urgent care/other specialist visits for asthma flare since the last visit. The patient denies current chest tightness, cough, wheeze or SOB.  Asthma triggers remain unchanged.     He uses Nasacort twice daily. He takes montelukast 10 mg by mouth once daily. He feels that he is doing better on this regimen, but he is not entirely controlled. He continues complaining of nasal congestion, rhinorrhea, and postnasal drip.    He thinks that if he eats hazelnuts, he gets tight lungs within 30 minutes. He takes albuterol for that.   He has no issues with peanuts or other tree nuts.    Recently, he went to a restaurant and ate a burger, and though he had similar symptoms. He has no issues eating beef, or wheat otherwise. He denies having any asthma-related symptoms if he drinks alcohol (something that AERD patients may complain).    Patient Active Problem List   Diagnosis     Screening for hypertension     Chronic maxillary sinusitis     Hyperlipidemia LDL goal <130     Severe persistent asthma without complication     Nasal polyp     Genital herpes simplex, unspecified site     Samter's triad     Aspirin sensitivity       Past Medical History:   Diagnosis Date     Mild intermittent asthma  without complication 3/24/2016     Polyp of nasal cavity       Problem (# of Occurrences) Relation (Name,Age of Onset)    Diabetes (1) Maternal Grandfather    Hypertension (2) Mother, Father    Thyroid Disease (2) Mother: hypothyroidism, Daughter: hypothyroidism       Negative family history of: Asthma, C.A.D., Cerebrovascular Disease, Breast Cancer, Cancer - colorectal, Prostate Cancer        Past Surgical History:   Procedure Laterality Date     ENT SURGERY       ETHMOIDECTOMY  6/16/2014    Procedure: ETHMOIDECTOMY;  Surgeon: Jimbo Yang MD;  Location: [facility] OR     SURGICAL HISTORY OF -   2010    Nasal Polyp; 3 total surgeries: 1997     Social History     Social History     Marital status:      Spouse name: N/A     Number of children: N/A     Years of education: N/A     Social History Main Topics     Smoking status: Never Smoker     Smokeless tobacco: Never Used     Alcohol use Yes      Comment: wine or mixed 2 a month or more.     Drug use: No     Sexual activity: Yes     Partners: Female     Other Topics Concern     Parent/Sibling W/ Cabg, Mi Or Angioplasty Before 65f 55m? No      Service No     Blood Transfusions No     Caffeine Concern Yes     2 cups a day     Occupational Exposure No     Hobby Hazards Yes     motorcycle     Sleep Concern No     Stress Concern No     Weight Concern No     Special Diet Yes     multi vit     Back Care No     Exercise No     Bike Helmet Yes     Seat Belt Yes     Social History Narrative    September 14, 2018    ENVIRONMENTAL HISTORY: [place] The home is heated with a forced air. They do have central air conditioning. The patient's bedroom is furnished with carpeting in bedroom and allergen mattress cover.  Pets inside the house include 1 cat. There is no history of cockroach or mice infestation. There are no smokers in the house.  The house does not have a damp basement.                Review of Systems    Constitutional: Negative for activity change, fatigue and fever.   HENT: Positive for congestion, postnasal drip and rhinorrhea. Negative for dental problem, ear pain, facial swelling, nosebleeds, sinus pressure and sneezing.    Eyes: Positive for discharge, redness and itching.   Respiratory: Negative for cough, chest tightness, shortness of breath and wheezing.    Cardiovascular: Negative for chest pain.   Gastrointestinal: Positive for nausea. Negative for diarrhea and vomiting.   Musculoskeletal: Negative for arthralgias, joint swelling and myalgias.   Skin: Negative for rash.   Neurological: Negative for headaches.   Hematological: Negative for adenopathy.   Psychiatric/Behavioral: Negative for behavioral problems and self-injury.         Current Outpatient Prescriptions:      azelastine (ASTELIN) 0.1 % nasal spray, Spray 2 sprays into both nostrils 2 times daily as needed for rhinitis, Disp: 30 mL, Rfl: 3     EPINEPHrine (AUVI-Q) 0.3 MG/0.3ML injection 2-pack, Inject 0.3 mLs (0.3 mg) into the muscle as needed for anaphylaxis, Disp: 0.6 mL, Rfl: 2     fluticasone-vilanterol (BREO ELLIPTA) 200-25 MCG/INH inhaler, Inhale 1 puff into the lungs daily, Disp: 1 Inhaler, Rfl: 3     montelukast (SINGULAIR) 10 MG tablet, Take 1 tablet (10 mg) by mouth At Bedtime, Disp: 30 tablet, Rfl: 3     Multiple Vitamin (MULTIVITAMIN) per tablet, Take 1 tablet by mouth daily., Disp: 100 tablet, Rfl: 12     sildenafil (REVATIO) 20 MG tablet, Take 1 tablet (20 mg) by mouth three times daily for pulmonary hypertension., Disp: 30 tablet, Rfl: 11     valACYclovir (VALTREX) 500 MG tablet, Take 2 tablets (1,000 mg) by mouth 2 times daily for 10 days, Disp: 40 tablet, Rfl: 5     albuterol (PROAIR HFA/PROVENTIL HFA/VENTOLIN HFA) 108 (90 Base) MCG/ACT inhaler, Inhale 2 puffs into the lungs every 4 hours as needed for shortness of breath / dyspnea (Patient not taking: Reported on 10/26/2018), Disp: 1 Inhaler, Rfl: 11  Immunization History    Administered Date(s) Administered     Influenza Vaccine IM 3yrs+ 4 Valent IIV4 10/25/2013, 11/02/2017, 10/26/2018     Pneumococcal 23 valent 10/25/2013     TDAP Vaccine (Adacel) 10/11/2010     Allergies   Allergen Reactions     Advil [Ibuprofen Micronized] Other (See Comments)     Tight chest     Asa [Aspirin] Nausea and Vomiting     Asthma - tight chest     Hazelnuts [Nuts] Other (See Comments)     Chest tight     Naprosyn [Naproxen] GI Disturbance     Stomach      OBJECTIVE:                                                                 /72 (BP Location: Left arm, Patient Position: Sitting, Cuff Size: Adult Regular)  Pulse 54  Temp 96.7  F (35.9  C) (Oral)  Resp 16  Wt 81.7 kg (180 lb 3.2 oz)  SpO2 99%  BMI 27.4 kg/m2        Physical Exam   Constitutional: He is oriented to person, place, and time. No distress.   HENT:   Head: Normocephalic and atraumatic.   Right Ear: Tympanic membrane, external ear and ear canal normal.   Left Ear: Tympanic membrane, external ear and ear canal normal.   Nose: Mucosal edema (mild) present. No rhinorrhea.   Mouth/Throat: Oropharynx is clear and moist and mucous membranes are normal. No oropharyngeal exudate, posterior oropharyngeal edema or posterior oropharyngeal erythema.   Nasal polyps visible bilaterally, nonobstructing, L>R   Eyes: Conjunctivae are normal. Right eye exhibits no discharge. Left eye exhibits no discharge.   Neck: Normal range of motion.   Cardiovascular: Normal rate, regular rhythm and normal heart sounds.    No murmur heard.  Pulmonary/Chest: Effort normal. No respiratory distress. He has no decreased breath sounds. He has wheezes (end expiratory). He has no rhonchi. He has no rales.   Musculoskeletal: Normal range of motion.   Lymphadenopathy:     He has no cervical adenopathy.   Neurological: He is alert and oriented to person, place, and time.   Skin: Skin is warm. He is not diaphoretic.   Psychiatric: Affect normal.   Nursing note and vitals  reviewed.            WORKUP:     ENVIRONMENTAL PERCUTANEOUS SKIN TESTING: ADULT  [facility] 10/26/2018   Consent Y   Ordering Physician Kenna   Interpreting Physician Kenna   Testing Technician Ammy MOY CMA   Location Back   Time start:  8:01 AM   Time End:  8:16 AM   Positive Control: Histatrol*ALK 1 mg/ml 6/9   Negative Control: 50% Glycerin 0/0   Cat Hair*ALK (10,000 BAU/ml) 0/0   AP Dog Hair/Dander (1:100 w/v) 0/0   Dust Mite p. 30,000 AU/ml 5/10   Dust Mite f. (30,000 AU/ml) 0/0   Azeem (W/F in millimeters) 6/7   Grass Mix #7 (100,000 BAU/ml) 22/30   Red [place] (W/F in millimeters) 0/0   Maple/Hinsdale (W/F in millimeters) 0/0   Hackberry (W/F in millimeters) 3/4   Oldham (W/F in millimeters) 0/0   Story *ALK (W/F in millimeters) 0/0   American Elm (W/F in millimeters) 0/0   Belmont (W/F in millimeters) 0/0   Black Midway (W/F in millimeters) 0/0   Birch Mix (W/F in millimeters) 0/0   Blo[place] (W/F in millimeters) 0/0   Oak (W/F in millimeters) 6/10   Cocklebur (W/F in millimeters) 0/0   Basom (W/F in millimeters) 0/0   White Jose (W/F in millimeters) 0/0   Careless (W/F in millimeters) 0/0   Nettle (W/F in millimeters) 0/0   English Plantain (W/F in millimeters) 0/0   Kochia (W/F in millimeters) 0/0   Lamb's Quarter (W/F in millimeters) 0/0   Marshelder (W/F in millimeters) 00   Ragweed Mix* ALK (W/F in millimeters) 4/5   Russian Thistle (W/F in millimeters) 4/5   Sagebrush/Mugwort (W/F in millimeters) 0/0   Sheep Sorrel (W/F in millimeters) 0/0   Feather Mix* ALK (W/F in millimeters) 0/0   Penicillium Mix (1:10 w/v) 0/0   Curvularia spicifera (1:10 w/v) 0/0   Epicoccum (1:10 w/v) 0/0   Aspergillus fumigatus (1:10 w/v): 0/0   Alternaria tenius (1:10 w/v) 4/10   H. Cladosporium (1:10 w/v) 0/0   Phoma herbarum (1:10 w/v) 0/0      FOOD ALLERGEN PERCUTANEOUS SKIN TESTING  PhotoSynesi  10/26/2018   Hazelnut (Filbert)  1:20 (W/F in millimeters) 10/11          Percutaneous skin puncture  testing for aeroallergens performed today (October 26, 2018) showed sensitivity to dust mite (Dermatophagoides pteronyssinus), grass pollen (Azeem grass and grass mix #7) tree pollen (Hackberry, and oak), weed pollen( ragweed mix, Russian thistle) and Alternaria mold.    Percutaneous skin puncture testing for hazelnut was positive.      The patient had appropriate responses to positive and negative controls.      SPIROMETRY       FVC 3.69L (79% of predicted).     FEV1 2.20L (61% of predicted).     FEV1/FVC 60%     FEF 25%-75%  1.22L/s (38% of predicted)    The office spirometry performed today suggests moderate obstruction.      Asthma Control Test (ACT) total score: 20  ACT is not valid. The patient reports a different frequency of symptoms during history taking.       ASSESSMENT/PLAN:      Problem List Items Addressed This Visit        Respiratory    1. Severe persistent asthma without complication - Primary (Chronic)  The office spirometry performed today is unchanged.  Suboptimal symptom control.  The primary reason is improper adherence to the controller medications.  With current FEV1, he is not a good candidate for aspirin desensitization.  Ideally, we would need to make sure that he is FEV1 is above 70% to be able to perform aspirin desensitization as an outpatient.  --Stop Breo Ellipta 100/25 mcg.  --Start Breo Ellipta 200/25 mcg 1 puff once daily.  Encouraged daily use.  --Continue montelukast 10 mg by mouth once daily.  --Continue using albuterol inhaler 2-4 puffs every 4 hours as needed for persistent cough/chest tightness/wheezing/shortness of breath.  Depending on symptom control, he may qualify for biologics such as omalizumab, mepolizumab, benralizumab, or dupilumab.    Dupilumab would be of a great interest considering that there is a reassuring data not only on asthma control but polyp control as well.    Relevant Medications    fluticasone-vilanterol (BREO ELLIPTA) 200-25 MCG/INH inhaler         Other Relevant Orders    Spirometry, Breathing Capacity (Completed)    CBC with platelets differential (Completed)    IgE (Completed)       Other    2. Aspirin sensitivity    -Continue strict avoidance.    Aspirin desensitization once possible.          Other Visit Diagnoses     3. Reaction to food, initial encounter     Hazelnut allergy.  New diagnosis.  While the patient tolerates other tree nuts, he is worried about cross-contamination, and prefers to avoid all tree nuts from now on.  - Provided written information about food avoidance. Advised about the importance of reading labels, ordering safe foods in the restaurants and risk of cross-contamination.  - Instructed about the recognizing and treating allergic reactions.  - Instructed to carry epinephrine autoinjector 2-Byron and cetirizine all the time and use it accordingly in case of accidental exposure. Call 911 or see ER after use of epinephrine.  - Advised to visit [email or web address]    Relevant Medications        EPINEPHrine (AUVI-Q) 0.3 MG/0.3ML injection 2-pack        Other Relevant Orders    Allergen hazelnut IgE (Completed)    Hazelnut Component Allergy Panel (Completed)    ALLERGY SKIN TESTS,ALLERGENS (Completed)    4. Need for prophylactic vaccination and inoculation against influenza        Relevant Orders    FLU VACCINE, SPLIT VIRUS, IM (QUADRIVALENT) [55032]- >3 YRS (Completed)    Vaccine Administration, Initial [16834] (Completed)          5. Nasal polyp     -Continue Nasacort 2 sprays in each nostril twice daily.      6.  Seasonal allergic rhinitis due to pollen      Avoidance measures discussed and information provided.      -Continue Nasacort 2 sprays in each nostril twice daily.     -Start azelastine 2 sprays in each nostril twice daily as needed.      If symptoms persist despite medications and allergen avoidance, or if medications are not tolerated, allergen immunotherapy is recommended.      Discussed briefly about allergen immunotherapy  today.         7.  Allergic rhinitis due to dust mite      Return in about 6 weeks (around 12/7/2018), or if symptoms worsen or fail to improve.    Thank you for allowing us to participate in the care of this patient. Please feel free to contact us if there are any questions or concerns about the patient.    Disclaimer: This note consists of symbols derived from keyboarding, dictation and/or voice recognition software. As a result, there may be errors in the script that have gone undetected. Please consider this when interpreting information found in this chart.    Sudarshan Pierson MD, FACI  Allergy, Asthma and Immunology  [place], [place] and [facility]

## 2018-10-26 NOTE — LETTER
My Asthma Action Plan  Name: Marco Stovall   YOB: 1965  Date: 10/26/2018    My doctor: Sudarshan Pierson MD   My clinic: [facility]        My Control Medicine: Fluticasone furoate + vilanterol (Breo Ellipta)-  200/25mcg 1 puff once daily  Montelukast (Singulair) -  10 mg by mouth once daily at bedtime  My Rescue Medicine: Albuterol (Proair/Ventolin/Proventil) inhaler 2-4 puffs every 4 hrs as needed   My Asthma Severity: severe persistent  Avoid your asthma triggers: pollens, aspirin, strong odors and fumes, exercise or sports and cold air               GREEN ZONE   Good Control    I feel good    No cough or wheeze    Can work, sleep and play without asthma symptoms       Take your asthma control medicine every day.     1. If exercise triggers your asthma, take your rescue medication    15 minutes before exercise or sports, and    During exercise if you have asthma symptoms  2. Spacer to use with inhaler: If you have a spacer, make sure to use it with your inhaler             YELLOW ZONE Getting Worse  I have ANY of these:    I do not feel good    Cough or wheeze    Chest feels tight    Wake up at night   1. Keep taking your Green Zone medications  2. Start taking your rescue medicine:    every 20 minutes for up to 1 hour. Then every 4 hours for 24-48 hours.  3. If you stay in the Yellow Zone for more than 12-24 hours, contact your doctor.  4. If you do not return to the Green Zone in 12-24 hours or you get worse, start taking your oral steroid medicine if prescribed by your provider.           RED ZONE Medical Alert - Get Help  I have ANY of these:    I feel awful    Medicine is not helping    Breathing getting harder    Trouble walking or talking    Nose opens wide to breathe       1. Take your rescue medicine NOW  2. If your provider has prescribed an oral steroid medicine, start taking it NOW  3. Call your doctor NOW  4. If you are still in the Red Zone after 20 minutes and you have  not reached your doctor:    Take your rescue medicine again and    Call 911 or go to the emergency room right away    See your regular doctor within 2 weeks of an Emergency Room or Urgent Care visit for follow-up treatment.          Annual Reminders:  Meet with Asthma Educator,  Flu Shot in the Fall, consider Pneumonia Vaccination for patients with asthma (aged 19 and older).    Pharm[place]: [facility] [place] [address]                      Asthma Triggers  How To Control Things That Make Your Asthma Worse    Triggers are things that make your asthma worse.  Look at the list below to help you find your triggers and what you can do about them.  You can help prevent asthma flare-ups by staying away from your triggers.      Trigger                                                          What you can do   Cigarette Smoke  Tobacco smoke can make asthma worse. Do not allow smoking in your home, car or around you.  Be sure no one smokes at a child s day care or school.  If you smoke, ask your health care provider for ways to help you quit.  Ask family members to quit too.  Ask your health care provider for a referral to Quit Plan to help you quit smoking, or call 6-539-335-PLAN.     Colds, Flu, Bronchitis  These are common triggers of asthma. Wash your hands often.  Don t touch your eyes, nose or mouth.  Get a flu shot every year.     Dust Mites  These are tiny bugs that live in cloth or carpet. They are too small to see. Wash sheets and blankets in hot water every week.   Encase pillows and mattress in dust mite proof covers.  Avoid having carpet if you can. If you have carpet, vacuum weekly.   Use a dust mask and HEPA vacuum.   Pollen and Outdoor Mold  Some people are allergic to trees, grass, or weed pollen, or molds. Try to keep your windows closed.  Limit time out doors when pollen count is high.   Ask you health care provider about taking medicine during allergy season.     Animal  Dander  Some people are allergic to skin flakes, urine or saliva from pets with fur or feathers. Keep pets with fur or feathers out of your home.    If you can t keep the pet outdoors, then keep the pet out of your bedroom.  Keep the bedroom door closed.  Keep pets off cloth furniture and away from stuffed toys.     Mice, Rats, and Cockroaches  Some people are allergic to the waste from these pests.   Cover food and garbage.  Clean up spills and food crumbs.  Store grease in the refrigerator.   Keep food out of the bedroom.   Indoor Mold  This can be a trigger if your home has high moisture. Fix leaking faucets, pipes, or other sources of water.   Clean moldy surfaces.  Dehumidify basement if it is damp and smelly.   Smoke, Strong Odors, and Sprays  These can reduce air quality. Stay away from strong odors and sprays, such as perfume, powder, hair spray, paints, smoke incense, paint, cleaning products, candles and new carpet.   Exercise or Sports  Some people with asthma have this trigger. Be active!  Ask your doctor about taking medicine before sports or exercise to prevent symptoms.    Warm up for 5-10 minutes before and after sports or exercise.     Other Triggers of Asthma  Cold air:  Cover your nose and mouth with a scarf.  Sometimes laughing or crying can be a trigger.  Some medicines and food can trigger asthma.

## 2018-10-26 NOTE — NURSING NOTE
Per provider verbal order, CMA placed Adult Environmental Panel and Hazelnut percutaneous skin puncture tests. Consent was obtained prior to procedure. Once panels were placed, the patient was monitored for 15 minutes in clinic. RN read test after 15 minutes and provider was notified of results. The patient tolerated procedure well. All questions and concerns were addressed at office visit.     Ammy Kirby CMA, MARI

## 2018-10-26 NOTE — PROGRESS NOTES

## 2018-10-26 NOTE — LETTER
10/26/2018         RE: Marco Stovall  [address] [facility] [address]        Dear Colleague,    Thank you for referring your patient, Marco Stovall, to the [facility]. Please see a copy of my visit note below.    SUBJECTIVE:                                                               Marco Stovall presents today to our Allergy Clinic at [facility] for a follow up visit.  As you know, he is a 53-year-old male with chronic rhinitis/sinusitis with nasal polyposis, asthma, and aspirin sensitivity, consistent with the aspirin-exacerbated respiratory disease.  Regarding his asthma, he has been  3/7 days compliant with his controller medications (Breo Ellipta 100/25 mcg 1 puff once daily). While he is better, he feels that he has symptoms daily, sometimes several times a day.  He started using albuterol for all these symptoms. He denies night awakenings.  There has been no use of oral steroids since the last visit. No ED/PCP/urgent care/other specialist visits for asthma flare since the last visit. The patient denies current chest tightness, cough, wheeze or SOB.  Asthma triggers remain unchanged.     He uses Nasacort twice daily. He takes montelukast 10 mg by mouth once daily. He feels that he is doing better on this regimen, but he is not entirely controlled. He continues complaining of nasal congestion, rhinorrhea, and postnasal drip.    He thinks that if he eats hazelnuts, he gets tight lungs within 30 minutes. He takes albuterol for that.   He has no issues with peanuts or other tree nuts.    Recently, he went to a restaurant and ate a burger, and though he had similar symptoms. He has no issues eating beef, or wheat otherwise. He denies having any asthma-related symptoms if he drinks alcohol (something that AERD patients may complain).    Patient Active Problem List   Diagnosis     Screening for hypertension     Chronic maxillary sinusitis     Hyperlipidemia  LDL goal <130     Severe persistent asthma without complication     Nasal polyp     Genital herpes simplex, unspecified site     Samter's triad     Aspirin sensitivity       Past Medical History:   Diagnosis Date     Mild intermittent asthma without complication 3/24/2016     Polyp of nasal cavity       Problem (# of Occurrences) Relation (Name,Age of Onset)    Diabetes (1) Maternal Grandfather    Hypertension (2) Mother, Father    Thyroid Disease (2) Mother: hypothyroidism, Daughter: hypothyroidism       Negative family history of: Asthma, C.A.D., Cerebrovascular Disease, Breast Cancer, Cancer - colorectal, Prostate Cancer        Past Surgical History:   Procedure Laterality Date     ENT SURGERY       ETHMOIDECTOMY  6/16/2014    Procedure: ETHMOIDECTOMY;  Surgeon: Jimbo Yang MD;  Location: [facility] OR     SURGICAL HISTORY OF -   2010    Nasal Polyp; 3 total surgeries: 1997     Social History     Social History     Marital status:      Spouse name: N/A     Number of children: N/A     Years of education: N/A     Social History Main Topics     Smoking status: Never Smoker     Smokeless tobacco: Never Used     Alcohol use Yes      Comment: wine or mixed 2 a month or more.     Drug use: No     Sexual activity: Yes     Partners: Female     Other Topics Concern     Parent/Sibling W/ Cabg, Mi Or Angioplasty Before 65f 55m? No      Service No     Blood Transfusions No     Caffeine Concern Yes     2 cups a day     Occupational Exposure No     Hobby Hazards Yes     motorcycle     Sleep Concern No     Stress Concern No     Weight Concern No     Special Diet Yes     multi vit     Back Care No     Exercise No     Bike Helmet Yes     Seat Belt Yes     Social History Narrative    September 14, 2018    ENVIRONMENTAL HISTORY: [place] The home is heated with a forced air. They do have central air conditioning. The patient's bedroom is furnished with carpeting in  bedroom and allergen mattress cover.  Pets inside the house include 1 cat. There is no history of cockroach or mice infestation. There are no smokers in the house.  The house does not have a damp basement.                Review of Systems   Constitutional: Negative for activity change, fatigue and fever.   HENT: Positive for congestion, postnasal drip and rhinorrhea. Negative for dental problem, ear pain, facial swelling, nosebleeds, sinus pressure and sneezing.    Eyes: Positive for discharge, redness and itching.   Respiratory: Negative for cough, chest tightness, shortness of breath and wheezing.    Cardiovascular: Negative for chest pain.   Gastrointestinal: Positive for nausea. Negative for diarrhea and vomiting.   Musculoskeletal: Negative for arthralgias, joint swelling and myalgias.   Skin: Negative for rash.   Neurological: Negative for headaches.   Hematological: Negative for adenopathy.   Psychiatric/Behavioral: Negative for behavioral problems and self-injury.         Current Outpatient Prescriptions:      azelastine (ASTELIN) 0.1 % nasal spray, Spray 2 sprays into both nostrils 2 times daily as needed for rhinitis, Disp: 30 mL, Rfl: 3     EPINEPHrine (AUVI-Q) 0.3 MG/0.3ML injection 2-pack, Inject 0.3 mLs (0.3 mg) into the muscle as needed for anaphylaxis, Disp: 0.6 mL, Rfl: 2     fluticasone-vilanterol (BREO ELLIPTA) 200-25 MCG/INH inhaler, Inhale 1 puff into the lungs daily, Disp: 1 Inhaler, Rfl: 3     montelukast (SINGULAIR) 10 MG tablet, Take 1 tablet (10 mg) by mouth At Bedtime, Disp: 30 tablet, Rfl: 3     Multiple Vitamin (MULTIVITAMIN) per tablet, Take 1 tablet by mouth daily., Disp: 100 tablet, Rfl: 12     sildenafil (REVATIO) 20 MG tablet, Take 1 tablet (20 mg) by mouth three times daily for pulmonary hypertension., Disp: 30 tablet, Rfl: 11     valACYclovir (VALTREX) 500 MG tablet, Take 2 tablets (1,000 mg) by mouth 2 times daily for 10 days, Disp: 40 tablet, Rfl: 5     albuterol (PROAIR  HFA/PROVENTIL HFA/VENTOLIN HFA) 108 (90 Base) MCG/ACT inhaler, Inhale 2 puffs into the lungs every 4 hours as needed for shortness of breath / dyspnea (Patient not taking: Reported on 10/26/2018), Disp: 1 Inhaler, Rfl: 11  Immunization History   Administered Date(s) Administered     Influenza Vaccine IM 3yrs+ 4 Valent IIV4 10/25/2013, 11/02/2017, 10/26/2018     Pneumococcal 23 valent 10/25/2013     TDAP Vaccine (Adacel) 10/11/2010     Allergies   Allergen Reactions     Advil [Ibuprofen Micronized] Other (See Comments)     Tight chest     Asa [Aspirin] Nausea and Vomiting     Asthma - tight chest     Hazelnuts [Nuts] Other (See Comments)     Chest tight     Naprosyn [Naproxen] GI Disturbance     Stomach      OBJECTIVE:                                                                 /72 (BP Location: Left arm, Patient Position: Sitting, Cuff Size: Adult Regular)  Pulse 54  Temp 96.7  F (35.9  C) (Oral)  Resp 16  Wt 81.7 kg (180 lb 3.2 oz)  SpO2 99%  BMI 27.4 kg/m2        Physical Exam   Constitutional: He is oriented to person, place, and time. No distress.   HENT:   Head: Normocephalic and atraumatic.   Right Ear: Tympanic membrane, external ear and ear canal normal.   Left Ear: Tympanic membrane, external ear and ear canal normal.   Nose: Mucosal edema (mild) present. No rhinorrhea.   Mouth/Throat: Oropharynx is clear and moist and mucous membranes are normal. No oropharyngeal exudate, posterior oropharyngeal edema or posterior oropharyngeal erythema.   Nasal polyps visible bilaterally, nonobstructing, L>R   Eyes: Conjunctivae are normal. Right eye exhibits no discharge. Left eye exhibits no discharge.   Neck: Normal range of motion.   Cardiovascular: Normal rate, regular rhythm and normal heart sounds.    No murmur heard.  Pulmonary/Chest: Effort normal. No respiratory distress. He has no decreased breath sounds. He has wheezes (end expiratory). He has no rhonchi. He has no rales.   Musculoskeletal:  Normal range of motion.   Lymphadenopathy:     He has no cervical adenopathy.   Neurological: He is alert and oriented to person, place, and time.   Skin: Skin is warm. He is not diaphoretic.   Psychiatric: Affect normal.   Nursing note and vitals reviewed.            WORKUP:     ENVIRONMENTAL PERCUTANEOUS SKIN TESTING: ADULT  [employer] 10/26/2018   Consent Y   Ordering Physician Kenna   Interpreting Physician Kenna   Testing Technician Ammy MOY CMA   Location Back   Time start:  8:01 AM   Time End:  8:16 AM   Positive Control: Histatrol*ALK 1 mg/ml 6/9   Negative Control: 50% Glycerin 0/0   Cat Hair*ALK (10,000 BAU/ml) 0/0   AP Dog Hair/Dander (1:100 w/v) 0/0   Dust Mite p. 30,000 AU/ml 5/10   Dust Mite f. (30,000 AU/ml) 0/0   Azeem (W/F in millimeters) 6/7   Grass Mix #7 (100,000 BAU/ml) 22/30   Red [place] (W/F in millimeters) 0/0   Maple/Goshen (W/F in millimeters) 0/0   Hackberry (W/F in millimeters) 3/4   Bannock (W/F in millimeters) 0/0   [place]y *ALK (W/F in millimeters) 0/0   American Elm (W/F in millimeters) 0/0   Caroline (W/F in millimeters) 0/0   Black [place] (W/F in millimeters) 0/0   Birch Mix (W/F in millimeters) 0/0   Gran[place] (W/F in millimeters) 0/0   Oak (W/F in millimeters) 6/10   Cocklebur (W/F in millimeters) 0/0   Jamestown (W/F in millimeters) 0/0   White Jose (W/F in millimeters) 0/0   Careless (W/F in millimeters) 0/0   Nettle (W/F in millimeters) 0/0   English Plantain (W/F in millimeters) 0/0   Kochia (W/F in millimeters) 0/0   Lamb's Quarter (W/F in millimeters) 0/0   Marshelder (W/F in millimeters) 00   Ragweed Mix* ALK (W/F in millimeters) 4/5   Russian Thistle (W/F in millimeters) 4/5   Sagebrush/Mugwort (W/F in millimeters) 0/0   Sheep Sorrel (W/F in millimeters) 0/0   Feather Mix* ALK (W/F in millimeters) 0/0   Penicillium Mix (1:10 w/v) 0/0   Curvularia spicifera (1:10 w/v) 0/0   Epicoccum (1:10 w/v) 0/0   Aspergillus fumigatus (1:10 w/v): 0/0   Alternaria  tenius (1:10 w/v) 4/10   H. Cladosporium (1:10 w/v) 0/0   Phoma herbarum (1:10 w/v) 0/0      FOOD ALLERGEN PERCUTANEOUS SKIN TESTING  [employer]  10/26/2018   Hazelnut ([place])  1:20 (W/F in millimeters) 10/11          Percutaneous skin puncture testing for aeroallergens performed today (October 26, 2018) showed sensitivity to dust mite (Dermatophagoides pteronyssinus), grass pollen (Azeem grass and grass mix #7) tree pollen (Hackberry, and oak), weed pollen( ragweed mix, Russian thistle) and Alternaria mold.    Percutaneous skin puncture testing for hazelnut was positive.      The patient had appropriate responses to positive and negative controls.      SPIROMETRY       FVC 3.69L (79% of predicted).     FEV1 2.20L (61% of predicted).     FEV1/FVC 60%     FEF 25%-75%  1.22L/s (38% of predicted)    The office spirometry performed today suggests moderate obstruction.      Asthma Control Test (ACT) total score: 20  ACT is not valid. The patient reports a different frequency of symptoms during history taking.       ASSESSMENT/PLAN:      Problem List Items Addressed This Visit        Respiratory    1. Severe persistent asthma without complication - Primary (Chronic)  The office spirometry performed today is unchanged.  Suboptimal symptom control.  The primary reason is improper adherence to the controller medications.  With current FEV1, he is not a good candidate for aspirin desensitization.  Ideally, we would need to make sure that he is FEV1 is above 70% to be able to perform aspirin desensitization as an outpatient.  --Stop Breo Ellipta 100/25 mcg.  --Start Breo Ellipta 200/25 mcg 1 puff once daily.  Encouraged daily use.  --Continue montelukast 10 mg by mouth once daily.  --Continue using albuterol inhaler 2-4 puffs every 4 hours as needed for persistent cough/chest tightness/wheezing/shortness of breath.  Depending on symptom control, he may qualify for biologics such as omalizumab, mepolizumab, benralizumab,  or dupilumab.    Dupilumab would be of a great interest considering that there is a reassuring data not only on asthma control but polyp control as well.    Relevant Medications    fluticasone-vilanterol (BREO ELLIPTA) 200-25 MCG/INH inhaler        Other Relevant Orders    Spirometry, Breathing Capacity (Completed)    CBC with platelets differential (Completed)    IgE (Completed)       Other    2. Aspirin sensitivity    -Continue strict avoidance.    Aspirin desensitization once possible.          Other Visit Diagnoses     3. Reaction to food, initial encounter     Hazelnut allergy.  New diagnosis.  While the patient tolerates other tree nuts, he is worried about cross-contamination, and prefers to avoid all tree nuts from now on.  - Provided written information about food avoidance. Advised about the importance of reading labels, ordering safe foods in the restaurants and risk of cross-contamination.  - Instructed about the recognizing and treating allergic reactions.  - Instructed to carry epinephrine autoinjector 2-Byron and cetirizine all the time and use it accordingly in case of accidental exposure. Call 911 or see ER after use of epinephrine.  - Advised to visit www.[email or web address]    Relevant Medications        EPINEPHrine (AUVI-Q) 0.3 MG/0.3ML injection 2-pack        Other Relevant Orders    Allergen hazelnut IgE (Completed)    Hazelnut Component Allergy Panel (Completed)    ALLERGY SKIN TESTS,ALLERGENS (Completed)    4. Need for prophylactic vaccination and inoculation against influenza        Relevant Orders    FLU VACCINE, SPLIT VIRUS, IM (QUADRIVALENT) [81684]- >3 YRS (Completed)    Vaccine Administration, Initial [41146] (Completed)          5. Nasal polyp     -Continue Nasacort 2 sprays in each nostril twice daily.      6.  Seasonal allergic rhinitis due to pollen      Avoidance measures discussed and information provided.      -Continue Nasacort 2 sprays in each nostril twice daily.     -Start  azelastine 2 sprays in each nostril twice daily as needed.      If symptoms persist despite medications and allergen avoidance, or if medications are not tolerated, allergen immunotherapy is recommended.      Discussed briefly about allergen immunotherapy today.         7.  Allergic rhinitis due to dust mite      Return in about 6 weeks (around 12/7/2018), or if symptoms worsen or fail to improve.    Thank you for allowing us to participate in the care of this patient. Please feel free to contact us if there are any questions or concerns about the patient.    Disclaimer: This note consists of symbols derived from keyboarding, dictation and/or voice recognition software. As a result, there may be errors in the script that have gone undetected. Please consider this when interpreting information found in this chart.    Sudarshan Pierson MD, [facility]  Allergy, Asthma and Immunology  [place], [place] and [facility] Influenza Immunization Documentation    1.  Is the person to be vaccinated sick today?   No    2. Does the person to be vaccinated have an allergy to a component   of the vaccine?   No  Egg Allergy Algorithm Link    3. Has the person to be vaccinated ever had a serious reaction   to influenza vaccine in the past?   No    4. Has the person to be vaccinated ever had Guillain-Barré syndrome?   No    Form completed by Brandee MOY Allergy RN               Again, thank you for allowing me to participate in the care of your patient.        Sincerely,        Sudarshan Pierson MD

## 2018-10-26 NOTE — PATIENT INSTRUCTIONS
Asthma action per action plan.  -Continue Nasacort 2 sprays in each nostril twice daily.  Start azelastine 2 sprays in each nostril twice daily as needed.    Get the bloodwork done.    AEROALLERGEN AVOIDANCE INSTRUCTIONS  MOLD  Indoors, mold season is year round. Outdoors, most mold prefer seasons with high humidity. Mold prefers damp, dark, warm places. Here are some tips on how to avoid mold exposure.  1.  Keep humidity inside between 35-50% with air conditioning or dehumidifier. The humidity level can be checked with a meter from a hardware store.   2.  Clean surfaces where mold grows and dry wet areas.  3.  Avoid steam cleaning carpets and discard moldy belongings.  4.  Wear a mask when doing yard work and refrain from walking through uncut fields or playing in leaves.  5.  Minimize use of potted plants and do not keep them indoors.  6.  Consider an allergy cover for the pillow and mattress.  POLLEN  Visit www.pollen.com  Pollens are the tiny airborne particles given off by trees, weeds, and grasses. They can be the cause of seasonal allergic rhinitis or hay fever symptoms, which include stuffy, itchy, runny nose, redness, swelling and itching of the eyes, and itching of the ears and throat. Here are some tips on how to avoid pollen exposure.  1. Keep windows closed and use the air conditioner when possible.  2.  Avoid outside exposure in the early morning as pollen counts are highest at that time.  3.  Take a shower and wash hair each night.  4.  Consider wearing a mask when working in the yard and/or garden.  5.  Clean furnace filter monthly with HEPA filters. Consider a HEPA filter vacuum  which will prevent pollen from being reintroduced into the air.   DUST MITES  Dust mites can never be entirely eliminated in the house no matter how clean your house is. Dust mites are attracted to warm, moist areas and feed on dead skin flakes. Here are tips to minimize dust mites in your home.  1.  Encase pillows  and mattress/box springs in zippered allergy covers.  2.  Wash bedding in hot water (at least 130 F) every 7-14 days.  3.  Avoid curtains, carpet, and upholstered furniture if possible.  4.  Use HEPA air filters and a HEPA filter vacuum . Change filters monthly. Vacuum weekly.  5.  Keep bedroom simple, avoiding clutter, so it can quickly be dusted.  6.  Cover heating vents with vent filters.   Keep humidity inside between 35-50% with air conditioning or dehumidifier. The humidity level can be checked with a meter from a hardware store.   7.  Keep stuffed toys in a closed container and wash or freeze regularly.  8.  Keep clothing in the closet with the door closed.

## 2018-10-26 NOTE — LETTER
ANAPHYLAXIS ALLERGY PLAN    Name: Marco Stovall      :  1965    Allergy to:  hazelnuts    Weight: 180 lbs 3.2 oz           Asthma:  Yes  (higher risk for a severe reaction)      Do not depend on antihistamines or inhalers (bronchodilators) to treat a severe reaction; USE EPINEPHRINE      MEDICATIONS/DOSES  Epinephrine:  AUVI-Q  Epinephrine dose:  0.3 mg IM  Antihistamine:  Zyrtec (Cetirizine)  Antihistamine dose:  20 mg  Other (e.g., inhaler-bronchodilator if wheezing):  Albuterol 4 puffs        ANAPHYLAXIS ALLERGY PLAN (Page 2)  Patient:  Marco Stovall  :  1965         Electronically signed on 2018 by:  Sudarshan Pierson MD  Parent/Guardian Authorization Signature:  ___________________________ Date:    FORM PROVIDED COURTESY OF FOOD ALLERGY RESEARCH & EDUCATION (FARE) ([email or web address]) 2017

## 2018-10-26 NOTE — LETTER
My Asthma Action Plan  Name: Marco Stovall   YOB: 1965  Date: 10/26/2018    My doctor: Sudarshan Pierson MD   My clinic: [facility]        My Control Medicine: Fluticasone furoate + vilanterol (Breo Ellipta)-  200/25mcg 1 puff once daily  Montelukast (Singulair) -  10 mg by mouth once daily at bedtime  My Rescue Medicine: Albuterol (Proair/Ventolin/Proventil) inhaler 2-4 puffs every 4 hrs as needed   My Asthma Severity: moderate persistent  Avoid your asthma triggers: pollens, aspirin, strong odors and fumes, exercise or sports and cold air               GREEN ZONE   Good Control    I feel good    No cough or wheeze    Can work, sleep and play without asthma symptoms       Take your asthma control medicine every day.     1. If exercise triggers your asthma, take your rescue medication    15 minutes before exercise or sports, and    During exercise if you have asthma symptoms  2. Spacer to use with inhaler: If you have a spacer, make sure to use it with your inhaler             YELLOW ZONE Getting Worse  I have ANY of these:    I do not feel good    Cough or wheeze    Chest feels tight    Wake up at night   1. Keep taking your Green Zone medications  2. Start taking your rescue medicine:    every 20 minutes for up to 1 hour. Then every 4 hours for 24-48 hours.  3. If you stay in the Yellow Zone for more than 12-24 hours, contact your doctor.  4. If you do not return to the Green Zone in 12-24 hours or you get worse, start taking your oral steroid medicine if prescribed by your provider.           RED ZONE Medical Alert - Get Help  I have ANY of these:    I feel awful    Medicine is not helping    Breathing getting harder    Trouble walking or talking    Nose opens wide to breathe       1. Take your rescue medicine NOW  2. If your provider has prescribed an oral steroid medicine, start taking it NOW  3. Call your doctor NOW  4. If you are still in the Red Zone after 20 minutes and you  have not reached your doctor:    Take your rescue medicine again and    Call 911 or go to the emergency room right away    See your regular doctor within 2 weeks of an Emergency Room or Urgent Care visit for follow-up treatment.          Annual Reminders:  Meet with Asthma Educator,  Flu Shot in the Fall, consider Pneumonia Vaccination for patients with asthma (aged 19 and older).    Pharm[place]: [facility] [place] [address]                      Asthma Triggers  How To Control Things That Make Your Asthma Worse    Triggers are things that make your asthma worse.  Look at the list below to help you find your triggers and what you can do about them.  You can help prevent asthma flare-ups by staying away from your triggers.      Trigger                                                          What you can do   Cigarette Smoke  Tobacco smoke can make asthma worse. Do not allow smoking in your home, car or around you.  Be sure no one smokes at a child s day care or school.  If you smoke, ask your health care provider for ways to help you quit.  Ask family members to quit too.  Ask your health care provider for a referral to Quit Plan to help you quit smoking, or call 4-189-761-PLAN.     Colds, Flu, Bronchitis  These are common triggers of asthma. Wash your hands often.  Don t touch your eyes, nose or mouth.  Get a flu shot every year.     Dust Mites  These are tiny bugs that live in cloth or carpet. They are too small to see. Wash sheets and blankets in hot water every week.   Encase pillows and mattress in dust mite proof covers.  Avoid having carpet if you can. If you have carpet, vacuum weekly.   Use a dust mask and HEPA vacuum.   Pollen and Outdoor Mold  Some people are allergic to trees, grass, or weed pollen, or molds. Try to keep your windows closed.  Limit time out doors when pollen count is high.   Ask you health care provider about taking medicine during allergy season.     Animal  Dander  Some people are allergic to skin flakes, urine or saliva from pets with fur or feathers. Keep pets with fur or feathers out of your home.    If you can t keep the pet outdoors, then keep the pet out of your bedroom.  Keep the bedroom door closed.  Keep pets off cloth furniture and away from stuffed toys.     Mice, Rats, and Cockroaches  Some people are allergic to the waste from these pests.   Cover food and garbage.  Clean up spills and food crumbs.  Store grease in the refrigerator.   Keep food out of the bedroom.   Indoor Mold  This can be a trigger if your home has high moisture. Fix leaking faucets, pipes, or other sources of water.   Clean moldy surfaces.  Dehumidify basement if it is damp and smelly.   Smoke, Strong Odors, and Sprays  These can reduce air quality. Stay away from strong odors and sprays, such as perfume, powder, hair spray, paints, smoke incense, paint, cleaning products, candles and new carpet.   Exercise or Sports  Some people with asthma have this trigger. Be active!  Ask your doctor about taking medicine before sports or exercise to prevent symptoms.    Warm up for 5-10 minutes before and after sports or exercise.     Other Triggers of Asthma  Cold air:  Cover your nose and mouth with a scarf.  Sometimes laughing or crying can be a trigger.  Some medicines and food can trigger asthma.

## 2018-10-26 NOTE — MR AVS SNAPSHOT
After Visit Summary   10/26/2018    Marco Stovall    MRN: [id number]           Patient Information     Date Of Birth          1965        Visit Information        Provider Department      10/26/2018 7:00 AM Sudarshan Pierson MD [facility]        Today's Diagnoses     Severe persistent asthma without complication    -  1    Aspirin sensitivity        Reaction to food, initial encounter          Care Instructions    Asthma action per action plan.  -Continue Nasacort 2 sprays in each nostril twice daily.  Start azelastine 2 sprays in each nostril twice daily as needed.    Get the bloodwork done.    AEROALLERGEN AVOIDANCE INSTRUCTIONS  MOLD  Indoors, mold season is year round. Outdoors, most mold prefer seasons with high humidity. Mold prefers damp, dark, warm places. Here are some tips on how to avoid mold exposure.  1.  Keep humidity inside between 35-50% with air conditioning or dehumidifier. The humidity level can be checked with a meter from a hardware store.   2.  Clean surfaces where mold grows and dry wet areas.  3.  Avoid steam cleaning carpets and discard moldy belongings.  4.  Wear a mask when doing yard work and refrain from walking through uncut fields or playing in leaves.  5.  Minimize use of potted plants and do not keep them indoors.  6.  Consider an allergy cover for the pillow and mattress.  POLLEN  Visit www.pollen.com  Pollens are the tiny airborne particles given off by trees, weeds, and grasses. They can be the cause of seasonal allergic rhinitis or hay fever symptoms, which include stuffy, itchy, runny nose, redness, swelling and itching of the eyes, and itching of the ears and throat. Here are some tips on how to avoid pollen exposure.  1. Keep windows closed and use the air conditioner when possible.  2.  Avoid outside exposure in the early morning as pollen counts are highest at that time.  3.  Take a shower and wash hair each night.  4.  Consider wearing a  mask when working in the yard and/or garden.  5.  Clean furnace filter monthly with HEPA filters. Consider a HEPA filter vacuum  which will prevent pollen from being reintroduced into the air.   DUST MITES  Dust mites can never be entirely eliminated in the house no matter how clean your house is. Dust mites are attracted to warm, moist areas and feed on dead skin flakes. Here are tips to minimize dust mites in your home.  1.  Encase pillows and mattress/box springs in zippered allergy covers.  2.  Wash bedding in hot water (at least 130 F) every 7-14 days.  3.  Avoid curtains, carpet, and upholstered furniture if possible.  4.  Use HEPA air filters and a HEPA filter vacuum . Change filters monthly. Vacuum weekly.  5.  Keep bedroom simple, avoiding clutter, so it can quickly be dusted.  6.  Cover heating vents with vent filters.   Keep humidity inside between 35-50% with air conditioning or dehumidifier. The humidity level can be checked with a meter from a hardware store.   7.  Keep stuffed toys in a closed container and wash or freeze regularly.  8.  Keep clothing in the closet with the door closed.              Follow-ups after your visit        Follow-up notes from your care team     Return in about 6 weeks (around 12/7/2018), or if symptoms worsen or fail to improve.      Your next 10 appointments already scheduled     Dec 07, 2018  8:40 AM CST   [facility]    [address] [address]   [phone]              Future tests that were ordered for you today     Open Future Orders        Priority Expected Expires Ordered    CBC with platelets differential Routine  10/26/2019 10/26/2018    IgE Routine  10/26/2019 10/26/2018            Who to contact     If you have questions or need follow up information about today's clinic visit or your schedule please contact [facility] directly at  [phone].  Normal or non-critical lab and imaging results will be communicated to you by MyChart, letter or phone within 4 business days after the clinic has received the results. If you do not hear from us within 7 days, please contact the clinic through MyChart or phone. If you have a critical or abnormal lab result, we will notify you by phone as soon as possible.  Submit refill requests through [employer] or call your pharmacy and they will forward the refill request to us. Please allow 3 business days for your refill to be completed.          Additional Information About Your Visit        Care EveryWhere ID     This is your Care EveryWhere ID. This could be used by other organizations to access your [place] medical records  [id number]        Your Vitals Were     Pulse Temperature Respirations Pulse Oximetry BMI (Body Mass Index)       54 96.7  F (35.9  C) (Oral) 16 99% 27.4 kg/m2        Blood Pressure from Last 3 Encounters:   10/26/18 110/72   09/14/18 124/80   08/29/18 121/85    Weight from Last 3 Encounters:   10/26/18 81.7 kg (180 lb 3.2 oz)   09/14/18 81.4 kg (179 lb 7.3 oz)   08/29/18 80.3 kg (177 lb)              We Performed the Following     Allergen hazelnut IgE     ALLERGY SKIN TESTS,ALLERGENS     Hazelnut Component Allergy Panel     Spirometry, Breathing Capacity          Today's Medication Changes          These changes are accurate as of 10/26/18  9:01 AM.  If you have any questions, ask your nurse or doctor.               Start taking these medicines.        Dose/Directions    azelastine 0.1 % nasal spray   Commonly known as:  ASTELIN   Used for:  Aspirin sensitivity   Started by:  Sudarshan Pierson MD        Dose:  2 spray   Spray 2 sprays into both nostrils 2 times daily as needed for rhinitis   Quantity:  30 mL   Refills:  3       EPINEPHrine 0.3 MG/0.3ML injection 2-pack   Commonly known as:  AUVI-Q   Used for:  Reaction to food, initial encounter   Started by:  Sudarshan Pierson MD         Dose:  0.3 mg   Inject 0.3 mLs (0.3 mg) into the muscle as needed for anaphylaxis   Quantity:  0.6 mL   Refills:  2       fluticasone-vilanterol 200-25 MCG/INH inhaler   Commonly known as:  BREO ELLIPTA   Used for:  Severe persistent asthma without complication   Replaces:  fluticasone-vilanterol 100-25 MCG/INH inhaler   Started by:  Sudarshan Pierson MD        Dose:  1 puff   Inhale 1 puff into the lungs daily   Quantity:  1 Inhaler   Refills:  3         Stop taking these medicines if you haven't already. Please contact your care team if you have questions.     fluticasone-vilanterol 100-25 MCG/INH inhaler   Commonly known as:  BREO ELLIPTA   Replaced by:  fluticasone-vilanterol 200-25 MCG/INH inhaler   Stopped by:  Sudarshan Pierson MD                Where to get your medicines      These medications were sent to [facility], [address] [address]     Phone:  [phone]     EPINEPHrine 0.3 MG/0.3ML injection 2-pack         These medications were sent to [facility] [address] [address]     Phone:  [phone]     azelastine 0.1 % nasal spray    fluticasone-vilanterol 200-25 MCG/INH inhaler                Primary Care Provider Office Phone # Fax #    Andrew Serg Andrade [phone] [address] [address]        Equal Access to Services     [facility]: Hadii aad ku hadasho Soomaali, waaxda luqadaha, qaybta kaalmada adeegyaclarissa, radha talbert . So [facility] [phone].    ATENCIÓN: Si habla español, tiene a adame disposición servicios gratuitos de asistencia lingüística. Lisbethame al [phone].    We comply with applicable federal civil rights laws and [place] laws. We do not discriminate on the basis of race, color, national origin, age, disability, sex, sexual orientation, or gender identity.            Thank you!      Thank you for choosing [facility]  for your care. Our goal is always to provide you with excellent care. Hearing back from our patients is one way we can continue to improve our services. Please take a few minutes to complete the written survey that you may receive in the mail after your visit with us. Thank you!             Your Updated Medication List - Protect others around you: Learn how to safely use, store and throw away your medicines at www.disposemymeds.org.          This list is accurate as of 10/26/18  9:01 AM.  Always use your most recent med list.                   Brand Name Dispense Instructions for use Diagnosis    albuterol 108 (90 Base) MCG/ACT inhaler    PROAIR HFA/PROVENTIL HFA/VENTOLIN HFA    1 Inhaler    Inhale 2 puffs into the lungs every 4 hours as needed for shortness of breath / dyspnea    Mild intermittent asthma without complication       azelastine 0.1 % nasal spray    ASTELIN    30 mL    Spray 2 sprays into both nostrils 2 times daily as needed for rhinitis    Aspirin sensitivity       EPINEPHrine 0.3 MG/0.3ML injection 2-pack    AUVI-Q    0.6 mL    Inject 0.3 mLs (0.3 mg) into the muscle as needed for anaphylaxis    Reaction to food, initial encounter       fluticasone-vilanterol 200-25 MCG/INH inhaler    BREO ELLIPTA    1 Inhaler    Inhale 1 puff into the lungs daily    Severe persistent asthma without complication       montelukast 10 MG tablet    SINGULAIR    30 tablet    Take 1 tablet (10 mg) by mouth At Bedtime    Moderate persistent asthma without complication, Nasal polyposis       multivitamin per tablet     100 tablet    Take 1 tablet by mouth daily.    Routine general medical examination at a health care facility       sildenafil 20 MG tablet    REVATIO    30 tablet    Take 1 tablet (20 mg) by mouth three times daily for pulmonary hypertension.    Erectile dysfunction, unspecified erectile dysfunction type       valACYclovir 500 MG tablet    VALTREX    40 tablet     Take 2 tablets (1,000 mg) by mouth 2 times daily for 10 days    Genital herpes simplex, unspecified site

## 2018-10-26 NOTE — NURSING NOTE
RN reviewed Asthma Action Plan with patient. Verbalized understanding.No further questions.    RN reviewed Anaphylaxis Action Plan with patient. Educated on the symptoms and treatment of anaphylaxis. Went through the different ways that a reaction can present, and the body systems that it can affect. Patient verbalized understanding.     Brandee Ray RN

## 2018-10-27 PROBLEM — J30.1 SEASONAL ALLERGIC RHINITIS DUE TO POLLEN: Status: ACTIVE | Noted: 2018-10-27

## 2018-10-27 PROBLEM — J30.89 ALLERGIC RHINITIS DUE TO DUST MITE: Status: ACTIVE | Noted: 2018-10-27

## 2018-10-27 ASSESSMENT — ASTHMA QUESTIONNAIRES: ACT_TOTALSCORE: 20

## 2018-10-30 ENCOUNTER — TELEPHONE (OUTPATIENT)
Dept: ALLERGY | Facility: CLINIC | Age: 53
End: 2018-10-30

## 2018-10-30 LAB
COR A 14 STORAGE PROTEIN 2S HAZELNUT: <0.1 KU(A)/L
HAZELNUT (NCOR A) 9 IGE QN: 0.13 KU(A)/L
HAZELNUT (RCOR A) 1 IGE QN: <0.1 KU(A)/L
HAZELNUT (RCOR A) 8 IGE QN: <0.1 KU(A)/L
HAZELNUT IGE QN: 0.69 KU(A)/L
IGE SERPL-ACNC: 121 KIU/L (ref 0–114)

## 2018-10-30 NOTE — TELEPHONE ENCOUNTER
Fax received from [facility]. They have been unable to reach pt regarding shipment of Auvi-Q. Pt will need to contact them at [phone] to set up delivery.     Called and spoke with pt regarding this, number given. Agreeable.     Brandee MOY   Allergy RN

## 2018-11-01 ENCOUNTER — TELEPHONE (OUTPATIENT)
Dept: OTOLARYNGOLOGY | Facility: CLINIC | Age: 53
End: 2018-11-01

## 2018-11-01 NOTE — TELEPHONE ENCOUNTER
Left message on answering machine for patient to call back.    If wishes Surgery then please route message to Dr Ji to place the order and will then be routed to [facility] for scheduling.    Tarsha Campo RN  [facility]

## 2018-11-01 NOTE — TELEPHONE ENCOUNTER
----- Message from Chelsea Ji MD sent at 10/31/2018  4:48 PM CDT -----  Regarding: discuss sinus surgery  Hello, I have been trying to reach this patient to see if he would be willing to move forward with his sinus surgery to address his nasal polyps on 11/9/18. Could you please continue to check in with him to see if we can get him scheduled? He will need to be done at [facility] so it will need to be either 11/9/18 or 12/14/18. I can put the orders in if I know he has been contacted.    Dr. Ji

## 2018-11-02 NOTE — MR AVS SNAPSHOT
After Visit Summary   9/14/2018    Marco Stovall    MRN: [id number]           Patient Information     Date Of Birth          1965        Visit Information        Provider Department      9/14/2018 7:20 AM Sudarshan Pierson MD [facility]        Today's Diagnoses     Nasal polyposis    -  1    Adverse effect of drug, initial encounter        Other chronic rhinitis        Moderate persistent asthma without complication          Care Instructions    Asthma management per asthma action plan.  Nasacort 2 sprays/nostril twice daily            Follow-ups after your visit        Follow-up notes from your care team     Return in about 6 weeks (around 10/26/2018), or if symptoms worsen or fail to improve.      Your next 10 appointments already scheduled     Oct 26, 2018  7:00 AM CDT   Return Visit with Sudarshan Pierson MD   [facility]    [address] [id number] [phone]              Who to contact     If you have questions or need follow up information about today's clinic visit or your schedule please contact [facility] directly at [phone].  Normal or non-critical lab and imaging results will be communicated to you by MyChart, letter or phone within 4 business days after the clinic has received the results. If you do not hear from us within 7 days, please contact the clinic through MyChart or phone. If you have a critical or abnormal lab result, we will notify you by phone as soon as possible.  Submit refill requests through [employer] or call your pharmacy and they will forward the refill request to us. Please allow 3 business days for your refill to be completed.          Additional Information About Your Visit        Care EveryWhere ID     This is your Care EveryWhere ID. This could be used by other organizations to access your [place] medical records  [id number]        Your Vitals Were     Pulse  Temperature Respirations Pulse Oximetry BMI (Body Mass Index)       50 96.7  F (35.9  C) (Oral) 16 98% 27.29 kg/m2        Blood Pressure from Last 3 Encounters:   09/14/18 124/80   08/29/18 121/85   08/22/18 114/82    Weight from Last 3 Encounters:   09/14/18 81.4 kg (179 lb 7.3 oz)   08/29/18 80.3 kg (177 lb)   08/22/18 80.3 kg (177 lb)              We Performed the Following     ALBUTEROL UNIT DOSE, 1 MG     BRONCHODILATION RESPONSE, PRE/POST ADMIN     OPTICHAMBER          Today's Medication Changes          These changes are accurate as of 9/14/18  8:50 AM.  If you have any questions, ask your nurse or doctor.               Start taking these medicines.        Dose/Directions    fluticasone-vilanterol 100-25 MCG/INH inhaler   Commonly known as:  BREO ELLIPTA   Used for:  Moderate persistent asthma without complication   Started by:  Sudarshan Pierson MD        Dose:  1 puff   Inhale 1 puff into the lungs daily   Quantity:  1 Inhaler   Refills:  3       montelukast 10 MG tablet   Commonly known as:  SINGULAIR   Used for:  Moderate persistent asthma without complication, Nasal polyposis   Started by:  Sudarshan Pierson MD        Dose:  10 mg   Take 1 tablet (10 mg) by mouth At Bedtime   Quantity:  30 tablet   Refills:  3            Where to get your medicines      These medications were sent to [facility] [address]  [address] [address]     Phone:  [phone]     fluticasone-vilanterol 100-25 MCG/INH inhaler    montelukast 10 MG tablet                Primary Care Provider Office Phone # Fax #    Andrew Andrade [phone] [address] [address]        Equal Access to Services     [facility] YEAGER AH: Joyce Luna, gordon adams, radha rodriguez. So [facility] [phone].    ATENCIÓN: Si habla español, tiene a adame disposición servicios gratuitos de asistencia  lingüísticaGricel Dutton al [phone].    We comply with applicable federal civil rights laws and [place] laws. We do not discriminate on the basis of race, color, national origin, age, disability, sex, sexual orientation, or gender identity.            Thank you!     Thank you for choosing [facility]  for your care. Our goal is always to provide you with excellent care. Hearing back from our patients is one way we can continue to improve our services. Please take a few minutes to complete the written survey that you may receive in the mail after your visit with us. Thank you!             Your Updated Medication List - Protect others around you: Learn how to safely use, store and throw away your medicines at www.disposemymeds.org.          This list is accurate as of 9/14/18  8:50 AM.  Always use your most recent med list.                   Brand Name Dispense Instructions for use Diagnosis    albuterol 108 (90 Base) MCG/ACT inhaler    PROAIR HFA/PROVENTIL HFA/VENTOLIN HFA    1 Inhaler    Inhale 2 puffs into the lungs every 4 hours as needed for shortness of breath / dyspnea    Mild intermittent asthma without complication       fluticasone-vilanterol 100-25 MCG/INH inhaler    BREO ELLIPTA    1 Inhaler    Inhale 1 puff into the lungs daily    Moderate persistent asthma without complication       montelukast 10 MG tablet    SINGULAIR    30 tablet    Take 1 tablet (10 mg) by mouth At Bedtime    Moderate persistent asthma without complication, Nasal polyposis       multivitamin per tablet     100 tablet    Take 1 tablet by mouth daily.    Routine general medical examination at a health care facility       sildenafil 20 MG tablet    REVATIO    30 tablet    Take 1 tablet (20 mg) by mouth three times daily for pulmonary hypertension.    Erectile dysfunction, unspecified erectile dysfunction type       valACYclovir 500 MG tablet    VALTREX    40 tablet    Take 2 tablets (1,000 mg) by mouth 2 times daily for 10  Calm days    Genital herpes simplex, unspecified site

## 2018-11-04 NOTE — PROGRESS NOTES
Normal CBC with differential without hypereosinophilia noted.  Normal total serum IgE.  With current results, the patient should qualify for majority of available biologics to control his asthma.      Mild sensitivity for hazelnut noted. Hazelnut component allergy panel with slight sensitivity for Cor a9, which is associated with systemic reactions.    -Continue strict hazelnuts avoidance.  Sudarshan Pierson

## 2018-11-05 NOTE — TELEPHONE ENCOUNTER
Called and spoke to patient, stated that he was given some medication from the allergist; states he is feeling good and does not want to schedule surgery at this time. Informed that I would pass the message to Dr Ji. Advised to follow up with Dr Ji in the future if he decides to he wants surgery. Understanding voiced.     Edel STONE MA

## 2019-03-19 ENCOUNTER — OFFICE VISIT (OUTPATIENT)
Dept: FAMILY MEDICINE | Facility: CLINIC | Age: 54
End: 2019-03-19
Payer: COMMERCIAL

## 2019-03-19 VITALS
RESPIRATION RATE: 14 BRPM | TEMPERATURE: 97.5 F | HEART RATE: 40 BPM | BODY MASS INDEX: 26.95 KG/M2 | WEIGHT: 177.8 LBS | OXYGEN SATURATION: 96 % | HEIGHT: 68 IN | SYSTOLIC BLOOD PRESSURE: 142 MMHG | DIASTOLIC BLOOD PRESSURE: 74 MMHG

## 2019-03-19 DIAGNOSIS — Z11.3 SCREEN FOR STD (SEXUALLY TRANSMITTED DISEASE): Primary | ICD-10-CM

## 2019-03-19 DIAGNOSIS — R03.0 ELEVATED BLOOD PRESSURE READING WITHOUT DIAGNOSIS OF HYPERTENSION: ICD-10-CM

## 2019-03-19 DIAGNOSIS — Z12.11 SCREENING FOR MALIGNANT NEOPLASM OF COLON: ICD-10-CM

## 2019-03-19 DIAGNOSIS — Z01.84 IMMUNITY STATUS TESTING: ICD-10-CM

## 2019-03-19 LAB — T PALLIDUM AB SER QL: NONREACTIVE

## 2019-03-19 PROCEDURE — 87389 HIV-1 AG W/HIV-1&-2 AB AG IA: CPT | Performed by: FAMILY MEDICINE

## 2019-03-19 PROCEDURE — 87591 N.GONORRHOEAE DNA AMP PROB: CPT | Performed by: FAMILY MEDICINE

## 2019-03-19 PROCEDURE — 36415 COLL VENOUS BLD VENIPUNCTURE: CPT | Performed by: FAMILY MEDICINE

## 2019-03-19 PROCEDURE — 87340 HEPATITIS B SURFACE AG IA: CPT | Performed by: FAMILY MEDICINE

## 2019-03-19 PROCEDURE — 86706 HEP B SURFACE ANTIBODY: CPT | Performed by: FAMILY MEDICINE

## 2019-03-19 PROCEDURE — 0064U ANTB TP TOTAL&RPR IA QUAL: CPT | Performed by: FAMILY MEDICINE

## 2019-03-19 PROCEDURE — 86803 HEPATITIS C AB TEST: CPT | Performed by: FAMILY MEDICINE

## 2019-03-19 PROCEDURE — 87491 CHLMYD TRACH DNA AMP PROBE: CPT | Performed by: FAMILY MEDICINE

## 2019-03-19 PROCEDURE — 99213 OFFICE O/P EST LOW 20 MIN: CPT | Performed by: FAMILY MEDICINE

## 2019-03-19 ASSESSMENT — MIFFLIN-ST. JEOR: SCORE: 1631

## 2019-03-19 NOTE — PATIENT INSTRUCTIONS
You will be contacted in 1-2 days for results of your lab tests.    If you do not have antibody to hepatitis B surface antigen, you are recommended to get vaccinated for hepatitis B.     Practice safe sex all the time; use condoms during intercourse.    Blood pressure today slightly elevated.  Observe a low salt diet.  Be consistent with low trans fat and saturated fat diet.  Eat food rich in omega-3-fatty acids as you tolerate. (salmon, olive oil)  Eat 5 cups of vegetables, fruits and whole grains per day.  Limit starchy food (white rice, white bread, white pasta, white potatoes) to less than a cup per meal.  Minimize sweets, junk food and fastfood. Limit soda beverages to one serving per day; best to avoid it altogether though.  Exercise: moderate intensity sustained for at least 30 mins per episode, goal of 150 mins per week at least  Combine cardiovascular and resistance exercises.  These exercise recommendations are in addition to your daily activity at work or home.    Schedule nurse visit for blood pressure recheck in 1-2 months. If blood pressure still more than 130/80 that time, see a provider to discuss management of high blood pressure.    Schedule colonoscopy for screening for colon cancer at your soonest convenience.

## 2019-03-19 NOTE — LETTER
[facility]  [address] [address]  Phone: [phone]      3/21/2019     Marco Stovall  [address] [place] [facility] [address]      Dear Marco:    Below is a copy of your lab results as we had discussed over the phone from your office visit on 3/19/19. All testing was negative for infection. If you have any additional questions or concerns, please call the clinic at [phone].     Results for orders placed or performed in visit on 03/19/19   HIV Antigen Antibody Combo   Result Value Ref Range    HIV Antigen Antibody Combo Nonreactive NR^Nonreactive       Hepatitis C Screen Reflex to HCV RNA Quant and Genotype   Result Value Ref Range    Hepatitis C Antibody Nonreactive NR^Nonreactive   Treponema Abs w Reflex to RPR and Titer   Result Value Ref Range    Treponema Antibodies Nonreactive NR^Nonreactive   Hepatitis B Surface Antibody   Result Value Ref Range    Hepatitis B Surface Antibody 0.25 <8.00 m[IU]/mL   Hepatitis B surface antigen   Result Value Ref Range    Hep B Surface Agn Nonreactive NR^Nonreactive   Chlamydia trachomatis PCR   Result Value Ref Range    Specimen Description Urine     Chlamydia Trachomatis PCR Negative NEG^Negative   Neisseria gonorrhoeae PCR   Result Value Ref Range    Specimen Descrip Urine     N Gonorrhea PCR Negative NEG^Negative       Sincerely,      Dr. Glez/Lottie Mendosa MA

## 2019-03-19 NOTE — PROGRESS NOTES
SUBJECTIVE:   Marco Stovall is a 53 year old male who presents to clinic today for the following health issues:  Chief Complaint   Patient presents with     STD     Pt here for std screening.     Pt here for std screening, starting a new relationship.  Exposure and tx for herpes in the past.  No current symptoms.    Patient states he is unaware if vaccinated for hep B in past.  Patient would like screening for immuity for this as well.      Problem list and histories reviewed & adjusted, as indicated.  Additional history: as documented    Patient Active Problem List   Diagnosis     Screening for hypertension     Chronic maxillary sinusitis     Hyperlipidemia LDL goal <130     Severe persistent asthma without complication     Nasal polyp     Genital herpes simplex, unspecified site     Samter's triad     Aspirin sensitivity     Seasonal allergic rhinitis due to pollen     Allergic rhinitis due to dust mite     Past Surgical History:   Procedure Laterality Date     ENT SURGERY       ETHMOIDECTOMY  6/16/2014    Procedure: ETHMOIDECTOMY;  Surgeon: Jimbo Yang MD;  Location: [facility]     SURGICAL HISTORY OF -   2010    Nasal Polyp; 3 total surgeries: 1997       Social History     Tobacco Use     Smoking status: Never Smoker     Smokeless tobacco: Never Used   Substance Use Topics     Alcohol use: Yes     Comment: wine or mixed 2 a month or more.     Family History   Problem Relation Age of Onset     Hypertension Mother      Thyroid Disease Mother         hypothyroidism     Hypertension Father      Diabetes Maternal Grandfather      Thyroid Disease Daughter         hypothyroidism     Asthma No family hx of      C.A.D. No family hx of      Cerebrovascular Disease No family hx of      Breast Cancer No family hx of      Cancer - colorectal No family hx of      Prostate Cancer No family hx of          Current Outpatient Medications   Medication Sig Dispense Refill     fluticasone-vilanterol (BREO ELLIPTA) 200-25  MCG/INH inhaler Inhale 1 puff into the lungs daily 1 Inhaler 3     montelukast (SINGULAIR) 10 MG tablet Take 1 tablet (10 mg) by mouth At Bedtime 30 tablet 3     Multiple Vitamin (MULTIVITAMIN) per tablet Take 1 tablet by mouth daily. 100 tablet 12     albuterol (PROAIR HFA/PROVENTIL HFA/VENTOLIN HFA) 108 (90 Base) MCG/ACT inhaler Inhale 2 puffs into the lungs every 4 hours as needed for shortness of breath / dyspnea (Patient not taking: Reported on 10/26/2018) 1 Inhaler 11     azelastine (ASTELIN) 0.1 % nasal spray Spray 2 sprays into both nostrils 2 times daily as needed for rhinitis (Patient not taking: Reported on 3/19/2019) 30 mL 3     EPINEPHrine (AUVI-Q) 0.3 MG/0.3ML injection 2-pack Inject 0.3 mLs (0.3 mg) into the muscle as needed for anaphylaxis 0.6 mL 2     sildenafil (REVATIO) 20 MG tablet Take 1 tablet (20 mg) by mouth three times daily for pulmonary hypertension. 30 tablet 11     valACYclovir (VALTREX) 500 MG tablet Take 2 tablets (1,000 mg) by mouth 2 times daily for 10 days 40 tablet 5     Allergies   Allergen Reactions     Advil [Ibuprofen Micronized] Other (See Comments)     Tight chest     Asa [Aspirin] Nausea and Vomiting     Asthma - tight chest     Hazelnuts [Nuts] Other (See Comments)     Chest tight. Positive SPT. Tolerates other tree nuts and peanut.     Naprosyn [Naproxen] GI Disturbance     Stomach      BP Readings from Last 3 Encounters:   03/19/19 142/74   10/26/18 110/72   09/14/18 124/80    Wt Readings from Last 3 Encounters:   03/19/19 80.6 kg (177 lb 12.8 oz)   10/26/18 81.7 kg (180 lb 3.2 oz)   09/14/18 81.4 kg (179 lb 7.3 oz)                    Reviewed and updated as needed this visit by clinical staff  Tobacco  Allergies  Meds  Problems  Med Hx  Surg Hx  Fam Hx  Soc Hx        Reviewed and updated as needed this visit by Provider  Tobacco  Allergies  Meds  Problems  Med Hx  Surg Hx  Fam Hx         ROS:  C: NEGATIVE for fever, chills, change in weight  I: NEGATIVE for  "worrisome rashes, moles or lesions  R: NEGATIVE for significant cough or SOB  CV: NEGATIVE for chest pain, palpitations or peripheral edema  GI: NEGATIVE for nausea, abdominal pain, heartburn, or change in bowel habits  :see above  H: NEGATIVE for bleeding problems    OBJECTIVE:                                                    /74   Pulse (!) 40   Temp 97.5  F (36.4  C)   Resp 14   Ht 1.727 m (5' 8\")   Wt 80.6 kg (177 lb 12.8 oz)   SpO2 96%   BMI 27.03 kg/m    Body mass index is 27.03 kg/m .  GEN: alert, oriented x 3, NAD  EYES: no icterus  SKIN: no jaundice/rash  : deferred per patient.  ABD: rounded, soft, non-tender, no HSM    Diagnostic test results:  Diagnostic Test Results:  none      ASSESSMENT/PLAN:                                                        ICD-10-CM    1. Screen for STD (sexually transmitted disease) Z11.3 Chlamydia trachomatis PCR     Neisseria gonorrhoeae PCR     HIV Antigen Antibody Combo     Hepatitis C Screen Reflex to HCV RNA Quant and Genotype     Treponema Abs w Reflex to RPR and Titer     Hepatitis B surface antigen  Discussed course, transmission and prevention of STDs. Advised safe sexual practices.      2. Elevated blood pressure reading without diagnosis of hypertension R03.0 Patient was advised BP slightoly above normal range today.  No previous diagnosis of HTN.  Patient was advised risks of persistent and untreated BP elevation.  Patient was advised low salt diet.  Recheck BP 1 month. If still >130/80, discuss treatment.     3. Immunity status testing Z01.84 Hepatitis B Surface Antibody  Discussed with patient value of hep B vaccination. He expressed interest in vaccination if not immune. He preferred to pursue testing for immunity today.     4. Screening for malignant neoplasm of colon Z12.11 GASTROENTEROLOGY ADULT REF PROCEDURE ONLY [facility] [place] [facility] [phone]; [place] [facility]       Follow up with RN 1-2 months for BP recheck   Patient " Instructions   You will be contacted in 1-2 days for results of your lab tests.    If you do not have antibody to hepatitis B surface antigen, you are recommended to get vaccinated for hepatitis B.     Practice safe sex all the time; use condoms during intercourse.    Blood pressure today slightly elevated.  Observe a low salt diet.  Be consistent with low trans fat and saturated fat diet.  Eat food rich in omega-3-fatty acids as you tolerate. (salmon, olive oil)  Eat 5 cups of vegetables, fruits and whole grains per day.  Limit starchy food (white rice, white bread, white pasta, white potatoes) to less than a cup per meal.  Minimize sweets, junk food and fastfood. Limit soda beverages to one serving per day; best to avoid it altogether though.  Exercise: moderate intensity sustained for at least 30 mins per episode, goal of 150 mins per week at least  Combine cardiovascular and resistance exercises.  These exercise recommendations are in addition to your daily activity at work or home.    Schedule nurse visit for blood pressure recheck in 1-2 months. If blood pressure still more than 130/80 that time, see a provider to discuss management of high blood pressure.    Schedule colonoscopy for screening for colon cancer at your soonest convenience.      Adriano Glez MD  [facility] [place]

## 2019-03-20 LAB
C TRACH DNA SPEC QL NAA+PROBE: NEGATIVE
N GONORRHOEA DNA SPEC QL NAA+PROBE: NEGATIVE
SPECIMEN SOURCE: NORMAL
SPECIMEN SOURCE: NORMAL

## 2019-03-21 LAB
HBV SURFACE AB SERPL IA-ACNC: 0.25 M[IU]/ML
HBV SURFACE AG SERPL QL IA: NONREACTIVE
HCV AB SERPL QL IA: NONREACTIVE
HIV 1+2 AB+HIV1 P24 AG SERPL QL IA: NONREACTIVE

## 2019-04-02 ENCOUNTER — TELEPHONE (OUTPATIENT)
Dept: FAMILY MEDICINE | Facility: CLINIC | Age: 54
End: 2019-04-02

## 2019-04-02 ENCOUNTER — ALLIED HEALTH/NURSE VISIT (OUTPATIENT)
Dept: FAMILY MEDICINE | Facility: CLINIC | Age: 54
End: 2019-04-02
Payer: COMMERCIAL

## 2019-04-02 ENCOUNTER — OFFICE VISIT (OUTPATIENT)
Dept: FAMILY MEDICINE | Facility: CLINIC | Age: 54
End: 2019-04-02
Payer: COMMERCIAL

## 2019-04-02 VITALS
SYSTOLIC BLOOD PRESSURE: 118 MMHG | HEART RATE: 57 BPM | DIASTOLIC BLOOD PRESSURE: 76 MMHG | RESPIRATION RATE: 18 BRPM | OXYGEN SATURATION: 97 %

## 2019-04-02 DIAGNOSIS — R03.0 ELEVATED BLOOD PRESSURE READING WITHOUT DIAGNOSIS OF HYPERTENSION: Primary | ICD-10-CM

## 2019-04-02 DIAGNOSIS — Z23 NEED FOR VACCINATION: Primary | ICD-10-CM

## 2019-04-02 PROCEDURE — 90471 IMMUNIZATION ADMIN: CPT

## 2019-04-02 PROCEDURE — 99207 ZZC NO CHARGE NURSE ONLY: CPT

## 2019-04-02 PROCEDURE — 90746 HEPB VACCINE 3 DOSE ADULT IM: CPT

## 2019-04-02 NOTE — NURSING NOTE
Patient comes into the clinic today for a BP CK.  Medications and allergies are gone over with the patient and are up to date.  Hannah CULP RN        LOV 3/19/19-     Patient was advised BP slightoly above normal range today.  No previous diagnosis of HTN.  Patient was advised risks of persistent and untreated BP elevation.  Patient was advised low salt diet.  Recheck BP 1 month. If still >130/80, discuss treatment

## 2019-04-02 NOTE — PROGRESS NOTES
Due to injection administration, patient instructed to remain in clinic for 15 minutes  afterwards, and to report any adverse reaction to me immediately.  Loida Quinonez CMA..........4/2/2019 7:52 AM

## 2019-05-06 ENCOUNTER — HOSPITAL ENCOUNTER (EMERGENCY)
Facility: CLINIC | Age: 54
Discharge: HOME | End: 2019-05-06
Attending: FAMILY MEDICINE | Admitting: FAMILY MEDICINE
Payer: COMMERCIAL

## 2019-05-06 ENCOUNTER — APPOINTMENT (OUTPATIENT)
Dept: ULTRASOUND IMAGING | Facility: CLINIC | Age: 54
End: 2019-05-06
Attending: FAMILY MEDICINE
Payer: COMMERCIAL

## 2019-05-06 VITALS
OXYGEN SATURATION: 98 % | DIASTOLIC BLOOD PRESSURE: 81 MMHG | HEART RATE: 58 BPM | TEMPERATURE: 97.8 F | HEIGHT: 68 IN | BODY MASS INDEX: 26.52 KG/M2 | SYSTOLIC BLOOD PRESSURE: 133 MMHG | WEIGHT: 175 LBS

## 2019-05-06 DIAGNOSIS — N45.2 ORCHITIS: ICD-10-CM

## 2019-05-06 LAB
ALBUMIN UR-MCNC: NEGATIVE MG/DL
APPEARANCE UR: CLEAR
BILIRUB UR QL STRIP: NEGATIVE
COLOR UR AUTO: YELLOW
GLUCOSE UR STRIP-MCNC: NEGATIVE MG/DL
HGB UR QL STRIP: NEGATIVE
KETONES UR STRIP-MCNC: 5 MG/DL
LEUKOCYTE ESTERASE UR QL STRIP: NEGATIVE
NITRATE UR QL: NEGATIVE
PH UR STRIP: 5 PH (ref 5–7)
SOURCE: ABNORMAL
SP GR UR STRIP: 1.03 (ref 1–1.03)
UROBILINOGEN UR STRIP-MCNC: 2 MG/DL (ref 0–2)

## 2019-05-06 PROCEDURE — 99285 EMERGENCY DEPT VISIT HI MDM: CPT | Mod: Z6 | Performed by: FAMILY MEDICINE

## 2019-05-06 PROCEDURE — 99285 EMERGENCY DEPT VISIT HI MDM: CPT | Mod: 25 | Performed by: FAMILY MEDICINE

## 2019-05-06 PROCEDURE — 93976 VASCULAR STUDY: CPT

## 2019-05-06 PROCEDURE — 25000128 H RX IP 250 OP 636: Performed by: FAMILY MEDICINE

## 2019-05-06 PROCEDURE — 81003 URINALYSIS AUTO W/O SCOPE: CPT | Performed by: FAMILY MEDICINE

## 2019-05-06 PROCEDURE — 96372 THER/PROPH/DIAG INJ SC/IM: CPT | Performed by: FAMILY MEDICINE

## 2019-05-06 PROCEDURE — 25000132 ZZH RX MED GY IP 250 OP 250 PS 637: Performed by: FAMILY MEDICINE

## 2019-05-06 RX ORDER — LIDOCAINE HYDROCHLORIDE 10 MG/ML
INJECTION, SOLUTION EPIDURAL; INFILTRATION; INTRACAUDAL; PERINEURAL
Status: DISCONTINUED
Start: 2019-05-06 | End: 2019-05-06 | Stop reason: HOSPADM

## 2019-05-06 RX ORDER — ACETAMINOPHEN 500 MG
1000 TABLET ORAL EVERY 6 HOURS PRN
COMMUNITY
End: 2019-12-03

## 2019-05-06 RX ORDER — CEFTRIAXONE SODIUM 1 G
250 VIAL (EA) INJECTION ONCE
Status: COMPLETED | OUTPATIENT
Start: 2019-05-06 | End: 2019-05-06

## 2019-05-06 RX ORDER — DOXYCYCLINE 100 MG/1
100 CAPSULE ORAL 2 TIMES DAILY
Qty: 20 CAPSULE | Refills: 0 | Status: SHIPPED | OUTPATIENT
Start: 2019-05-06 | End: 2019-05-16

## 2019-05-06 RX ORDER — DOXYCYCLINE 100 MG/1
100 CAPSULE ORAL EVERY 12 HOURS SCHEDULED
Status: COMPLETED | OUTPATIENT
Start: 2019-05-06 | End: 2019-05-06

## 2019-05-06 RX ADMIN — CEFTRIAXONE SODIUM 250 MG: 1 INJECTION, POWDER, FOR SOLUTION INTRAMUSCULAR; INTRAVENOUS at 21:07

## 2019-05-06 RX ADMIN — DOXYCYCLINE 100 MG: 100 CAPSULE ORAL at 21:06

## 2019-05-06 ASSESSMENT — MIFFLIN-ST. JEOR: SCORE: 1618.29

## 2019-05-06 NOTE — ED PROVIDER NOTES
History     Chief Complaint   Patient presents with     Testicular/scrotal Pain     testicular pain for past week, no urinary symptoms, no fevers     HPI  Marco Stovall is a 53 year old male, past medical history is significant for seasonal allergic rhinitis, nasal polyps, genital herpes, asthma, hyperlipidemia, chronic maxillary sinusitis, presents to the emergency department with approximately 1 week of left hemiscrotal pain.  Pain is intermittent, perhaps slightly worse with ambulation, touching the area above the left testicle by his account.  Not associate with any nausea or vomiting.  No abdominal pain or flank pain.  No fever chills or sweats.  He notes no changes in the urinary habits such as frequency, urgency, dysuria or hematuria.  He has tried some Tylenol but notes really no change in discomfort.  He notes that he has had recent evaluation for STI which I reviewed in his chart which was negative.  Previous diagnosis of genital herpes but no current outbreak or symptoms which are consistent with previous experience of genital herpes.  Patient denies any heavy lifting or trauma injury of any kind.  No straddle injury.    Allergies:  Allergies   Allergen Reactions     Advil [Ibuprofen Micronized] Other (See Comments)     Tight chest     Asa [Aspirin] Nausea and Vomiting     Asthma - tight chest     Hazelnuts [Nuts] Other (See Comments)     Chest tight. Positive SPT. Tolerates other tree nuts and peanut.     Naprosyn [Naproxen] GI Disturbance     Stomach        Problem List:    Patient Active Problem List    Diagnosis Date Noted     Seasonal allergic rhinitis due to pollen 10/27/2018     Priority: Medium     Percutaneous skin puncture testing for aeroallergens performed on October 26, 2018 showed sensitivity to dust mite (Dermatophagoides pteronyssinus), grass pollen (Azeem grass and grass mix #7) tree pollen (Hackberry, and oak), weed pollen( ragweed mix, Russian thistle) and Alternaria mold.        Allergic rhinitis due to dust mite 10/27/2018     Priority: Medium     Aspirin sensitivity 10/26/2018     Priority: Medium     Samter's triad 09/14/2018     Priority: Medium     Nasal polyp 08/22/2018     Priority: Medium     Genital herpes simplex, unspecified site 08/22/2018     Priority: Medium     Severe persistent asthma without complication 03/24/2016     Priority: Medium     Hyperlipidemia LDL goal <130 07/20/2011     Priority: Medium     , July, 2011.        Chronic maxillary sinusitis 07/15/2011     Priority: Medium     Seen at [facility], and Dr. Yang. Surgery in 2010.        Screening for hypertension 10/16/2007     Priority: Medium        Past Medical History:    Past Medical History:   Diagnosis Date     Mild intermittent asthma without complication 3/24/2016     Polyp of nasal cavity        Past Surgical History:    Past Surgical History:   Procedure Laterality Date     ENT SURGERY       ETHMOIDECTOMY  6/16/2014    Procedure: ETHMOIDECTOMY;  Surgeon: Jimbo Yang MD;  Location: [facility] OR     SURGICAL HISTORY OF -   2010    Nasal Polyp; 3 total surgeries: 1997       Family History:    Family History   Problem Relation Age of Onset     Hypertension Mother      Thyroid Disease Mother         hypothyroidism     Hypertension Father      Diabetes Maternal Grandfather      Thyroid Disease Daughter         hypothyroidism     Asthma No family hx of      C.A.D. No family hx of      Cerebrovascular Disease No family hx of      Breast Cancer No family hx of      Cancer - colorectal No family hx of      Prostate Cancer No family hx of        Social History:  Marital Status:   [4]  Social History     Tobacco Use     Smoking status: Never Smoker     Smokeless tobacco: Never Used   Substance Use Topics     Alcohol use: Yes     Comment: wine or mixed 2 a month or more.     Drug use: No        Medications:      acetaminophen (TYLENOL) 500 MG tablet   azelastine (ASTELIN) 0.1 %  "nasal spray   doxycycline hyclate (VIBRAMYCIN) 100 MG capsule   fluticasone-vilanterol (BREO ELLIPTA) 200-25 MCG/INH inhaler   montelukast (SINGULAIR) 10 MG tablet   Multiple Vitamin (MULTIVITAMIN) per tablet   valACYclovir (VALTREX) 500 MG tablet   albuterol (PROAIR HFA/PROVENTIL HFA/VENTOLIN HFA) 108 (90 Base) MCG/ACT inhaler   EPINEPHrine (AUVI-Q) 0.3 MG/0.3ML injection 2-pack   sildenafil (REVATIO) 20 MG tablet         Review of Systems   All other systems reviewed and are negative.      Physical Exam   BP: 133/81  Pulse: 58  Temp: 97.8  F (36.6  C)  Height: 172.7 cm (5' 8\")  Weight: 79.4 kg (175 lb)  SpO2: 98 %      Physical Exam   Constitutional: He appears well-developed and well-nourished.   Does Not appear ill or toxic.   Abdominal: Soft. Bowel sounds are normal.   Genitourinary: Penis normal.   Genitourinary Comments: Minimal tenderness around the epididymis approximately left hemiscrotal area.  There is no appreciable swelling, no palpable hydrocele, no erythema or warmth.  There was no inguinal lymphadenopathy.   Musculoskeletal: Normal range of motion.   Nursing note and vitals reviewed.      ED Course        Procedures               Critical Care time:  none               Results for orders placed or performed during the hospital encounter of 05/06/19 (from the past 24 hour(s))   UA reflex to Microscopic   Result Value Ref Range    Color Urine Yellow     Appearance Urine Clear     Glucose Urine Negative NEG^Negative mg/dL    Bilirubin Urine Negative NEG^Negative    Ketones Urine 5 (A) NEG^Negative mg/dL    Specific Gravity Urine 1.028 1.003 - 1.035    Blood Urine Negative NEG^Negative    pH Urine 5.0 5.0 - 7.0 pH    Protein Albumin Urine Negative NEG^Negative mg/dL    Urobilinogen mg/dL 2.0 0.0 - 2.0 mg/dL    Nitrite Urine Negative NEG^Negative    Leukocyte Esterase Urine Negative NEG^Negative    Source Midstream Urine    US Testicular & Scrotum w Doppler Ltd    Narrative    US TESTICULAR AND SCROTUM " WITH DOPPLER LIMITED 5/6/2019 7:59 PM     HISTORY: Left hemiscrotal pain.    COMPARISON: 4/27/2016.    FINDINGS: The testicles are of normal symmetric size bilaterally.  Doppler waveform analysis and color flow imaging show increased blood  flow in the left testicle with no evidence for mass. This is  consistent with orchitis. Blood flow in the right testicle is normal.  The spermatic cord appears normal bilaterally. Small bilateral  appendix epididymis is noted and there are 2 tiny benign left  epididymal head cysts. Blood flow in the epididymis is normal on both  sides.    Very small bilateral hydroceles. No varicoceles.      Impression    IMPRESSION:  1. Left testicular hyperemia suggesting orchitis.  2. Very small bilateral hydroceles.       Medications   cefTRIAXone (ROCEPHIN) injection 250 mg (has no administration in time range)   doxycycline hyclate (VIBRAMYCIN) capsule 100 mg (has no administration in time range)   8:57 PM  I reviewed the urinalysis which is negative as well as the ultrasound which demonstrates left testicular hyperemia suggestive of orchitis with the patient.  We discussed treatment based upon age at which point he inform me that he has been sexually active with multiple partners recently.  This would probably to reconsider the selection of antibiotics for the intended levofloxacin in the over 35 age group recommendations to IM Rocephin and doxycycline.  The patient is comfortable with this based upon his current lifestyle considerations.        Assessments & Plan (with Medical Decision Making)   53-year-old male who presents for evaluation of left hemiscrotal pain x1 week as discussed in the HPI.  No urinary tract symptoms.  Minimal tenderness on exam.  Nontoxic and afebrile.  Urinalysis is unremarkable.  Ultrasound reviewed as above discussion at the time stamp above.  Patient is dispositioned home after receiving IM Rocephin in the emergency department and doxycycline x1 dose here to  complete a full 10-day course.  Return if not improving or worse.  We recommended Tylenol/ibuprofen for pain.  Avoidance of injury or trauma.  All questions answered.  He    Disclaimer: This note consists of symbols derived from keyboarding, dictation and/or voice recognition software. As a result, there may be errors in the script that have gone undetected. Please consider this when interpreting information found in this chart.      I have reviewed the nursing notes.    I have reviewed the findings, diagnosis, plan and need for follow up with the patient.          Medication List      Started    doxycycline hyclate 100 MG capsule  Commonly known as:  VIBRAMYCIN  100 mg, Oral, 2 TIMES DAILY            Final diagnoses:   Orchitis       5/6/2019   [facility] EMERGENCY DEPARTMENT     Maxime May MD  05/06/19 2100

## 2019-05-06 NOTE — ED NOTES
Pt here with c/o scrotal pain. Pt with +hx of herpes but has no current outbreak. Pt with no new sexual partners and denies any s/s of STDs' I had a complete work up in march per pt. Pt states pain is constant and dull for the last week

## 2019-05-06 NOTE — ED AVS SNAPSHOT
[facility] Emergency Department  [address] [address]  Phone:  [phone]  Fax:  [phone]                                    Marco Stovall   MRN: [id number]    Department:  [facility] Emergency Department   Date of Visit:  5/6/2019           After Visit Summary Signature Page    I have received my discharge instructions, and my questions have been answered. I have discussed any challenges I see with this plan with the nurse or doctor.    ..........................................................................................................................................  Patient/Patient Representative Signature      ..........................................................................................................................................  Patient Representative Print Name and Relationship to Patient    ..................................................               ................................................  Date                                   Time    ..........................................................................................................................................  Reviewed by Signature/Title    ...................................................              ..............................................  Date                                               Time          22EPIC Rev 08/18

## 2019-05-07 NOTE — DISCHARGE INSTRUCTIONS
Avoid trauma or injury of any kind.  Tylenol/ibuprofen as needed for pain.  Doxycycline 100 mg twice daily x10 days.  If not improving or worse please return to the emergency department.

## 2019-09-17 ENCOUNTER — OFFICE VISIT (OUTPATIENT)
Dept: FAMILY MEDICINE | Facility: CLINIC | Age: 54
End: 2019-09-17
Payer: COMMERCIAL

## 2019-09-17 ENCOUNTER — TELEPHONE (OUTPATIENT)
Dept: FAMILY MEDICINE | Facility: CLINIC | Age: 54
End: 2019-09-17

## 2019-09-17 VITALS
SYSTOLIC BLOOD PRESSURE: 110 MMHG | WEIGHT: 179 LBS | TEMPERATURE: 96.8 F | RESPIRATION RATE: 16 BRPM | BODY MASS INDEX: 27.13 KG/M2 | HEIGHT: 68 IN | DIASTOLIC BLOOD PRESSURE: 64 MMHG | HEART RATE: 51 BPM | OXYGEN SATURATION: 98 %

## 2019-09-17 DIAGNOSIS — Z11.3 SCREENING FOR STD (SEXUALLY TRANSMITTED DISEASE): Primary | ICD-10-CM

## 2019-09-17 DIAGNOSIS — Z20.2 EXPOSURE TO STD: ICD-10-CM

## 2019-09-17 DIAGNOSIS — Z12.11 SCREEN FOR COLON CANCER: ICD-10-CM

## 2019-09-17 PROCEDURE — 86780 TREPONEMA PALLIDUM: CPT | Performed by: FAMILY MEDICINE

## 2019-09-17 PROCEDURE — 86803 HEPATITIS C AB TEST: CPT | Performed by: FAMILY MEDICINE

## 2019-09-17 PROCEDURE — 36415 COLL VENOUS BLD VENIPUNCTURE: CPT | Performed by: FAMILY MEDICINE

## 2019-09-17 PROCEDURE — 87340 HEPATITIS B SURFACE AG IA: CPT | Performed by: FAMILY MEDICINE

## 2019-09-17 PROCEDURE — 87389 HIV-1 AG W/HIV-1&-2 AB AG IA: CPT | Performed by: FAMILY MEDICINE

## 2019-09-17 PROCEDURE — 99213 OFFICE O/P EST LOW 20 MIN: CPT | Performed by: FAMILY MEDICINE

## 2019-09-17 PROCEDURE — 87491 CHLMYD TRACH DNA AMP PROBE: CPT | Performed by: FAMILY MEDICINE

## 2019-09-17 PROCEDURE — 87591 N.GONORRHOEAE DNA AMP PROB: CPT | Performed by: FAMILY MEDICINE

## 2019-09-17 RX ORDER — METRONIDAZOLE 500 MG/1
500 TABLET ORAL 2 TIMES DAILY
Qty: 14 TABLET | Refills: 0 | Status: SHIPPED | OUTPATIENT
Start: 2019-09-17 | End: 2019-11-18

## 2019-09-17 ASSESSMENT — MIFFLIN-ST. JEOR: SCORE: 1636.44

## 2019-09-17 NOTE — TELEPHONE ENCOUNTER
Panel Management Review      Patient has the following on his problem list:       Composite cancer screening  Chart review shows that this patient is due/due soon for the following Fecal Colorectal (FIT)  Summary:    Patient is due/failing the following:   FIT    Action needed:   Fit test given 9-17-19    Type of outreach:    Patient was given fit test will call in 1 mo to check if done     Questions for provider review:    None                                                                                                                                    Rosanne Brewer CMA     Chart routed to Care Team .

## 2019-09-17 NOTE — PROGRESS NOTES
Subjective     Marco Stovall is a 53 year old male who presents to clinic today for the following health issues: 53 yr old male here for STD screening , reports that he was exposed to someone who tested positive for trichomonas.. Patient states that he has had no symptoms but will like to be tested for STDs. Denies any other acute symptoms.     HPI   Concern - std check   Onset:     Description:   Patient would like blood and urine test for std test. He has had herpes in the past.           Patient Active Problem List   Diagnosis     Screening for hypertension     Chronic maxillary sinusitis     Hyperlipidemia LDL goal <130     Severe persistent asthma without complication     Nasal polyp     Genital herpes simplex, unspecified site     Samter's triad     Aspirin sensitivity     Seasonal allergic rhinitis due to pollen     Allergic rhinitis due to dust mite     Past Surgical History:   Procedure Laterality Date     ENT SURGERY       ETHMOIDECTOMY  6/16/2014    Procedure: ETHMOIDECTOMY;  Surgeon: Jimbo Yang MD;  Location: [facility] OR     SURGICAL HISTORY OF -   2010    Nasal Polyp; 3 total surgeries: 1997       Social History     Tobacco Use     Smoking status: Never Smoker     Smokeless tobacco: Never Used   Substance Use Topics     Alcohol use: Yes     Comment: wine or mixed 2 a month or more.     Family History   Problem Relation Age of Onset     Hypertension Mother      Thyroid Disease Mother         hypothyroidism     Hypertension Father      Diabetes Maternal Grandfather      Thyroid Disease Daughter         hypothyroidism     Asthma No family hx of      C.A.D. No family hx of      Cerebrovascular Disease No family hx of      Breast Cancer No family hx of      Cancer - colorectal No family hx of      Prostate Cancer No family hx of          Current Outpatient Medications   Medication Sig Dispense Refill     azelastine (ASTELIN) 0.1 % nasal spray Spray 2 sprays into both nostrils 2 times daily as  needed for rhinitis 30 mL 3     fluticasone-vilanterol (BREO ELLIPTA) 200-25 MCG/INH inhaler Inhale 1 puff into the lungs daily 1 Inhaler 3     metroNIDAZOLE (FLAGYL) 500 MG tablet Take 1 tablet (500 mg) by mouth 2 times daily 14 tablet 0     montelukast (SINGULAIR) 10 MG tablet Take 1 tablet (10 mg) by mouth At Bedtime 30 tablet 3     Multiple Vitamin (MULTIVITAMIN) per tablet Take 1 tablet by mouth daily. 100 tablet 12     acetaminophen (TYLENOL) 500 MG tablet Take 1,000 mg by mouth every 6 hours as needed for mild pain       albuterol (PROAIR HFA/PROVENTIL HFA/VENTOLIN HFA) 108 (90 Base) MCG/ACT inhaler Inhale 2 puffs into the lungs every 4 hours as needed for shortness of breath / dyspnea (Patient not taking: Reported on 9/17/2019) 1 Inhaler 11     EPINEPHrine (AUVI-Q) 0.3 MG/0.3ML injection 2-pack Inject 0.3 mLs (0.3 mg) into the muscle as needed for anaphylaxis 0.6 mL 2     sildenafil (REVATIO) 20 MG tablet Take 1 tablet (20 mg) by mouth three times daily for pulmonary hypertension. 30 tablet 11     valACYclovir (VALTREX) 500 MG tablet Take 2 tablets (1,000 mg) by mouth 2 times daily for 10 days 40 tablet 5     Allergies   Allergen Reactions     Advil [Ibuprofen Micronized] Other (See Comments)     Tight chest     Asa [Aspirin] Nausea and Vomiting     Asthma - tight chest     Hazelnuts [Nuts] Other (See Comments)     Chest tight. Positive SPT. Tolerates other tree nuts and peanut.     Naprosyn [Naproxen] GI Disturbance     Stomach      BP Readings from Last 3 Encounters:   09/17/19 110/64   05/06/19 133/81   04/02/19 118/76    Wt Readings from Last 3 Encounters:   09/17/19 81.2 kg (179 lb)   05/06/19 79.4 kg (175 lb)   03/19/19 80.6 kg (177 lb 12.8 oz)                      Reviewed and updated as needed this visit by Provider  Tobacco  Allergies  Meds  Problems  Med Hx  Surg Hx  Fam Hx         Review of Systems   ROS COMP: Constitutional, HEENT, cardiovascular, pulmonary, gi and gu systems are negative,  "except as otherwise noted.      Objective    /64   Pulse 51   Temp 96.8  F (36  C) (Tympanic)   Resp 16   Ht 1.727 m (5' 8\")   Wt 81.2 kg (179 lb)   SpO2 98%   BMI 27.22 kg/m    Body mass index is 27.22 kg/m .  Physical Exam   GENERAL: healthy, alert and no distress  EYES: Eyes grossly normal to inspection, PERRL and conjunctivae and sclerae normal  HENT: ear canals and TM's normal, nose and mouth without ulcers or lesions  NECK: no adenopathy, no asymmetry, masses, or scars and thyroid normal to palpation  RESP: lungs clear to auscultation - no rales, rhonchi or wheezes  CV: regular rate and rhythm, normal S1 S2, no S3 or S4, no murmur, click or rub, no peripheral edema and peripheral pulses strong  ABDOMEN: soft, nontender, no hepatosplenomegaly, no masses and bowel sounds normal  MS: no gross musculoskeletal defects noted, no edema    Diagnostic Test Results:  Pending         Assessment & Plan       ICD-10-CM    1. Screening for STD (sexually transmitted disease) Z11.3 Hepatitis B surface antigen     HIV Antigen Antibody Combo     Treponema Abs w Reflex to RPR and Titer     **Hepatitis C Screen Reflex to RNA FUTURE anytime     NEISSERIA GONORRHOEA PCR     CHLAMYDIA TRACHOMATIS PCR   2. Screen for colon cancer Z12.11 Fecal colorectal cancer screen (FIT)   3. Exposure to STD Z20.2 metroNIDAZOLE (FLAGYL) 500 MG tablet   Patient will be notified of results         FUTURE APPOINTMENTS:       - Follow-up visit in one month or sooner as needed.    Return in about 4 weeks (around 10/15/2019).    Wilmar Johnston MD  [facility]      "

## 2019-09-17 NOTE — PATIENT INSTRUCTIONS
Thank you for choosing [facility].  You may be receiving an email and/or telephone survey request from [facility] [employer] regarding your visit today.  Please take a few minutes to respond to the survey to let us know how we are doing.      If you have questions or concerns, please contact us via [employer] or you can contact your care team at [phone].    Our Clinic hours are:  Monday 6:40 am  to 7:00 pm  Tuesday -Friday 6:40 am to 5:00 pm    The [place] outpatient lab hours are:  Monday - Friday 6:10 am to 4:45 pm  Saturdays 7:00 am to 11:00 am  Appointments are required, call [phone]    If you have clinical questions after hours or would like to schedule an appointment,  call the clinic at [phone].

## 2019-09-18 LAB
C TRACH DNA SPEC QL NAA+PROBE: NEGATIVE
HBV SURFACE AG SERPL QL IA: NONREACTIVE
HCV AB SERPL QL IA: NONREACTIVE
HIV 1+2 AB+HIV1 P24 AG SERPL QL IA: NONREACTIVE
N GONORRHOEA DNA SPEC QL NAA+PROBE: NEGATIVE
SPECIMEN SOURCE: NORMAL
SPECIMEN SOURCE: NORMAL
T PALLIDUM AB SER QL: NONREACTIVE

## 2019-09-18 ASSESSMENT — ASTHMA QUESTIONNAIRES: ACT_TOTALSCORE: 22

## 2019-09-27 DIAGNOSIS — Z12.11 SCREEN FOR COLON CANCER: ICD-10-CM

## 2019-09-27 PROCEDURE — 82274 ASSAY TEST FOR BLOOD FECAL: CPT | Performed by: FAMILY MEDICINE

## 2019-09-30 LAB — HEMOCCULT STL QL IA: NEGATIVE

## 2019-09-30 NOTE — RESULT ENCOUNTER NOTE
Please inform patient that test result was within normal parameters.   Thank you.     Wilmar Johnston M.D.

## 2019-11-02 ENCOUNTER — HEALTH MAINTENANCE LETTER (OUTPATIENT)
Age: 54
End: 2019-11-02

## 2019-11-06 ENCOUNTER — TELEPHONE (OUTPATIENT)
Dept: OTOLARYNGOLOGY | Facility: CLINIC | Age: 54
End: 2019-11-06

## 2019-11-06 ENCOUNTER — OFFICE VISIT (OUTPATIENT)
Dept: OTOLARYNGOLOGY | Facility: CLINIC | Age: 54
End: 2019-11-06
Payer: COMMERCIAL

## 2019-11-06 VITALS
WEIGHT: 183 LBS | HEART RATE: 67 BPM | DIASTOLIC BLOOD PRESSURE: 75 MMHG | HEIGHT: 68 IN | SYSTOLIC BLOOD PRESSURE: 124 MMHG | RESPIRATION RATE: 18 BRPM | TEMPERATURE: 96.9 F | BODY MASS INDEX: 27.74 KG/M2

## 2019-11-06 DIAGNOSIS — J33.9 NASAL POLYPOSIS: ICD-10-CM

## 2019-11-06 DIAGNOSIS — J32.4 CHRONIC PANSINUSITIS: Primary | ICD-10-CM

## 2019-11-06 DIAGNOSIS — Z88.6 SAMTER'S TRIAD: ICD-10-CM

## 2019-11-06 DIAGNOSIS — Z98.890 HISTORY OF ENDOSCOPIC SINUS SURGERY: ICD-10-CM

## 2019-11-06 DIAGNOSIS — J45.909 SAMTER'S TRIAD: ICD-10-CM

## 2019-11-06 DIAGNOSIS — J45.50 SEVERE PERSISTENT ASTHMA WITHOUT COMPLICATION (H): ICD-10-CM

## 2019-11-06 DIAGNOSIS — J33.9 SAMTER'S TRIAD: ICD-10-CM

## 2019-11-06 PROCEDURE — 99214 OFFICE O/P EST MOD 30 MIN: CPT | Performed by: OTOLARYNGOLOGY

## 2019-11-06 RX ORDER — BUDESONIDE 0.5 MG/2ML
INHALANT ORAL
Qty: 360 ML | Refills: 3 | Status: SHIPPED | OUTPATIENT
Start: 2019-11-06 | End: 2021-04-13

## 2019-11-06 RX ORDER — TRIAMCINOLONE ACETONIDE 40 MG/ML
80 INJECTION, SUSPENSION INTRA-ARTICULAR; INTRAMUSCULAR ONCE
Status: COMPLETED | OUTPATIENT
Start: 2019-11-06 | End: 2019-11-06

## 2019-11-06 RX ORDER — MONTELUKAST SODIUM 10 MG/1
10 TABLET ORAL AT BEDTIME
Qty: 90 TABLET | Refills: 3 | Status: SHIPPED | OUTPATIENT
Start: 2019-11-06 | End: 2020-10-09

## 2019-11-06 RX ADMIN — TRIAMCINOLONE ACETONIDE 80 MG: 40 INJECTION, SUSPENSION INTRA-ARTICULAR; INTRAMUSCULAR at 10:43

## 2019-11-06 ASSESSMENT — MIFFLIN-ST. JEOR: SCORE: 1649.58

## 2019-11-06 NOTE — LETTER
11/6/2019         RE: Marco Stovall  [id number] [facility] [address]        Dear Colleague,    Thank you for referring your patient, Marco Stovall, to the [facility]. Please see a copy of my visit note below.    Chief Complaint   Patient presents with     Nose Problem     nasal polyps - surgery x4 on polyps on both sides     History of Present Illness   Marco Stovall is a 54 year old male who presents for nose and sinus evaluation.  The patient was last seen in August 2018 by Dr. Ji.  He has a history of chronic sinusitis with nasal polyposis, asthma, and aspirin sensitivity.  He is undergone multiple endoscopic sinus surgeries, most recent in 2014 by Dr. Yang.  He was having improvement in his symptoms with allergy management at the end of last year.  He returns today for follow-up.     From a symptom standpoint, the patient reports bilateral nasal obstruction worse on the right-hand side, chronic nasal congestion, postnasal drainage, decreased sense of taste and smell, facial pressure in the malar areas.  He had 4 previous endoscopic sinus surgeries, most recently in 2014.  His current nasal regimen includes intermittent use of nasal saline.  He has not used budesonide in the past.  He has not had Kenalog in the past.  He has used Singulair in the past.  He has not undergone aspirin desensitization in the past.    The patient previously underwent a sinus CT on 6/7/2014.  My review of the sinus CT from 6/7/2014 shows severe sinonasal polyposis bilaterally.  The patient had complete opacification of his frontals, ethmoids, and sphenoid sinuses.  There is significant osteitis of the sphenoid sinus bone.  He had near complete opacification of the bilateral maxillary sinuses.  He had grade 3 polyposis on the scan.  The CT was obviously prior to his surgical intervention in 2014 by Dr. Yang.    Past Medical History  Patient Active Problem List   Diagnosis     Screening  for hypertension     Chronic maxillary sinusitis     Hyperlipidemia LDL goal <130     Severe persistent asthma without complication     Nasal polyp     Genital herpes simplex, unspecified site     Samter's triad     Aspirin sensitivity     Seasonal allergic rhinitis due to pollen     Allergic rhinitis due to dust mite     Current Medications     Current Outpatient Medications:      acetaminophen (TYLENOL) 500 MG tablet, Take 1,000 mg by mouth every 6 hours as needed for mild pain, Disp: , Rfl:      albuterol (PROAIR HFA/PROVENTIL HFA/VENTOLIN HFA) 108 (90 Base) MCG/ACT inhaler, Inhale 2 puffs into the lungs every 4 hours as needed for shortness of breath / dyspnea, Disp: 1 Inhaler, Rfl: 11     azelastine (ASTELIN) 0.1 % nasal spray, Spray 2 sprays into both nostrils 2 times daily as needed for rhinitis, Disp: 30 mL, Rfl: 3     budesonide (PULMICORT) 0.5 MG/2ML neb solution, Place 0.5 mg/2 mL budesonide vial in 8 oz normal saline sinus rinse bottle.  Irrigate each nostril with one half of the bottle twice daily., Disp: 360 mL, Rfl: 3     EPINEPHrine (AUVI-Q) 0.3 MG/0.3ML injection 2-pack, Inject 0.3 mLs (0.3 mg) into the muscle as needed for anaphylaxis, Disp: 0.6 mL, Rfl: 2     fluticasone-vilanterol (BREO ELLIPTA) 200-25 MCG/INH inhaler, Inhale 1 puff into the lungs daily, Disp: 1 Inhaler, Rfl: 3     montelukast (SINGULAIR) 10 MG tablet, Take 1 tablet (10 mg) by mouth At Bedtime, Disp: 90 tablet, Rfl: 3     montelukast (SINGULAIR) 10 MG tablet, Take 1 tablet (10 mg) by mouth At Bedtime, Disp: 30 tablet, Rfl: 3     Multiple Vitamin (MULTIVITAMIN) per tablet, Take 1 tablet by mouth daily., Disp: 100 tablet, Rfl: 12     sildenafil (REVATIO) 20 MG tablet, Take 1 tablet (20 mg) by mouth three times daily for pulmonary hypertension., Disp: 30 tablet, Rfl: 11     metroNIDAZOLE (FLAGYL) 500 MG tablet, Take 1 tablet (500 mg) by mouth 2 times daily (Patient not taking: Reported on 11/6/2019), Disp: 14 tablet, Rfl: 0      valACYclovir (VALTREX) 500 MG tablet, Take 2 tablets (1,000 mg) by mouth 2 times daily for 10 days, Disp: 40 tablet, Rfl: 5    Current Facility-Administered Medications:      triamcinolone (KENALOG-40) injection 80 mg, 80 mg, Intramuscular, Once, Josh Swartz MD    Allergies  Allergies   Allergen Reactions     Advil [Ibuprofen Micronized] Other (See Comments)     Tight chest     Asa [Aspirin] Nausea and Vomiting     Asthma - tight chest     Hazelnuts [Nuts] Other (See Comments)     Chest tight. Positive SPT. Tolerates other tree nuts and peanut.     Naprosyn [Naproxen] GI Disturbance     Stomach        Social History   Social History     Socioeconomic History     Marital status:      Spouse name: Not on file     Number of children: Not on file     Years of education: Not on file     Highest education level: Not on file   Occupational History     Not on file   Social Needs     Financial resource strain: Not on file     Food insecurity:     Worry: Not on file     Inability: Not on file     Transportation needs:     Medical: Not on file     Non-medical: Not on file   Tobacco Use     Smoking status: Never Smoker     Smokeless tobacco: Never Used   Substance and Sexual Activity     Alcohol use: Yes     Comment: wine or mixed 2 a month or more.     Drug use: No     Sexual activity: Yes     Partners: Female   Lifestyle     Physical activity:     Days per week: Not on file     Minutes per session: Not on file     Stress: Not on file   Relationships     Social connections:     Talks on phone: Not on file     Gets together: Not on file     Attends [employer] service: Not on file     Active member of club or organization: Not on file     Attends meetings of clubs or organizations: Not on file     Relationship status: Not on file     Intimate partner violence:     Fear of current or ex partner: Not on file     Emotionally abused: Not on file     Physically abused: Not on file     Forced sexual activity: Not on file  "  Other Topics Concern     Parent/sibling w/ CABG, MI or angioplasty before 65F 55M? No      Service No     Blood Transfusions No     Caffeine Concern Yes     Comment: 2 cups a day     Occupational Exposure No     Hobby Hazards Yes     Comment: motorcycle     Sleep Concern No     Stress Concern No     Weight Concern No     Special Diet Yes     Comment: multi vit     Back Care No     Exercise No     Bike Helmet Yes     Seat Belt Yes     Self-Exams Not Asked   Social History Narrative    September 14, 2018    ENVIRONMENTAL HISTORY: [place] The home is heated with a forced air. They do have central air conditioning. The patient's bedroom is furnished with carpeting in bedroom and allergen mattress cover.  Pets inside the house include 1 cat. There is no history of cockroach or mice infestation. There are no smokers in the house.  The house does not have a damp basement.        Family History  Family History   Problem Relation Age of Onset     Hypertension Mother      Thyroid Disease Mother         hypothyroidism     Hypertension Father      Diabetes Maternal Grandfather      Thyroid Disease Daughter         hypothyroidism     Asthma No family hx of      C.A.D. No family hx of      Cerebrovascular Disease No family hx of      Breast Cancer No family hx of      Cancer - colorectal No family hx of      Prostate Cancer No family hx of        Review of Systems  As per HPI and PMHx, otherwise 10+ comprehensive system review is negative.    Physical Exam  /75 (BP Location: Right arm, Patient Position: Chair, Cuff Size: Adult Regular)   Pulse 67   Temp 96.9  F (36.1  C) (Tympanic)   Resp 18   Ht 1.727 m (5' 8\")   Wt 83 kg (183 lb)   BMI 27.83 kg/m     GENERAL: Patient is a pleasant, cooperative 54 year old male in no acute distress.  HEAD: Normocephalic, atraumatic.  Hair and scalp are normal.  EYES: Pupils are equal, round, reactive to light and " accommodation.  Extraocular movements are intact.  The sclera nonicteric without injection.  The extraocular structures are normal.  EARS: Normal shape and symmetry.  No tenderness when palpating the mastoid or tragal areas bilaterally.    NOSE: Nares are patent.  Anterior rhinoscopy reveals near grade 4 polyposis on the left and grade 3 polyposis on the right.  No nasal cavity masses or mucopurulence  NEUROLOGIC: Cranial nerves II through XII are grossly intact.  Voice is strong.  Patient is House-Brackman I/VI bilaterally.  CARDIOVASCULAR: Extremities are warm and well-perfused.  No significant peripheral edema.  RESPIRATORY: Patient has nonlabored breathing without cough, wheeze, stridor.  PSYCHIATRIC: Patient is alert and oriented.  Mood and affect appear normal.  SKIN: Warm and dry.  No scalp, face, or neck lesions noted.    Assessment and Plan     ICD-10-CM    1. Chronic pansinusitis J32.4 triamcinolone (KENALOG-40) injection 80 mg     budesonide (PULMICORT) 0.5 MG/2ML neb solution     CT Sinus w/o Contrast     montelukast (SINGULAIR) 10 MG tablet     Case Request: Bilateral endoscopic maxillary antrostomies with tissue removal, bilateral endoscopic total ethmoidectomies, bilateral endoscopic sphenoidotomies, bilateral endoscopic frontal sinusotomies, bilateral endoscopic nasal polypectomy, candi...   2. Nasal polyposis J33.9 triamcinolone (KENALOG-40) injection 80 mg     budesonide (PULMICORT) 0.5 MG/2ML neb solution     CT Sinus w/o Contrast     montelukast (SINGULAIR) 10 MG tablet     Case Request: Bilateral endoscopic maxillary antrostomies with tissue removal, bilateral endoscopic total ethmoidectomies, bilateral endoscopic sphenoidotomies, bilateral endoscopic frontal sinusotomies, bilateral endoscopic nasal polypectomy, candi...   3. Samter's triad J45.909 triamcinolone (KENALOG-40) injection 80 mg    J33.9 budesonide (PULMICORT) 0.5 MG/2ML neb solution    Z88.6 CT Sinus w/o Contrast     montelukast  (SINGULAIR) 10 MG tablet     Case Request: Bilateral endoscopic maxillary antrostomies with tissue removal, bilateral endoscopic total ethmoidectomies, bilateral endoscopic sphenoidotomies, bilateral endoscopic frontal sinusotomies, bilateral endoscopic nasal polypectomy, candi...   4. Severe persistent asthma without complication J45.50 triamcinolone (KENALOG-40) injection 80 mg     budesonide (PULMICORT) 0.5 MG/2ML neb solution     CT Sinus w/o Contrast     montelukast (SINGULAIR) 10 MG tablet     Case Request: Bilateral endoscopic maxillary antrostomies with tissue removal, bilateral endoscopic total ethmoidectomies, bilateral endoscopic sphenoidotomies, bilateral endoscopic frontal sinusotomies, bilateral endoscopic nasal polypectomy, candi...   5. History of endoscopic sinus surgery Z98.890 triamcinolone (KENALOG-40) injection 80 mg     budesonide (PULMICORT) 0.5 MG/2ML neb solution     CT Sinus w/o Contrast     montelukast (SINGULAIR) 10 MG tablet     Case Request: Bilateral endoscopic maxillary antrostomies with tissue removal, bilateral endoscopic total ethmoidectomies, bilateral endoscopic sphenoidotomies, bilateral endoscopic frontal sinusotomies, bilateral endoscopic nasal polypectomy, candi...     It was my pleasure seeing Marco Stovall today in clinic.  The patient has chronic sinusitis with nasal polyposis.  We discussed the pathophysiology of chronic sinusitis.  We discussed medical and surgical management.  I feel the patient would benefit from nasal saline irrigations with Budesonide.  We discussed proper nasal saline irrigation technique with Budesonide.  The patient would also benefit from an 80 mg intramuscular injection of Kenalog injection today in office.  We discussed the risks, benefits, options, goals of a intramuscular Kenalog injection today in office including, but not limited to: Risk of pain at injection site, risk of infection, side effects of systemic steroids including blood pressure  problems, insulin resistance, weight gain, bone/joint issues, mood alteration.  Patient voiced understanding and is willing to proceed.    Given the patient's significant polyposis on examination, I think that he would benefit from endoscopic sinus surgery.  He also would likely benefit from postoperative aspirin to sensitization.  I will contact allergy to help me manage and figure out his postoperative aspirin desensitization.  I will obtain a CT scan of his sinuses for preoperative planning.    Josh Swartz MD  Department of Otolarygology-Head and Neck Surgery  [facility] Administered Medication Documentation    MEDICATION LIST:   Injectable Medication Documentation    Patient was given Triamcinolone acetonide. Prior to medication administration, verified patients identity using patient s name and date of birth. Please see MAR and medication order for additional information. Patient instructed to report any adverse reaction to staff immediately .      Was entire vial of medication used? Yes  Vial/Syringe: Single dose vial x2  Expiration Date:  04/2021  Was this medication supplied by the patient? No   Vidhya Beltre CMA        Again, thank you for allowing me to participate in the care of your patient.        Sincerely,        Josh Swartz MD

## 2019-11-06 NOTE — PROGRESS NOTES
Chief Complaint   Patient presents with     Nose Problem     nasal polyps - surgery x4 on polyps on both sides     History of Present Illness   Marco Stovall is a 54 year old male who presents for nose and sinus evaluation.  The patient was last seen in August 2018 by Dr. Ji.  He has a history of chronic sinusitis with nasal polyposis, asthma, and aspirin sensitivity.  He is undergone multiple endoscopic sinus surgeries, most recent in 2014 by Dr. Yang.  He was having improvement in his symptoms with allergy management at the end of last year.  He returns today for follow-up.     From a symptom standpoint, the patient reports bilateral nasal obstruction worse on the right-hand side, chronic nasal congestion, postnasal drainage, decreased sense of taste and smell, facial pressure in the malar areas.  He had 4 previous endoscopic sinus surgeries, most recently in 2014.  His current nasal regimen includes intermittent use of nasal saline.  He has not used budesonide in the past.  He has not had Kenalog in the past.  He has used Singulair in the past.  He has not undergone aspirin desensitization in the past.    The patient previously underwent a sinus CT on 6/7/2014.  My review of the sinus CT from 6/7/2014 shows severe sinonasal polyposis bilaterally.  The patient had complete opacification of his frontals, ethmoids, and sphenoid sinuses.  There is significant osteitis of the sphenoid sinus bone.  He had near complete opacification of the bilateral maxillary sinuses.  He had grade 3 polyposis on the scan.  The CT was obviously prior to his surgical intervention in 2014 by Dr. Yang.    Past Medical History  Patient Active Problem List   Diagnosis     Screening for hypertension     Chronic maxillary sinusitis     Hyperlipidemia LDL goal <130     Severe persistent asthma without complication     Nasal polyp     Genital herpes simplex, unspecified site     Samter's triad     Aspirin sensitivity      Seasonal allergic rhinitis due to pollen     Allergic rhinitis due to dust mite     Current Medications     Current Outpatient Medications:      acetaminophen (TYLENOL) 500 MG tablet, Take 1,000 mg by mouth every 6 hours as needed for mild pain, Disp: , Rfl:      albuterol (PROAIR HFA/PROVENTIL HFA/VENTOLIN HFA) 108 (90 Base) MCG/ACT inhaler, Inhale 2 puffs into the lungs every 4 hours as needed for shortness of breath / dyspnea, Disp: 1 Inhaler, Rfl: 11     azelastine (ASTELIN) 0.1 % nasal spray, Spray 2 sprays into both nostrils 2 times daily as needed for rhinitis, Disp: 30 mL, Rfl: 3     budesonide (PULMICORT) 0.5 MG/2ML neb solution, Place 0.5 mg/2 mL budesonide vial in 8 oz normal saline sinus rinse bottle.  Irrigate each nostril with one half of the bottle twice daily., Disp: 360 mL, Rfl: 3     EPINEPHrine (AUVI-Q) 0.3 MG/0.3ML injection 2-pack, Inject 0.3 mLs (0.3 mg) into the muscle as needed for anaphylaxis, Disp: 0.6 mL, Rfl: 2     fluticasone-vilanterol (BREO ELLIPTA) 200-25 MCG/INH inhaler, Inhale 1 puff into the lungs daily, Disp: 1 Inhaler, Rfl: 3     montelukast (SINGULAIR) 10 MG tablet, Take 1 tablet (10 mg) by mouth At Bedtime, Disp: 90 tablet, Rfl: 3     montelukast (SINGULAIR) 10 MG tablet, Take 1 tablet (10 mg) by mouth At Bedtime, Disp: 30 tablet, Rfl: 3     Multiple Vitamin (MULTIVITAMIN) per tablet, Take 1 tablet by mouth daily., Disp: 100 tablet, Rfl: 12     sildenafil (REVATIO) 20 MG tablet, Take 1 tablet (20 mg) by mouth three times daily for pulmonary hypertension., Disp: 30 tablet, Rfl: 11     metroNIDAZOLE (FLAGYL) 500 MG tablet, Take 1 tablet (500 mg) by mouth 2 times daily (Patient not taking: Reported on 11/6/2019), Disp: 14 tablet, Rfl: 0     valACYclovir (VALTREX) 500 MG tablet, Take 2 tablets (1,000 mg) by mouth 2 times daily for 10 days, Disp: 40 tablet, Rfl: 5    Current Facility-Administered Medications:      triamcinolone (KENALOG-40) injection 80 mg, 80 mg, Intramuscular,  Once, Josh Swartz MD    Allergies  Allergies   Allergen Reactions     Advil [Ibuprofen Micronized] Other (See Comments)     Tight chest     Asa [Aspirin] Nausea and Vomiting     Asthma - tight chest     Hazelnuts [Nuts] Other (See Comments)     Chest tight. Positive SPT. Tolerates other tree nuts and peanut.     Naprosyn [Naproxen] GI Disturbance     Stomach        Social History   Social History     Socioeconomic History     Marital status:      Spouse name: Not on file     Number of children: Not on file     Years of education: Not on file     Highest education level: Not on file   Occupational History     Not on file   Social Needs     Financial resource strain: Not on file     Food insecurity:     Worry: Not on file     Inability: Not on file     Transportation needs:     Medical: Not on file     Non-medical: Not on file   Tobacco Use     Smoking status: Never Smoker     Smokeless tobacco: Never Used   Substance and Sexual Activity     Alcohol use: Yes     Comment: wine or mixed 2 a month or more.     Drug use: No     Sexual activity: Yes     Partners: Female   Lifestyle     Physical activity:     Days per week: Not on file     Minutes per session: Not on file     Stress: Not on file   Relationships     Social connections:     Talks on phone: Not on file     Gets together: Not on file     Attends Rastafari service: Not on file     Active member of club or organization: Not on file     Attends meetings of clubs or organizations: Not on file     Relationship status: Not on file     Intimate partner violence:     Fear of current or ex partner: Not on file     Emotionally abused: Not on file     Physically abused: Not on file     Forced sexual activity: Not on file   Other Topics Concern     Parent/sibling w/ CABG, MI or angioplasty before 65F 55M? No      Service No     Blood Transfusions No     Caffeine Concern Yes     Comment: 2 cups a day     Occupational Exposure No     Hobby Hazards Yes      "Comment: motorcycle     Sleep Concern No     Stress Concern No     Weight Concern No     Special Diet Yes     Comment: multi vit     Back Care No     Exercise No     Bike Helmet Yes     Seat Belt Yes     Self-Exams Not Asked   Social History Narrative    September 14, 2018    ENVIRONMENTAL HISTORY: [place] The home is heated with a forced air. They do have central air conditioning. The patient's bedroom is furnished with carpeting in bedroom and allergen mattress cover.  Pets inside the house include 1 cat. There is no history of cockroach or mice infestation. There are no smokers in the house.  The house does not have a damp basement.        Family History  Family History   Problem Relation Age of Onset     Hypertension Mother      Thyroid Disease Mother         hypothyroidism     Hypertension Father      Diabetes Maternal Grandfather      Thyroid Disease Daughter         hypothyroidism     Asthma No family hx of      C.A.D. No family hx of      Cerebrovascular Disease No family hx of      Breast Cancer No family hx of      Cancer - colorectal No family hx of      Prostate Cancer No family hx of        Review of Systems  As per HPI and PMHx, otherwise 10+ comprehensive system review is negative.    Physical Exam  /75 (BP Location: Right arm, Patient Position: Chair, Cuff Size: Adult Regular)   Pulse 67   Temp 96.9  F (36.1  C) (Tympanic)   Resp 18   Ht 1.727 m (5' 8\")   Wt 83 kg (183 lb)   BMI 27.83 kg/m    GENERAL: Patient is a pleasant, cooperative 54 year old male in no acute distress.  HEAD: Normocephalic, atraumatic.  Hair and scalp are normal.  EYES: Pupils are equal, round, reactive to light and accommodation.  Extraocular movements are intact.  The sclera nonicteric without injection.  The extraocular structures are normal.  EARS: Normal shape and symmetry.  No tenderness when palpating the mastoid or tragal areas bilaterally.    NOSE: Nares are " patent.  Anterior rhinoscopy reveals near grade 4 polyposis on the left and grade 3 polyposis on the right.  No nasal cavity masses or mucopurulence  NEUROLOGIC: Cranial nerves II through XII are grossly intact.  Voice is strong.  Patient is House-Brackman I/VI bilaterally.  CARDIOVASCULAR: Extremities are warm and well-perfused.  No significant peripheral edema.  RESPIRATORY: Patient has nonlabored breathing without cough, wheeze, stridor.  PSYCHIATRIC: Patient is alert and oriented.  Mood and affect appear normal.  SKIN: Warm and dry.  No scalp, face, or neck lesions noted.    Assessment and Plan     ICD-10-CM    1. Chronic pansinusitis J32.4 triamcinolone (KENALOG-40) injection 80 mg     budesonide (PULMICORT) 0.5 MG/2ML neb solution     CT Sinus w/o Contrast     montelukast (SINGULAIR) 10 MG tablet     Case Request: Bilateral endoscopic maxillary antrostomies with tissue removal, bilateral endoscopic total ethmoidectomies, bilateral endoscopic sphenoidotomies, bilateral endoscopic frontal sinusotomies, bilateral endoscopic nasal polypectomy, candi...   2. Nasal polyposis J33.9 triamcinolone (KENALOG-40) injection 80 mg     budesonide (PULMICORT) 0.5 MG/2ML neb solution     CT Sinus w/o Contrast     montelukast (SINGULAIR) 10 MG tablet     Case Request: Bilateral endoscopic maxillary antrostomies with tissue removal, bilateral endoscopic total ethmoidectomies, bilateral endoscopic sphenoidotomies, bilateral endoscopic frontal sinusotomies, bilateral endoscopic nasal polypectomy, candi...   3. Samter's triad J45.909 triamcinolone (KENALOG-40) injection 80 mg    J33.9 budesonide (PULMICORT) 0.5 MG/2ML neb solution    Z88.6 CT Sinus w/o Contrast     montelukast (SINGULAIR) 10 MG tablet     Case Request: Bilateral endoscopic maxillary antrostomies with tissue removal, bilateral endoscopic total ethmoidectomies, bilateral endoscopic sphenoidotomies, bilateral endoscopic frontal sinusotomies, bilateral endoscopic nasal  polypectomy, candi...   4. Severe persistent asthma without complication J45.50 triamcinolone (KENALOG-40) injection 80 mg     budesonide (PULMICORT) 0.5 MG/2ML neb solution     CT Sinus w/o Contrast     montelukast (SINGULAIR) 10 MG tablet     Case Request: Bilateral endoscopic maxillary antrostomies with tissue removal, bilateral endoscopic total ethmoidectomies, bilateral endoscopic sphenoidotomies, bilateral endoscopic frontal sinusotomies, bilateral endoscopic nasal polypectomy, candi...   5. History of endoscopic sinus surgery Z98.890 triamcinolone (KENALOG-40) injection 80 mg     budesonide (PULMICORT) 0.5 MG/2ML neb solution     CT Sinus w/o Contrast     montelukast (SINGULAIR) 10 MG tablet     Case Request: Bilateral endoscopic maxillary antrostomies with tissue removal, bilateral endoscopic total ethmoidectomies, bilateral endoscopic sphenoidotomies, bilateral endoscopic frontal sinusotomies, bilateral endoscopic nasal polypectomy, candi...     It was my pleasure seeing Marco Stovall today in clinic.  The patient has chronic sinusitis with nasal polyposis.  We discussed the pathophysiology of chronic sinusitis.  We discussed medical and surgical management.  I feel the patient would benefit from nasal saline irrigations with Budesonide.  We discussed proper nasal saline irrigation technique with Budesonide.  The patient would also benefit from an 80 mg intramuscular injection of Kenalog injection today in office.  We discussed the risks, benefits, options, goals of a intramuscular Kenalog injection today in office including, but not limited to: Risk of pain at injection site, risk of infection, side effects of systemic steroids including blood pressure problems, insulin resistance, weight gain, bone/joint issues, mood alteration.  Patient voiced understanding and is willing to proceed.    Given the patient's significant polyposis on examination, I think that he would benefit from endoscopic sinus surgery.   He also would likely benefit from postoperative aspirin to sensitization.  I will contact allergy to help me manage and figure out his postoperative aspirin desensitization.  I will obtain a CT scan of his sinuses for preoperative planning.    We discussed the risks, benefits, alternatives, options of bilateral endoscopic maxillary antrostomies with tissue removal, bilateral endoscopic total laminectomies, bilateral endoscopic sphenoidotomies, bilateral endoscopic frontal sinusotomies, bilateral endoscopic nasal polypectomy, image guidance including, but not limited to: Risk of bleeding, risk of infection, risk of postoperative pain, risk of injury to the orbital contents, risk of CSF leak, risk of intranasal scarring or adhesions, risk of smell disturbance or smell loss, potential need for additional procedures, risk of general anesthesia.  The patient voiced understanding and is willing to proceed.  We discussed the postoperative course and convalescence including lifting and activity restrictions, nasal saline irrigations postoperatively, and postoperative debridement.    Josh Swartz MD  Department of Otolarygology-Head and Neck Surgery  [facility]

## 2019-11-06 NOTE — PROGRESS NOTES
Clinic Administered Medication Documentation    MEDICATION LIST:   Injectable Medication Documentation    Patient was given Triamcinolone acetonide. Prior to medication administration, verified patients identity using patient s name and date of birth. Please see MAR and medication order for additional information. Patient instructed to report any adverse reaction to staff immediately .      Was entire vial of medication used? Yes  Vial/Syringe: Single dose vial x2  Expiration Date:  04/2021  Was this medication supplied by the patient? No   Vidhya Beltre CMA

## 2019-11-06 NOTE — PATIENT INSTRUCTIONS
Per physician instructions.    If you have questions or concerns on any instructions given to you by your provider today or if you need to schedule an appointment, you can reach us at [phone].    Thank you!      BUDESONIDE IRRIGATION INSTRUCTIONS    You will be starting Budesonide nasal irrigations and will need to obtain the following:      - NeilMed Sinus Rinse 8 oz Kit  - Distilled or filtered water   - Normal saline salt packets  - Budesonide medication     Place filtered or distilled water into the NeilMed bottle up to the fill line (DO NOT USE TAP OR WELL WATER).  Place the pre-made salt packet in the 8 oz of saline.  Then placed the budesonide medication into the bottle.  Shake the bottle to suspend into solution.  Lean head forward over a sink or a basin.  Rinse each side of the nose with one-half of the bottle (each squeeze is about one-half of the bottle). Rinse the nose twice daily.     You will follow up with your ENT surgeon in 8-12 weeks to recheck your symptoms.  If you are having problems with your irrigations or problems obtaining the medication, please contact your ENT surgeon.    Video example: [email or web address]

## 2019-11-06 NOTE — TELEPHONE ENCOUNTER
Type of surgery: bilateral endoscopic maxillary antrostomies with tissue removal, bilateal endoscopic total ethmoidectomies, bilateal endoscopic sphenoidotomies,bilateral endoscopic frontal sinusotomies,bilateral endoscopic nasal polypectomy, image guidance bilateral   CPT 79731, 11574, 72130, 40138, 57155   Chronic pansinusitis J32.4  Location of surgery: [facility]  Date and time of surgery: 12/03/2019 / 7:30 am   Surgeon: DEYA Swartz  Pre-Op Appt Date: 11/18/2019  Post-Op Appt Date: 12/17/2019   Packet sent out: Yes  Pre-cert/Authorization completed:  No prior auth needed, per [employer] of [place] online list  Date: 11/07/2019    Thank you,   Missy Edge   [facility] Department  [phone]

## 2019-11-14 ENCOUNTER — HOSPITAL ENCOUNTER (OUTPATIENT)
Dept: CT IMAGING | Facility: CLINIC | Age: 54
Discharge: HOME | End: 2019-11-14
Attending: OTOLARYNGOLOGY | Admitting: OTOLARYNGOLOGY
Payer: COMMERCIAL

## 2019-11-14 DIAGNOSIS — J45.909 SAMTER'S TRIAD: ICD-10-CM

## 2019-11-14 DIAGNOSIS — Z88.6 SAMTER'S TRIAD: ICD-10-CM

## 2019-11-14 DIAGNOSIS — J33.9 NASAL POLYPOSIS: ICD-10-CM

## 2019-11-14 DIAGNOSIS — J45.50 SEVERE PERSISTENT ASTHMA WITHOUT COMPLICATION (H): ICD-10-CM

## 2019-11-14 DIAGNOSIS — J32.4 CHRONIC PANSINUSITIS: ICD-10-CM

## 2019-11-14 DIAGNOSIS — J33.9 SAMTER'S TRIAD: ICD-10-CM

## 2019-11-14 DIAGNOSIS — Z98.890 HISTORY OF ENDOSCOPIC SINUS SURGERY: ICD-10-CM

## 2019-11-14 PROCEDURE — 70486 CT MAXILLOFACIAL W/O DYE: CPT

## 2019-11-18 ENCOUNTER — OFFICE VISIT (OUTPATIENT)
Dept: FAMILY MEDICINE | Facility: CLINIC | Age: 54
End: 2019-11-18
Payer: COMMERCIAL

## 2019-11-18 VITALS
TEMPERATURE: 97.1 F | BODY MASS INDEX: 27.28 KG/M2 | RESPIRATION RATE: 16 BRPM | HEIGHT: 68 IN | HEART RATE: 56 BPM | OXYGEN SATURATION: 97 % | DIASTOLIC BLOOD PRESSURE: 78 MMHG | SYSTOLIC BLOOD PRESSURE: 130 MMHG | WEIGHT: 180 LBS

## 2019-11-18 DIAGNOSIS — Z01.818 PREOP GENERAL PHYSICAL EXAM: Primary | ICD-10-CM

## 2019-11-18 DIAGNOSIS — J32.4 CHRONIC PANSINUSITIS: Primary | ICD-10-CM

## 2019-11-18 DIAGNOSIS — Z88.6 SAMTER'S TRIAD: ICD-10-CM

## 2019-11-18 DIAGNOSIS — J33.9 NASAL POLYPOSIS: ICD-10-CM

## 2019-11-18 DIAGNOSIS — Z98.890 HISTORY OF ENDOSCOPIC SINUS SURGERY: ICD-10-CM

## 2019-11-18 DIAGNOSIS — J45.50 SEVERE PERSISTENT ASTHMA WITHOUT COMPLICATION (H): ICD-10-CM

## 2019-11-18 DIAGNOSIS — J33.9 SAMTER'S TRIAD: ICD-10-CM

## 2019-11-18 DIAGNOSIS — J45.909 SAMTER'S TRIAD: ICD-10-CM

## 2019-11-18 LAB
BASOPHILS # BLD AUTO: 0 10E9/L (ref 0–0.2)
BASOPHILS NFR BLD AUTO: 0.4 %
CREAT SERPL-MCNC: 0.85 MG/DL (ref 0.66–1.25)
DIFFERENTIAL METHOD BLD: NORMAL
EOSINOPHIL # BLD AUTO: 0.2 10E9/L (ref 0–0.7)
EOSINOPHIL NFR BLD AUTO: 2.1 %
ERYTHROCYTE [DISTWIDTH] IN BLOOD BY AUTOMATED COUNT: 13 % (ref 10–15)
GFR SERPL CREATININE-BSD FRML MDRD: >90 ML/MIN/{1.73_M2}
HCT VFR BLD AUTO: 43.9 % (ref 40–53)
HGB BLD-MCNC: 14.9 G/DL (ref 13.3–17.7)
LYMPHOCYTES # BLD AUTO: 2.7 10E9/L (ref 0.8–5.3)
LYMPHOCYTES NFR BLD AUTO: 31.7 %
MCH RBC QN AUTO: 30.9 PG (ref 26.5–33)
MCHC RBC AUTO-ENTMCNC: 33.9 G/DL (ref 31.5–36.5)
MCV RBC AUTO: 91 FL (ref 78–100)
MONOCYTES # BLD AUTO: 1 10E9/L (ref 0–1.3)
MONOCYTES NFR BLD AUTO: 11.6 %
NEUTROPHILS # BLD AUTO: 4.6 10E9/L (ref 1.6–8.3)
NEUTROPHILS NFR BLD AUTO: 54.2 %
PLATELET # BLD AUTO: 170 10E9/L (ref 150–450)
RBC # BLD AUTO: 4.82 10E12/L (ref 4.4–5.9)
WBC # BLD AUTO: 8.4 10E9/L (ref 4–11)

## 2019-11-18 PROCEDURE — 93000 ELECTROCARDIOGRAM COMPLETE: CPT | Performed by: FAMILY MEDICINE

## 2019-11-18 PROCEDURE — 82565 ASSAY OF CREATININE: CPT | Performed by: FAMILY MEDICINE

## 2019-11-18 PROCEDURE — 85025 COMPLETE CBC W/AUTO DIFF WBC: CPT | Performed by: FAMILY MEDICINE

## 2019-11-18 PROCEDURE — 36415 COLL VENOUS BLD VENIPUNCTURE: CPT | Performed by: FAMILY MEDICINE

## 2019-11-18 PROCEDURE — 99214 OFFICE O/P EST MOD 30 MIN: CPT | Performed by: FAMILY MEDICINE

## 2019-11-18 ASSESSMENT — MIFFLIN-ST. JEOR: SCORE: 1635.97

## 2019-11-18 NOTE — LETTER
My Asthma Action Plan    Name: Marco Stovall   YOB: 1965  Date: 11/18/2019   My doctor: Andrew Andrade MD   My clinic: [facility]        My Control Medicine: Fluticasone furoate + vilanterol (Breo Ellipta)  -  200/25mcg One puff daily.  Montelukast (Singulair) -  10 mg One daily.  My Rescue Medicine: Albuterol (Proair/Ventolin/Proventil HFA) 2-4 puffs EVERY 4 HOURS as needed. Use a spacer if recommended by your provider.  Pulmicort Nebulizer as needed.   My Asthma Severity:   Severe Persistent  Know your asthma triggers: A list of triggers is enclosed with your letter.               GREEN ZONE   Good Control    I feel good    No cough or wheeze    Can work, sleep and play without asthma symptoms       Take your asthma control medicine every day.     1. If exercise triggers your asthma, take your rescue medication    15 minutes before exercise or sports, and    During exercise if you have asthma symptoms  2. Spacer to use with inhaler: If you have a spacer, make sure to use it with your inhaler             YELLOW ZONE Getting Worse  I have ANY of these:    I do not feel good    Cough or wheeze    Chest feels tight    Wake up at night   1. Keep taking your Green Zone medications  2. Start taking your rescue medicine:    every 20 minutes for up to 1 hour. Then every 4 hours for 24-48 hours.  3. If you stay in the Yellow Zone for more than 12-24 hours, contact your doctor.  4. If you do not return to the Green Zone in 12-24 hours or you get worse, start taking your oral steroid medicine if prescribed by your provider.           RED ZONE Medical Alert - Get Help  I have ANY of these:    I feel awful    Medicine is not helping    Breathing getting harder    Trouble walking or talking    Nose opens wide to breathe       1. Take your rescue medicine NOW  2. If your provider has prescribed an oral steroid medicine, start taking it NOW  3. Call your doctor NOW  4. If you are still in  the Red Zone after 20 minutes and you have not reached your doctor:    Take your rescue medicine again and    Call 911 or go to the emergency room right away    See your regular doctor within 2 weeks of an Emergency Room or Urgent Care visit for follow-up treatment.          Annual Reminders:  Meet with Asthma Educator,  Flu Shot in the Fall, consider Pneumonia Vaccination for patients with asthma (aged 19 and older).    Pharmacy:    [facility] - [place], [place] [email or web address] - [place], [place] - [address]                          Asthma Triggers  How To Control Things That Make Your Asthma Worse    Triggers are things that make your asthma worse.  Look at the list below to help you find your triggers and what you can do about them.  You can help prevent asthma flare-ups by staying away from your triggers.      Trigger                                                          What you can do   Cigarette Smoke  Tobacco smoke can make asthma worse. Do not allow smoking in your home, car or around you.  Be sure no one smokes at a child s day care or school.  If you smoke, ask your health care provider for ways to help you quit.  Ask family members to quit too.  Ask your health care provider for a referral to Quit Plan to help you quit smoking, or call 2-965-749-PLAN.     Colds, Flu, Bronchitis  These are common triggers of asthma. Wash your hands often.  Don t touch your eyes, nose or mouth.  Get a flu shot every year.     Dust Mites  These are tiny bugs that live in cloth or carpet. They are too small to see. Wash sheets and blankets in hot water every week.   Encase pillows and mattress in dust mite proof covers.  Avoid having carpet if you can. If you have carpet, vacuum weekly.   Use a dust mask and HEPA vacuum.   Pollen and Outdoor Mold  Some people are allergic to trees, grass, or weed pollen, or molds. Try to keep your windows closed.  Limit time out doors when pollen  count is high.   Ask you health care provider about taking medicine during allergy season.     Animal Dander  Some people are allergic to skin flakes, urine or saliva from pets with fur or feathers. Keep pets with fur or feathers out of your home.    If you can t keep the pet outdoors, then keep the pet out of your bedroom.  Keep the bedroom door closed.  Keep pets off cloth furniture and away from stuffed toys.     Mice, Rats, and Cockroaches   Some people are allergic to the waste from these pests.   Cover food and garbage.  Clean up spills and food crumbs.  Store grease in the refrigerator.   Keep food out of the bedroom.   Indoor Mold  This can be a trigger if your home has high moisture. Fix leaking faucets, pipes, or other sources of water.   Clean moldy surfaces.  Dehumidify basement if it is damp and smelly.   Smoke, Strong Odors, and Sprays  These can reduce air quality. Stay away from strong odors and sprays, such as perfume, powder, hair spray, paints, smoke incense, paint, cleaning products, candles and new carpet.   Exercise or Sports  Some people with asthma have this trigger. Be active!  Ask your doctor about taking medicine before sports or exercise to prevent symptoms.    Warm up for 5-10 minutes before and after sports or exercise.     Other Triggers of Asthma  Cold air:  Cover your nose and mouth with a scarf.  Sometimes laughing or crying can be a trigger.  Some medicines and food can trigger asthma.

## 2019-11-18 NOTE — LETTER
November 20, 2019      Marco Stovall  [address] [place] [id number]        Dear ,    We are writing to inform you of your test results.    Please inform the patient of this normal test     Resulted Orders   CBC with platelets differential   Result Value Ref Range    WBC 8.4 4.0 - 11.0 10e9/L    RBC Count 4.82 4.4 - 5.9 10e12/L    Hemoglobin 14.9 13.3 - 17.7 g/dL    Hematocrit 43.9 40.0 - 53.0 %    MCV 91 78 - 100 fl    MCH 30.9 26.5 - 33.0 pg    MCHC 33.9 31.5 - 36.5 g/dL    RDW 13.0 10.0 - 15.0 %    Platelet Count 170 150 - 450 10e9/L    % Neutrophils 54.2 %    % Lymphocytes 31.7 %    % Monocytes 11.6 %    % Eosinophils 2.1 %    % Basophils 0.4 %    Absolute Neutrophil 4.6 1.6 - 8.3 10e9/L    Absolute Lymphocytes 2.7 0.8 - 5.3 10e9/L    Absolute Monocytes 1.0 0.0 - 1.3 10e9/L    Absolute Eosinophils 0.2 0.0 - 0.7 10e9/L    Absolute Basophils 0.0 0.0 - 0.2 10e9/L    Diff Method Automated Method    Creatinine   Result Value Ref Range    Creatinine 0.85 0.66 - 1.25 mg/dL    GFR Estimate >90 >60 mL/min/[1.73_m2]      Comment:      Non  GFR Calc  Starting 12/18/2018, serum creatinine based estimated GFR (eGFR) will be   calculated using the Chronic Kidney Disease Epidemiology Collaboration   (CKD-EPI) equation.      GFR Estimate If Black >90 >60 mL/min/[1.73_m2]      Comment:       GFR Calc  Starting 12/18/2018, serum creatinine based estimated GFR (eGFR) will be   calculated using the Chronic Kidney Disease Epidemiology Collaboration   (CKD-EPI) equation.         If you have any questions or concerns, please call the clinic at the number listed above.       Sincerely,        Andrew Andrade MD

## 2019-11-18 NOTE — PROGRESS NOTES
[facility]  [address] [address]  [phone]  Dept: [phone]    PRE-OP EVALUATION:  Today's date: 2019    Marco Stovall (: 1965) presents for pre-operative evaluation assessment as requested by Dr. Swartz.  He requires evaluation and anesthesia risk assessment prior to undergoing surgery/procedure for treatment of Chronic pansinusitis, nasal polyposis, Samter's triad.Proposed Surgery/ Procedure: Bilateral endoscopic maxillary antrostomies with tissue removal, bilateral endoscopic total ethmoidectomies, bilateral endoscopic sphenoidotomies, bilateral endoscopic frontal sinusotomies, bilateral endoscopic nasal polypectomy.     Date of Surgery/ Procedure: 12-3-19  Time of Surgery/ Procedure: 6:30 am per patient.  Hospital/Surgical Facility: [facility]  Fax number for surgical facility: No fax needed, able to view in [employer].  Primary Physician: Andrew Andrade  Type of Anesthesia Anticipated: General    Patient has a Health Care Directive or Living Will:  NO    1. NO - Do you have a history of heart attack, stroke, stent, bypass or surgery on an artery in the head, neck, heart or legs?  2. NO - Do you ever have any pain or discomfort in your chest?  3. NO - Do you have a history of  Heart Failure?  4. NO - Are you troubled by shortness of breath when: walking on the level, up a slight hill or at night?  5. NO - Do you currently have a cold, bronchitis or other respiratory infection?  6. NO - Do you have a cough, shortness of breath or wheezing?  7. NO - Do you sometimes get pains in the calves of your legs when you walk?  8. NO - Do you or anyone in your family have previous history of blood clots?  9. NO - Do you or does anyone in your family have a serious bleeding problem such as prolonged bleeding following surgeries or cuts?  10. NO - Have you ever had problems with anemia or been told to take iron pills?  11. NO - Have you had any  abnormal blood loss such as black, tarry or bloody stools, or abnormal vaginal bleeding?  12. NO - Have you ever had a blood transfusion?  13. NO - Have you or any of your relatives ever had problems with anesthesia?  14. NO - Do you have sleep apnea, excessive snoring or daytime drowsiness?  15. NO - Do you have any prosthetic heart valves?  16. NO - Do you have prosthetic joints?      HPI:     HPI related to upcoming procedure: see above.       See problem list for active medical problems.  Problems all longstanding and stable, except as noted/documented.  See ROS for pertinent symptoms related to these conditions.      MEDICAL HISTORY:     Patient Active Problem List    Diagnosis Date Noted     Chronic pansinusitis 11/06/2019     Priority: Medium     Added automatically from request for surgery [id number]       Nasal polyposis 11/06/2019     Priority: Medium     Added automatically from request for surgery [id number]       History of endoscopic sinus surgery 11/06/2019     Priority: Medium     Added automatically from request for surgery [id number]       Seasonal allergic rhinitis due to pollen 10/27/2018     Priority: Medium     Percutaneous skin puncture testing for aeroallergens performed on October 26, 2018 showed sensitivity to dust mite (Dermatophagoides pteronyssinus), grass pollen (Azeem grass and grass mix #7) tree pollen (Hackberry, and oak), weed pollen( ragweed mix, Russian thistle) and Alternaria mold.       Allergic rhinitis due to dust mite 10/27/2018     Priority: Medium     Aspirin sensitivity 10/26/2018     Priority: Medium     Samter's triad 09/14/2018     Priority: Medium     Nasal polyp 08/22/2018     Priority: Medium     Genital herpes simplex, unspecified site 08/22/2018     Priority: Medium     Severe persistent asthma without complication 03/24/2016     Priority: Medium     Hyperlipidemia LDL goal <130 07/20/2011     Priority: Medium     , July, 2011.        Chronic maxillary sinusitis  07/15/2011     Priority: Medium     Seen at [facility], and Dr. Yang. Surgery in 2010.        Screening for hypertension 10/16/2007     Priority: Medium      Past Medical History:   Diagnosis Date     Mild intermittent asthma without complication 3/24/2016     Polyp of nasal cavity      Past Surgical History:   Procedure Laterality Date     ENT SURGERY       ETHMOIDECTOMY  6/16/2014    Procedure: ETHMOIDECTOMY;  Surgeon: Jimbo Yang MD;  Location: [facility] OR     SURGICAL HISTORY OF -   2010    Nasal Polyp; 3 total surgeries: 1997     Current Outpatient Medications   Medication Sig Dispense Refill     acetaminophen (TYLENOL) 500 MG tablet Take 1,000 mg by mouth every 6 hours as needed for mild pain       albuterol (PROAIR HFA/PROVENTIL HFA/VENTOLIN HFA) 108 (90 Base) MCG/ACT inhaler Inhale 2 puffs into the lungs every 4 hours as needed for shortness of breath / dyspnea 1 Inhaler 11     azelastine (ASTELIN) 0.1 % nasal spray Spray 2 sprays into both nostrils 2 times daily as needed for rhinitis 30 mL 3     budesonide (PULMICORT) 0.5 MG/2ML neb solution Place 0.5 mg/2 mL budesonide vial in 8 oz normal saline sinus rinse bottle.  Irrigate each nostril with one half of the bottle twice daily. 360 mL 3     fluticasone-vilanterol (BREO ELLIPTA) 200-25 MCG/INH inhaler Inhale 1 puff into the lungs daily 1 Inhaler 3     montelukast (SINGULAIR) 10 MG tablet Take 1 tablet (10 mg) by mouth At Bedtime 90 tablet 3     Multiple Vitamin (MULTIVITAMIN) per tablet Take 1 tablet by mouth daily. 100 tablet 12     sildenafil (REVATIO) 20 MG tablet Take 1 tablet (20 mg) by mouth three times daily for pulmonary hypertension. 30 tablet 11     valACYclovir (VALTREX) 500 MG tablet Take 2 tablets (1,000 mg) by mouth 2 times daily for 10 days 40 tablet 5     OTC products: None, except as noted above    Allergies   Allergen Reactions     Advil [Ibuprofen Micronized] Other (See Comments)     Tight chest     Asa [Aspirin]  "Nausea and Vomiting     Asthma - tight chest     Hazelnuts [Nuts] Other (See Comments)     Chest tight. Positive SPT. Tolerates other tree nuts and peanut.     Naprosyn [Naproxen] GI Disturbance     Stomach       Latex Allergy: NO    Social History     Tobacco Use     Smoking status: Never Smoker     Smokeless tobacco: Never Used   Substance Use Topics     Alcohol use: Yes     Comment: wine or mixed 2 a month or more.     History   Drug Use No       REVIEW OF SYSTEMS:   CONSTITUTIONAL: NEGATIVE for fever, chills, change in weight  ENT/MOUTH: NEGATIVE for ear, mouth and throat problems  RESP: NEGATIVE for significant cough or SOB  CV: NEGATIVE for chest pain, palpitations or peripheral edema    EXAM:   /78   Pulse 56   Temp 97.1  F (36.2  C) (Tympanic)   Resp 16   Ht 1.727 m (5' 8\")   Wt 81.6 kg (180 lb)   SpO2 97%   BMI 27.37 kg/m    Exam:  GENERAL APPEARANCE: healthy, alert and no distress  EYES: EOMI,  PERRL  HENT: ear canals and TM's normal and nose and mouth without ulcers or lesions  NECK: no adenopathy, no asymmetry, masses, or scars and thyroid normal to palpation  RESP: lungs clear to auscultation - no rales, rhonchi or wheezes  CV: regular rates and rhythm, normal S1 S2, no S3 or S4 and no murmur, click or rub -  ABDOMEN:  soft, nontender, no HSM or masses and bowel sounds normal  MS: extremities normal- no gross deformities noted, no evidence of inflammation in joints, FROM in all extremities.  SKIN: no suspicious lesions or rashes  NEURO: Normal strength and tone, sensory exam grossly normal, mentation intact and speech normal  PSYCH: mentation appears normal and affect normal/bright  LYMPHATICS: No axillary, cervical, inguinal, or supraclavicular nodes      DIAGNOSTICS:     EKG: sinus bradycardia, normal axis, normal intervals, no acute ST/T changes c/w ischemia, no LVH by voltage criteria, unchanged from previous tracings; he is a runner.  Labs Drawn and in Process:   Unresulted Labs " Ordered in the Past 30 Days of this Admission     No orders found from 10/19/2019 to 2019.          Recent Labs   Lab Test 10/26/18  0931 14  0724   HGB 15.5 15.4    166   NA  --  143   POTASSIUM  --  3.9   CR  --  0.92      IMPRESSION:   Reason for surgery/procedure: Marco Stovall (: 1965) presents for pre-operative evaluation assessment as requested by Dr. Swartz.  He requires evaluation and anesthesia risk assessment prior to undergoing surgery/procedure for treatment of Chronic pansinusitis, nasal polyposis, Samter's triad.Proposed Surgery/ Procedure: Bilateral endoscopic maxillary antrostomies with tissue removal, bilateral endoscopic total ethmoidectomies, bilateral endoscopic sphenoidotomies, bilateral endoscopic frontal sinusotomies, bilateral endoscopic nasal polypectomy.     The proposed surgical procedure is considered LOW risk.    REVISED CARDIAC RISK INDEX  The patient has the following serious cardiovascular risks for perioperative complications such as (MI, PE, VFib and 3  AV Block):  No serious cardiac risks  INTERPRETATION: 0 risks: Class I (very low risk - 0.4% complication rate)    The patient has the following additional risks for perioperative complications:  No identified additional risks      ICD-10-CM    1. Preop general physical exam Z01.818        RECOMMENDATIONS:       --Patient is to take all scheduled medications on the day before surgery EXCEPT for modifications listed below.  Your stomach should be empty for 8 hours before surgery.   Take no aspirin or advil or aleve one week before surgery. Tylenol is OK.     APPROVAL GIVEN to proceed with proposed procedure, without further diagnostic evaluation       Signed Electronically by: Andrew Andrade MD    Copy of this evaluation report is provided to requesting physician.    [facility] Preop Guidelines    Revised Cardiac Risk Index

## 2019-11-18 NOTE — PATIENT INSTRUCTIONS
Before Your Surgery      Call your surgeon if there is any change in your health. This includes signs of a cold or flu (such as a sore throat, runny nose, cough, rash or fever).    Do not smoke, drink alcohol or take over the counter medicine (unless your surgeon or primary care doctor tells you to) for the 24 hours before and after surgery.    If you take prescribed drugs: Follow your doctor s orders about which medicines to take and which to stop until after surgery.    Eating and drinking prior to surgery: follow the instructions from your surgeon    Take a shower or bath the night before surgery. Use the soap your surgeon gave you to gently clean your skin. If you do not have soap from your surgeon, use your regular soap. Do not shave or scrub the surgery site.  Wear clean pajamas and have clean sheets on your bed.       DIAGNOSTICS:     EKG: sinus bradycardia, normal axis, normal intervals, no acute ST/T changes c/w ischemia, no LVH by voltage criteria, unchanged from previous tracings; he is a runner.  Labs Drawn and in Process:   Unresulted Labs Ordered in the Past 30 Days of this Admission     No orders found from 10/19/2019 to 2019.          Recent Labs   Lab Test 10/26/18  0931 14  0724   HGB 15.5 15.4    166   NA  --  143   POTASSIUM  --  3.9   CR  --  0.92      IMPRESSION:   Reason for surgery/procedure: Marco Stovall (: 1965) presents for pre-operative evaluation assessment as requested by Dr. Swartz.  He requires evaluation and anesthesia risk assessment prior to undergoing surgery/procedure for treatment of Chronic pansinusitis, nasal polyposis, Samter's triad.Proposed Surgery/ Procedure: Bilateral endoscopic maxillary antrostomies with tissue removal, bilateral endoscopic total ethmoidectomies, bilateral endoscopic sphenoidotomies, bilateral endoscopic frontal sinusotomies, bilateral endoscopic nasal polypectomy.     The proposed surgical procedure is considered LOW  risk.    REVISED CARDIAC RISK INDEX  The patient has the following serious cardiovascular risks for perioperative complications such as (MI, PE, VFib and 3  AV Block):  No serious cardiac risks  INTERPRETATION: 0 risks: Class I (very low risk - 0.4% complication rate)    The patient has the following additional risks for perioperative complications:  No identified additional risks      ICD-10-CM    1. Preop general physical exam Z01.818        RECOMMENDATIONS:       --Patient is to take all scheduled medications on the day before surgery EXCEPT for modifications listed below.  Your stomach should be empty for 8 hours before surgery.   Take no aspirin or advil or aleve one week before surgery. Tylenol is OK.     APPROVAL GIVEN to proceed with proposed procedure, without further diagnostic evaluation

## 2019-12-02 ENCOUNTER — ANESTHESIA EVENT (OUTPATIENT)
Dept: SURGERY | Facility: AMBULATORY SURGERY CENTER | Age: 54
End: 2019-12-02

## 2019-12-03 ENCOUNTER — HOSPITAL ENCOUNTER (OUTPATIENT)
Facility: AMBULATORY SURGERY CENTER | Age: 54
Discharge: HOME | End: 2019-12-03
Attending: OTOLARYNGOLOGY | Admitting: OTOLARYNGOLOGY
Payer: COMMERCIAL

## 2019-12-03 ENCOUNTER — ANESTHESIA (OUTPATIENT)
Dept: SURGERY | Facility: AMBULATORY SURGERY CENTER | Age: 54
End: 2019-12-03
Payer: COMMERCIAL

## 2019-12-03 VITALS
TEMPERATURE: 97.9 F | DIASTOLIC BLOOD PRESSURE: 95 MMHG | SYSTOLIC BLOOD PRESSURE: 134 MMHG | RESPIRATION RATE: 16 BRPM | OXYGEN SATURATION: 95 % | HEART RATE: 74 BPM

## 2019-12-03 DIAGNOSIS — Z88.6 SAMTER'S TRIAD: ICD-10-CM

## 2019-12-03 DIAGNOSIS — J45.909 SAMTER'S TRIAD: ICD-10-CM

## 2019-12-03 DIAGNOSIS — J33.9 NASAL POLYPOSIS: ICD-10-CM

## 2019-12-03 DIAGNOSIS — J33.9 SAMTER'S TRIAD: ICD-10-CM

## 2019-12-03 DIAGNOSIS — Z98.890 HISTORY OF ENDOSCOPIC SINUS SURGERY: ICD-10-CM

## 2019-12-03 DIAGNOSIS — Z98.890 STATUS POST FUNCTIONAL ENDOSCOPIC SINUS SURGERY: Primary | ICD-10-CM

## 2019-12-03 DIAGNOSIS — J32.4 CHRONIC PANSINUSITIS: ICD-10-CM

## 2019-12-03 DIAGNOSIS — J45.50 SEVERE PERSISTENT ASTHMA WITHOUT COMPLICATION (H): ICD-10-CM

## 2019-12-03 PROCEDURE — 31267 ENDOSCOPY MAXILLARY SINUS: CPT | Mod: 50

## 2019-12-03 PROCEDURE — G8907 PT DOC NO EVENTS ON DISCHARG: HCPCS

## 2019-12-03 PROCEDURE — 61782 SCAN PROC CRANIAL EXTRA: CPT

## 2019-12-03 PROCEDURE — G8918 PT W/O PREOP ORDER IV AB PRO: HCPCS

## 2019-12-03 PROCEDURE — 31253 NSL/SINS NDSC TOTAL: CPT | Mod: 50 | Performed by: OTOLARYNGOLOGY

## 2019-12-03 PROCEDURE — 61782 SCAN PROC CRANIAL EXTRA: CPT | Performed by: OTOLARYNGOLOGY

## 2019-12-03 PROCEDURE — 31259 NSL/SINS NDSC SPHN TISS RMVL: CPT | Mod: 50 | Performed by: OTOLARYNGOLOGY

## 2019-12-03 PROCEDURE — 31253 NSL/SINS NDSC TOTAL: CPT | Mod: 50

## 2019-12-03 PROCEDURE — 31267 ENDOSCOPY MAXILLARY SINUS: CPT | Mod: 50 | Performed by: OTOLARYNGOLOGY

## 2019-12-03 PROCEDURE — 31259 NSL/SINS NDSC SPHN TISS RMVL: CPT | Mod: 50

## 2019-12-03 DEVICE — IMP SINUS PROPEL MOMETASONE FUORATE 370MCCG 50011: Type: IMPLANTABLE DEVICE | Site: SINUS | Status: FUNCTIONAL

## 2019-12-03 RX ORDER — FENTANYL CITRATE 50 UG/ML
INJECTION, SOLUTION INTRAMUSCULAR; INTRAVENOUS PRN
Status: DISCONTINUED | OUTPATIENT
Start: 2019-12-03 | End: 2019-12-03

## 2019-12-03 RX ORDER — ALBUTEROL SULFATE 0.83 MG/ML
2.5 SOLUTION RESPIRATORY (INHALATION) EVERY 4 HOURS PRN
Status: DISCONTINUED | OUTPATIENT
Start: 2019-12-03 | End: 2019-12-04 | Stop reason: HOSPADM

## 2019-12-03 RX ORDER — ONDANSETRON 2 MG/ML
INJECTION INTRAMUSCULAR; INTRAVENOUS PRN
Status: DISCONTINUED | OUTPATIENT
Start: 2019-12-03 | End: 2019-12-03

## 2019-12-03 RX ORDER — ACETAMINOPHEN 325 MG/1
650 TABLET ORAL ONCE
Status: DISCONTINUED | OUTPATIENT
Start: 2019-12-03 | End: 2019-12-04 | Stop reason: HOSPADM

## 2019-12-03 RX ORDER — ONDANSETRON 4 MG/1
4 TABLET, ORALLY DISINTEGRATING ORAL ONCE
Status: DISCONTINUED | OUTPATIENT
Start: 2019-12-03 | End: 2019-12-04 | Stop reason: HOSPADM

## 2019-12-03 RX ORDER — GABAPENTIN 300 MG/1
300 CAPSULE ORAL ONCE
Status: COMPLETED | OUTPATIENT
Start: 2019-12-03 | End: 2019-12-03

## 2019-12-03 RX ORDER — OXYMETAZOLINE HYDROCHLORIDE 0.05 G/100ML
2 SPRAY NASAL ONCE
Status: COMPLETED | OUTPATIENT
Start: 2019-12-03 | End: 2019-12-03

## 2019-12-03 RX ORDER — OXYCODONE HYDROCHLORIDE 5 MG/1
10 TABLET ORAL EVERY 4 HOURS PRN
Status: DISCONTINUED | OUTPATIENT
Start: 2019-12-03 | End: 2019-12-04 | Stop reason: HOSPADM

## 2019-12-03 RX ORDER — HYDRALAZINE HYDROCHLORIDE 20 MG/ML
2.5-5 INJECTION INTRAMUSCULAR; INTRAVENOUS EVERY 10 MIN PRN
Status: DISCONTINUED | OUTPATIENT
Start: 2019-12-03 | End: 2019-12-04 | Stop reason: HOSPADM

## 2019-12-03 RX ORDER — LIDOCAINE HYDROCHLORIDE 20 MG/ML
INJECTION, SOLUTION INFILTRATION; PERINEURAL PRN
Status: DISCONTINUED | OUTPATIENT
Start: 2019-12-03 | End: 2019-12-03

## 2019-12-03 RX ORDER — NALOXONE HYDROCHLORIDE 0.4 MG/ML
.1-.4 INJECTION, SOLUTION INTRAMUSCULAR; INTRAVENOUS; SUBCUTANEOUS
Status: DISCONTINUED | OUTPATIENT
Start: 2019-12-03 | End: 2019-12-04 | Stop reason: HOSPADM

## 2019-12-03 RX ORDER — ACETAMINOPHEN 500 MG
1000 TABLET ORAL EVERY 6 HOURS
Qty: 200 TABLET | Refills: 1 | Status: SHIPPED | OUTPATIENT
Start: 2019-12-03 | End: 2019-12-13

## 2019-12-03 RX ORDER — SODIUM CHLORIDE, SODIUM LACTATE, POTASSIUM CHLORIDE, CALCIUM CHLORIDE 600; 310; 30; 20 MG/100ML; MG/100ML; MG/100ML; MG/100ML
INJECTION, SOLUTION INTRAVENOUS CONTINUOUS
Status: DISCONTINUED | OUTPATIENT
Start: 2019-12-03 | End: 2019-12-04 | Stop reason: HOSPADM

## 2019-12-03 RX ORDER — PROPOFOL 10 MG/ML
INJECTION, EMULSION INTRAVENOUS CONTINUOUS PRN
Status: DISCONTINUED | OUTPATIENT
Start: 2019-12-03 | End: 2019-12-03

## 2019-12-03 RX ORDER — HYDROMORPHONE HYDROCHLORIDE 1 MG/ML
.3-.5 INJECTION, SOLUTION INTRAMUSCULAR; INTRAVENOUS; SUBCUTANEOUS EVERY 10 MIN PRN
Status: DISCONTINUED | OUTPATIENT
Start: 2019-12-03 | End: 2019-12-04 | Stop reason: HOSPADM

## 2019-12-03 RX ORDER — ONDANSETRON 2 MG/ML
4 INJECTION INTRAMUSCULAR; INTRAVENOUS EVERY 30 MIN PRN
Status: DISCONTINUED | OUTPATIENT
Start: 2019-12-03 | End: 2019-12-04 | Stop reason: HOSPADM

## 2019-12-03 RX ORDER — OXYCODONE HYDROCHLORIDE 5 MG/1
5 TABLET ORAL EVERY 6 HOURS PRN
Qty: 10 TABLET | Refills: 0 | Status: SHIPPED | OUTPATIENT
Start: 2019-12-03 | End: 2020-02-05

## 2019-12-03 RX ORDER — OXYCODONE HYDROCHLORIDE 5 MG/1
5-10 TABLET ORAL ONCE
Status: COMPLETED | OUTPATIENT
Start: 2019-12-03 | End: 2019-12-03

## 2019-12-03 RX ORDER — DEXAMETHASONE SODIUM PHOSPHATE 4 MG/ML
10 INJECTION, SOLUTION INTRA-ARTICULAR; INTRALESIONAL; INTRAMUSCULAR; INTRAVENOUS; SOFT TISSUE ONCE
Status: COMPLETED | OUTPATIENT
Start: 2019-12-03 | End: 2019-12-03

## 2019-12-03 RX ORDER — ACETAMINOPHEN 325 MG/1
975 TABLET ORAL ONCE
Status: COMPLETED | OUTPATIENT
Start: 2019-12-03 | End: 2019-12-03

## 2019-12-03 RX ORDER — SENNA AND DOCUSATE SODIUM 50; 8.6 MG/1; MG/1
1 TABLET, FILM COATED ORAL AT BEDTIME
Qty: 30 TABLET | Refills: 1 | Status: SHIPPED | OUTPATIENT
Start: 2019-12-03 | End: 2020-01-10

## 2019-12-03 RX ORDER — CEFAZOLIN SODIUM 1 G/3ML
INJECTION, POWDER, FOR SOLUTION INTRAMUSCULAR; INTRAVENOUS PRN
Status: DISCONTINUED | OUTPATIENT
Start: 2019-12-03 | End: 2019-12-03

## 2019-12-03 RX ORDER — FENTANYL CITRATE 50 UG/ML
25-50 INJECTION, SOLUTION INTRAMUSCULAR; INTRAVENOUS
Status: DISCONTINUED | OUTPATIENT
Start: 2019-12-03 | End: 2019-12-04 | Stop reason: HOSPADM

## 2019-12-03 RX ORDER — EPINEPHRINE NASAL SOLUTION 1 MG/ML
SOLUTION NASAL PRN
Status: DISCONTINUED | OUTPATIENT
Start: 2019-12-03 | End: 2019-12-03 | Stop reason: HOSPADM

## 2019-12-03 RX ORDER — MEPERIDINE HYDROCHLORIDE 25 MG/ML
12.5 INJECTION INTRAMUSCULAR; INTRAVENOUS; SUBCUTANEOUS
Status: DISCONTINUED | OUTPATIENT
Start: 2019-12-03 | End: 2019-12-04 | Stop reason: HOSPADM

## 2019-12-03 RX ORDER — ONDANSETRON 4 MG/1
4 TABLET, ORALLY DISINTEGRATING ORAL EVERY 30 MIN PRN
Status: DISCONTINUED | OUTPATIENT
Start: 2019-12-03 | End: 2019-12-04 | Stop reason: HOSPADM

## 2019-12-03 RX ORDER — PROPOFOL 10 MG/ML
INJECTION, EMULSION INTRAVENOUS PRN
Status: DISCONTINUED | OUTPATIENT
Start: 2019-12-03 | End: 2019-12-03

## 2019-12-03 RX ORDER — LIDOCAINE 40 MG/G
CREAM TOPICAL
Status: DISCONTINUED | OUTPATIENT
Start: 2019-12-03 | End: 2019-12-04 | Stop reason: HOSPADM

## 2019-12-03 RX ORDER — DEXAMETHASONE SODIUM PHOSPHATE 4 MG/ML
4 INJECTION, SOLUTION INTRA-ARTICULAR; INTRALESIONAL; INTRAMUSCULAR; INTRAVENOUS; SOFT TISSUE EVERY 10 MIN PRN
Status: DISCONTINUED | OUTPATIENT
Start: 2019-12-03 | End: 2019-12-04 | Stop reason: HOSPADM

## 2019-12-03 RX ADMIN — FENTANYL CITRATE 25 MCG: 50 INJECTION, SOLUTION INTRAMUSCULAR; INTRAVENOUS at 07:52

## 2019-12-03 RX ADMIN — Medication 10 MG: at 08:43

## 2019-12-03 RX ADMIN — FENTANYL CITRATE 50 MCG: 50 INJECTION, SOLUTION INTRAMUSCULAR; INTRAVENOUS at 07:41

## 2019-12-03 RX ADMIN — Medication 10 MG: at 08:06

## 2019-12-03 RX ADMIN — SODIUM CHLORIDE, SODIUM LACTATE, POTASSIUM CHLORIDE, CALCIUM CHLORIDE: 600; 310; 30; 20 INJECTION, SOLUTION INTRAVENOUS at 07:01

## 2019-12-03 RX ADMIN — DEXAMETHASONE SODIUM PHOSPHATE 10 MG: 4 INJECTION, SOLUTION INTRA-ARTICULAR; INTRALESIONAL; INTRAMUSCULAR; INTRAVENOUS; SOFT TISSUE at 07:50

## 2019-12-03 RX ADMIN — PROPOFOL 200 MG: 10 INJECTION, EMULSION INTRAVENOUS at 07:30

## 2019-12-03 RX ADMIN — GABAPENTIN 300 MG: 300 CAPSULE ORAL at 06:50

## 2019-12-03 RX ADMIN — Medication 10 MG: at 09:19

## 2019-12-03 RX ADMIN — SODIUM CHLORIDE, SODIUM LACTATE, POTASSIUM CHLORIDE, CALCIUM CHLORIDE: 600; 310; 30; 20 INJECTION, SOLUTION INTRAVENOUS at 09:17

## 2019-12-03 RX ADMIN — Medication 0.5 MG: at 09:57

## 2019-12-03 RX ADMIN — ACETAMINOPHEN 975 MG: 325 TABLET ORAL at 06:50

## 2019-12-03 RX ADMIN — Medication 40 MG: at 07:30

## 2019-12-03 RX ADMIN — PROPOFOL 50 MG: 10 INJECTION, EMULSION INTRAVENOUS at 08:21

## 2019-12-03 RX ADMIN — OXYCODONE HYDROCHLORIDE 5 MG: 5 TABLET ORAL at 11:42

## 2019-12-03 RX ADMIN — CEFAZOLIN SODIUM 2 G: 1 INJECTION, POWDER, FOR SOLUTION INTRAMUSCULAR; INTRAVENOUS at 09:32

## 2019-12-03 RX ADMIN — Medication 0.5 MG: at 07:39

## 2019-12-03 RX ADMIN — Medication 0.5 MG: at 08:11

## 2019-12-03 RX ADMIN — PROPOFOL 50 MCG/KG/MIN: 10 INJECTION, EMULSION INTRAVENOUS at 08:47

## 2019-12-03 RX ADMIN — LIDOCAINE HYDROCHLORIDE 80 MG: 20 INJECTION, SOLUTION INFILTRATION; PERINEURAL at 07:30

## 2019-12-03 RX ADMIN — CEFAZOLIN SODIUM 2 G: 1 INJECTION, POWDER, FOR SOLUTION INTRAMUSCULAR; INTRAVENOUS at 07:35

## 2019-12-03 RX ADMIN — ONDANSETRON 4 MG: 2 INJECTION INTRAMUSCULAR; INTRAVENOUS at 09:59

## 2019-12-03 RX ADMIN — OXYMETAZOLINE HYDROCHLORIDE 2 SPRAY: 0.05 SPRAY NASAL at 07:00

## 2019-12-03 RX ADMIN — FENTANYL CITRATE 25 MCG: 50 INJECTION, SOLUTION INTRAMUSCULAR; INTRAVENOUS at 07:22

## 2019-12-03 NOTE — ANESTHESIA PREPROCEDURE EVALUATION
Anesthesia Pre-Procedure Evaluation    Patient: Marco Stovall   MRN:     [id number] Gender:   male   Age:    54 year old :      1965        Preoperative Diagnosis: Chronic pansinusitis [J32.4]  Nasal polyposis [J33.9]  Samter's triad [J45.909, J33.9, Z88.6]  Severe persistent asthma without complication [J45.50]  History of endoscopic sinus surgery [Z98.890]   Procedure(s):  Bilateral endoscopic maxillary antrostomies with tissue removal, bilateral endoscopic total ethmoidectomies, bilateral endoscopic sphenoidotomies, bilateral endoscopic frontal sinusotomies, bilateral endoscopic nasal polypectomy, image guidance     Past Medical History:   Diagnosis Date     Mild intermittent asthma without complication 3/24/2016     Polyp of nasal cavity       Past Surgical History:   Procedure Laterality Date     ENT SURGERY       ETHMOIDECTOMY  2014    Procedure: ETHMOIDECTOMY;  Surgeon: Jimbo Yang MD;  Location: [facility] OR     SURGICAL HISTORY OF -       Nasal Polyp; 3 total surgeries:           Anesthesia Evaluation     . Pt has had prior anesthetic. Type: General           ROS/MED HX    ENT/Pulmonary:     (+)other ENT- Sinus problems, Intermittent asthma , . .    Neurologic:  - neg neurologic ROS     Cardiovascular:  - neg cardiovascular ROS       METS/Exercise Tolerance:     Hematologic:  - neg hematologic  ROS       Musculoskeletal:  - neg musculoskeletal ROS       GI/Hepatic:  - neg GI/hepatic ROS       Renal/Genitourinary:  - ROS Renal section negative       Endo:  - neg endo ROS       Psychiatric:  - neg psychiatric ROS       Infectious Disease:  - neg infectious disease ROS       Malignancy:      - no malignancy   Other:    - neg other ROS                     PHYSICAL EXAM:   Mental Status/Neuro: A/A/O   Airway: Facies: Feasible (Smaller thyromentatl  distance, but opens up his mouth wide)  Mallampati: I  Mouth/Opening: Full  TM distance: < 6 cm  Neck ROM: Full   Respiratory:  "Auscultation: CTAB     Resp. Rate: Normal     Resp. Effort: Normal      CV: Rhythm: Regular  Rate: Age appropriate  Heart: Normal Sounds  Edema: None   Comments:      Dental: Normal Dentition                LABS:  CBC:   Lab Results   Component Value Date    WBC 8.4 11/18/2019    WBC 7.0 10/26/2018    HGB 14.9 11/18/2019    HGB 15.5 10/26/2018    HCT 43.9 11/18/2019    HCT 45.7 10/26/2018     11/18/2019     10/26/2018     BMP:   Lab Results   Component Value Date     06/09/2014    POTASSIUM 3.9 06/09/2014    CHLORIDE 106 06/09/2014    CO2 28 06/09/2014    BUN 14 06/09/2014    CR 0.85 11/18/2019    CR 0.92 06/09/2014     (H) 05/15/2017     (H) 06/09/2014     COAGS:   Lab Results   Component Value Date    PTT 29 10/11/2010    INR 0.93 10/11/2010     POC: No results found for: BGM, HCG, HCGS  OTHER:   Lab Results   Component Value Date    MARLON 9.1 06/09/2014    TSH 2.05 07/15/2011    T4 0.85 07/15/2011    T3 125 12/14/2009        Preop Vitals    BP Readings from Last 3 Encounters:   12/03/19 (!) 130/91   11/18/19 130/78   11/06/19 124/75    Pulse Readings from Last 3 Encounters:   11/18/19 56   11/06/19 67   09/17/19 51      Resp Readings from Last 3 Encounters:   12/03/19 18   11/18/19 16   11/06/19 18    SpO2 Readings from Last 3 Encounters:   12/03/19 97%   11/18/19 97%   09/17/19 98%      Temp Readings from Last 1 Encounters:   12/03/19 96.4  F (35.8  C)    Ht Readings from Last 1 Encounters:   11/18/19 1.727 m (5' 8\")      Wt Readings from Last 1 Encounters:   11/18/19 81.6 kg (180 lb)    Estimated body mass index is 27.37 kg/m  as calculated from the following:    Height as of 11/18/19: 1.727 m (5' 8\").    Weight as of 11/18/19: 81.6 kg (180 lb).     LDA:        Assessment:   ASA SCORE: 2    H&P: History and physical reviewed and following examination; no interval change.   Smoking Status:  Non-Smoker/Unknown   NPO Status: NPO Appropriate     Plan:   Anes. Type:  General "   Pre-Medication: None   Induction:  IV (Standard)   Airway: ETT; Oral   Access/Monitoring: PIV   Maintenance: Balanced     Postop Plan:   Postop Pain: Opioids  Postop Sedation/Airway: Not planned     PONV Management:   Adult Risk Factors:, Non-Smoker, Postop Opioids   Prevention: Ondansetron, Dexamethasone     CONSENT: Direct conversation   Plan and risks discussed with: Patient; Spouse   Blood Products: Consent Deferred (Minimal Blood Loss)                   Andrew Atkins MD

## 2019-12-03 NOTE — ANESTHESIA CARE TRANSFER NOTE
Patient: Marco Stovall    Procedure(s):  Bilateral endoscopic maxillary antrostomies with tissue removal, bilateral endoscopic total ethmoidectomies, bilateral endoscopic sphenoidotomies, bilateral endoscopic frontal sinusotomies, bilateral endoscopic nasal polypectomy, image guidance    Diagnosis: Chronic pansinusitis [J32.4]  Nasal polyposis [J33.9]  Samter's triad [J45.909, J33.9, Z88.6]  Severe persistent asthma without complication [J45.50]  History of endoscopic sinus surgery [Z98.890]  Diagnosis Additional Information: No value filed.    Anesthesia Type:   General     Note:  Airway :Face Mask and Oral Airway  Patient transferred to:PACU  Handoff Report: Identifed the Patient, Identified the Reponsible Provider, Reviewed the pertinent medical history, Discussed the surgical course, Reviewed Intra-OP anesthesia mangement and issues during anesthesia, Set expectations for post-procedure period and Allowed opportunity for questions and acknowledgement of understanding      Vitals: (Last set prior to Anesthesia Care Transfer)    CRNA VITALS  12/3/2019 1000 - 12/3/2019 1035      12/3/2019             Resp Rate (observed):  (!) 3                Electronically Signed By: LARA Palma CRNA  December 3, 2019  10:35 AM

## 2019-12-03 NOTE — DISCHARGE INSTRUCTIONS
Postoperative Instructions Following Nose and Sinus Surgery    Recovery - Everyone recovers differently from a general anesthetic.  Symptoms such as fatigue, nausea, light-headedness, and sometimes a low grade fever (up to 101 degrees) are not unusual.  As your body removes the anesthetic drugs from circulation, these symptoms will resolve.  Your nose will be sore after surgery, and you may even have symptoms similar to a sinus infection with headache, congestion, and pressure.  These symptoms are normal and will resolve with healing.  For a few days you may experience bloody drainage from the nose, please use the drip pad as necessary for this.  If you are soaking a drip pad more frequently than once every 20 minutes, please call the office during business hours or the on call ENT physician after hours.  There are no diet restrictions after sinus surgery, and you can resume your home medications.  Please do not blow your nose for two weeks after surgery. You may wipe your nose gently. Limit your activity to no strenuous activities or exercise until I see you for the first follow-up visit.      Medications - You were sent home with narcotic pain medication.  If you can tolerate the discomfort during your recovery by using just plain Tylenol, please do so.  However, do not hesitate to use the stronger pain medication if needed.  If you were sent home with an antibiotic, it is primarily used to help the healing process.  If it causes loose bowel movements or other signs of intolerance, it is appropriate to discontinue it.      Nasal Irrigations: By far the most important measure you can take to speed recovery, and maximize the chances of long term success of sinus surgery is using the sinus rinses at least two times per day for the first month after surgery.     Complications - Problems related to sinus surgery almost always are detected during the operation, and special instructions will be given in that situation.   However, unexpected things can happen, and are all related to the structures around the sinus cavities.  Symptoms that should alert you to a possible problem include: severe eye pain or eye swelling, persistent heavy bleeding from the nose, and high fevers with headache and neck pain.  Any of these symptoms should be called into my office or to the on call ENT if after hours.  The most common non-emergency complication of sinus surgery is the formation of scar tissue which can re-block the sinuses.  This is addressed below.      Follow up - I will then see you around 10-14 days after surgery to clean out your nose.  This is done to aggressively manage the most common complication of this procedure, which is scar tissue blocking the sinuses.  The visit will require the examination of your nose and possibly removal of crusts of dry mucous and blood, with possible removal of early scar tissue.  Please prepare for these visits by using your sinus rinses.    If there are any questions or issues with the above, or if there are other issues that concern you, always feel free to call the clinic and I am happy to speak with you as soon as feasible.    Josh Swartz MD  Department of Otolarygology-Head and Neck Surgery  [facility]  [phone] or [phone] After hours, [facility] option    [facility]  Same-Day Surgery   Adult Discharge Orders & Instructions   For 24 hours after surgery  1. Get plenty of rest.  A responsible adult must stay with you for at least 24 hours after you leave the hospital.   2. Do not drive or use heavy equipment.  If you have weakness or tingling, don't drive or use heavy equipment until this feeling goes away.  3. Do not drink alcohol.  4. Avoid strenuous or risky activities.  Ask for help when climbing stairs.   5. You may feel lightheaded.  IF so, sit for a few minutes before standing.  Have someone help you get up.   6. If you have nausea  (feel sick to your stomach): Drink only clear liquids such as apple juice, ginger ale, broth or 7-Up.  Rest may also help.  Be sure to drink enough fluids.  Move to a regular diet as you feel able.  7. You may have a slight fever. Call the doctor if your fever is over 100 F (37.7 C) (taken under the tongue) or lasts longer than 24 hours.  8. You may have a dry mouth, a sore throat, muscle aches or trouble sleeping.  These should go away after 24 hours.  9. Do not make important or legal decisions.   Call your doctor for any of the followin.  Signs of infection (fever, growing tenderness at the surgery site, a large amount of drainage or bleeding, severe pain, foul-smelling drainage, redness, swelling).    2. It has been over 8 to 10 hours since surgery and you are still not able to urinate (pass water).    3.  Headache for over 24 hours.  To contact a doctor, call [phone].    **Acetaminophen (Tylenol)  975mg taken at 6:50am.   **Oxycodone 5mg taken at 11:45am.

## 2019-12-03 NOTE — OP NOTE
PREOPERATIVE DIAGNOSES: Chronic sinusitis with nasal polyposis, history of endoscopic sinus surgery, Samter's triad.     POSTOPERATIVE DIAGNOSES: Same.     PROCEDURE PERFORMED:   1. Bilateral endoscopic maxillary antrostomies with tissue removal  2. Bilateral endoscopic total ethmoidectomies  3. Bilateral endoscopic sphenoidotomies with tissue removal  4. Bilateral endoscopic frontal sinusotmies   5. Bilateral endoscopic nasal polypectomy  6. [employer] Fusion Image Guidance     SURGEON: Josh Swartz MD      ASSISTANTS: None.     BLOOD LOSS:  1300 mL.     COMPLICATIONS: None.      SPECIMENS: None.     ANESTHESIA: General.     GRAFTS, IMPLANTS, DRAINS: None.     INDICATIONS: Improve symptoms.      FINDINGS:   1.  Significant bilateral chronic sinusitis, nasal polyposis, and inflammation.     OPERATIVE TECHNIQUE: The patient was brought to the operating room and identified by name clinic number.  They were placed supinely on the operating room table and general endotracheal anesthesia was induced by the anesthesia service.  The patient was prepped and draped in standard fashion.  [employer] Fusion Image Guidance was placed and registered.  Image guidance was used due to revision sinus surgery, extensive sino-nasal polyposis, pathology involving the frontal/posterior ethmoid/sphenoid sinuses, disease abutting the skull base/orbit.     Afrin was sprayed in the bilateral nostrils.  Epinephrine 1:1000 soaked pledgets were placed in the nasal cavities bilaterally.  After standard surgical pause, the 0 degree endoscope was used to visualize the right nasal cavity.  There was significant sinonasal polyposis filling the middle meatus and also medial to the middle turbinate extending to the skull base.  The extensive polyposis was resected using a 12 degree, 4 mm shaver and the 0 degree endoscope.  The middle turbinate was quite flaccid given the significant polyposis involving the turbinate.  The decision was made to take the  middle turbinate the skull base.  Straight regarding his mentation was used to take the middle turbinate posteriorly with the slope of the skull base back to its origin.  Patient appeared to have had a posterior maxillary antrostomy performed previously.  Using the maxillary sinus seeker, the uncinate was brought forward.  The uncinate was then taken up to its origin.  Using through cutting instrumentation, 12 degree 4 mm shaver, and the 30 degree endoscope, the maxillary sinus opening was taken to the posterior limit of the maxillary sinus connecting the previously created surgical opening.  A wide maxillary antrostomy was fashioned respecting the orbit and inferior turbinate.  Polypoid tissue within the sinus was resected using curved maxillary sinus instrumentation and the curved shaver.  Next, the ethmoid bulla was entered inferomedially.  The ethmoid cell partitions were divided using through-cutting instrumentation.  Dissection was taken to the sphenoid face.  The sphenoid ostia was identified.  A wide sphenoidotomy was created using through-cutting instrumentation and the shaver.  There is significant polyposis within the sphenoid sinus that I resected using the 12 degrees shaver and through-cutting instrumentation.   The total ethmoidectomy was completed in a posterior to anterior fashion.  Care was taken to respect the orbit and skull base.  Next, the frontal sinus outflow tract was identified with the image guidance probe.  The agar nasi was taken down using a curved frontal instrumentation and the angled shaver.  A wide frontal sinusotomy was then created by removing surrounding air cells and opening into the floor of the frontal sinus.  I did have to use the 70 degree drill to take down the anterior beak creating a Draf IIb opening from the orbit to the septum.  Frontal sinus was then irrigated and explored.  There was an 8 mm anteroposterior x 8 mm mediolateral opening when completed.     Next,  attention was turned to the left. There was significant sinonasal polyposis filling the middle meatus and also medial to the middle turbinate extending to the skull base.  The extensive polyposis was resected using a 12 degree, 4 mm shaver and the 0 degree endoscope.  The middle turbinate was quite flaccid given the significant polyposis involving the turbinate.  The decision was made to take the middle turbinate the skull base.  Straight regarding his mentation was used to take the middle turbinate posteriorly with the slope of the skull base back to its origin.  Patient appeared to have had a posterior maxillary antrostomy performed previously.  Using the maxillary sinus seeker, the uncinate was brought forward.  The uncinate was then taken up to its origin.  Using through cutting instrumentation, 12 degree 4 mm shaver, and the 30 degree endoscope, the maxillary sinus opening was taken to the posterior limit of the maxillary sinus connecting the previously created surgical opening.  A wide maxillary antrostomy was fashioned respecting the orbit and inferior turbinate.  Polypoid tissue within the sinus was resected using curved maxillary sinus instrumentation and the curved shaver.  Next, the ethmoid bulla was entered inferomedially.  The ethmoid cell partitions were divided using through-cutting instrumentation.  Dissection was taken to the sphenoid face.  The sphenoid ostia was identified.  A wide sphenoidotomy was created using through-cutting instrumentation and the shaver.  There is significant polyposis within the sphenoid sinus that I resected using the 12 degrees shaver and through-cutting instrumentation.  The total ethmoidectomy was completed in a posterior to anterior fashion.  Care was taken to respect the orbit and skull base.  Next, the frontal sinus outflow tract was identified with the image guidance probe.  The agar nasi was taken down using a curved frontal instrumentation and the angled shaver.   A wide frontal sinusotomy was then created by removing surrounding air cells and opening into the floor of the frontal sinus.  I did have to use the 70 degree drill to take down the anterior beak creating a Draf IIb opening from the orbit to the septum.  Frontal sinus was then irrigated and explored.  There was a 10 mm anteroposterior x 8 mm mediolateral opening when completed.     The bilateral nasal cavities were irrigated and suctioned.  Hemostasis was assured.   PosiSep was placed in the sphenoidotomies and maxillary antrostomies to assist with hemostasis bilaterally.  Propel Contour spacers were placed in the bilateral frontal sinus outflow areas to assist with healing.    This marked the end of the procedure.  The patient was then turned over to anesthesia for recovery where they were awakened, extubated, and transferred to the PACU in excellent condition.  There were no complications.  There was minimal blood loss.  All standard operating protocol universal precautions were used throughout the procedure. Due to the significant inflammation from the patient's Samter's triad, significant bleeding from his sinuses, and limited visualization during the procedure, this procedure took additional time, effort, and technical skill normally required for the procedure.       Josh Swartz MD  Department of Otolarygology-Head and Neck Surgery  [facility]

## 2019-12-03 NOTE — ANESTHESIA POSTPROCEDURE EVALUATION
Anesthesia POST Procedure Evaluation    Patient: Marco Stovall   MRN:     [id number] Gender:   male   Age:    54 year old :      1965        Preoperative Diagnosis: Chronic pansinusitis [J32.4]  Nasal polyposis [J33.9]  Samter's triad [J45.909, J33.9, Z88.6]  Severe persistent asthma without complication [J45.50]  History of endoscopic sinus surgery [Z98.890]   Procedure(s):  Bilateral endoscopic maxillary antrostomies with tissue removal, bilateral endoscopic total ethmoidectomies, bilateral endoscopic sphenoidotomies, bilateral endoscopic frontal sinusotomies, bilateral endoscopic nasal polypectomy, image guidance   Postop Comments: No value filed.       Anesthesia Type:  Not documented  General    Reportable Event: NO     PAIN: Uncomplicated   Sign Out status: Comfortable, Well controlled pain     PONV: No PONV   Sign Out status:  No Nausea or Vomiting     Neuro/Psych: Uneventful perioperative course   Sign Out Status: Preoperative baseline; Age appropriate mentation     Airway/Resp.: Uneventful perioperative course   Sign Out Status: Non labored breathing, age appropriate RR; Resp. Status within EXPECTED Parameters     CV: Uneventful perioperative course   Sign Out status: Appropriate BP and perfusion indices; Appropriate HR/Rhythm     Disposition:   Sign Out in:  PACU  Disposition:  Phase II; Home  Recovery Course: Uneventful  Follow-Up: Not required           Last Anesthesia Record Vitals:  CRNA VITALS  12/3/2019 1000 - 12/3/2019 1100      12/3/2019             Resp Rate (observed):  (!) 3          Last PACU Vitals:  Vitals Value Taken Time   /97 12/3/2019 11:00 AM   Temp 97.5  F (36.4  C) 12/3/2019 11:00 AM   Pulse 69 12/3/2019 11:00 AM   Resp 16 12/3/2019 11:00 AM   SpO2 98 % 12/3/2019 11:00 AM   Temp src     NIBP     Pulse 67 12/3/2019 10:30 AM   SpO2 100 % 12/3/2019 10:30 AM   Resp     Temp     Ht Rate     Temp 2           Electronically Signed By: Andrew Atkins MD, December 3, 2019, 12:13  PM

## 2019-12-10 ENCOUNTER — MEDICAL CORRESPONDENCE (OUTPATIENT)
Dept: HEALTH INFORMATION MANAGEMENT | Facility: CLINIC | Age: 54
End: 2019-12-10

## 2019-12-10 ENCOUNTER — OFFICE VISIT (OUTPATIENT)
Dept: ALLERGY | Facility: CLINIC | Age: 54
End: 2019-12-10
Attending: OTOLARYNGOLOGY
Payer: COMMERCIAL

## 2019-12-10 ENCOUNTER — TELEPHONE (OUTPATIENT)
Dept: ALLERGY | Facility: CLINIC | Age: 54
End: 2019-12-10

## 2019-12-10 VITALS
BODY MASS INDEX: 27.7 KG/M2 | HEART RATE: 69 BPM | HEIGHT: 68 IN | DIASTOLIC BLOOD PRESSURE: 84 MMHG | SYSTOLIC BLOOD PRESSURE: 129 MMHG | TEMPERATURE: 97.1 F | WEIGHT: 182.76 LBS | OXYGEN SATURATION: 97 %

## 2019-12-10 DIAGNOSIS — J30.89 ALLERGIC RHINITIS DUE TO DUST MITE: ICD-10-CM

## 2019-12-10 DIAGNOSIS — J30.1 SEASONAL ALLERGIC RHINITIS DUE TO POLLEN: ICD-10-CM

## 2019-12-10 DIAGNOSIS — J45.50 SEVERE PERSISTENT ASTHMA WITHOUT COMPLICATION (H): Primary | ICD-10-CM

## 2019-12-10 DIAGNOSIS — J45.909 SAMTER'S TRIAD: ICD-10-CM

## 2019-12-10 DIAGNOSIS — Z88.6 ASPIRIN SENSITIVITY: ICD-10-CM

## 2019-12-10 DIAGNOSIS — J33.9 NOSE POLYP: Primary | ICD-10-CM

## 2019-12-10 DIAGNOSIS — J32.4 CHRONIC PANSINUSITIS: ICD-10-CM

## 2019-12-10 DIAGNOSIS — J33.9 SAMTER'S TRIAD: ICD-10-CM

## 2019-12-10 DIAGNOSIS — J33.9 NASAL POLYPOSIS: ICD-10-CM

## 2019-12-10 DIAGNOSIS — Z88.6 SAMTER'S TRIAD: ICD-10-CM

## 2019-12-10 LAB
FEF 25/75: NORMAL
FEV-1: NORMAL
FEV1/FVC: NORMAL
FVC: NORMAL

## 2019-12-10 PROCEDURE — 36415 COLL VENOUS BLD VENIPUNCTURE: CPT | Performed by: ALLERGY & IMMUNOLOGY

## 2019-12-10 PROCEDURE — 99214 OFFICE O/P EST MOD 30 MIN: CPT | Mod: 25 | Performed by: ALLERGY & IMMUNOLOGY

## 2019-12-10 PROCEDURE — 94010 BREATHING CAPACITY TEST: CPT | Performed by: ALLERGY & IMMUNOLOGY

## 2019-12-10 PROCEDURE — 82785 ASSAY OF IGE: CPT | Performed by: ALLERGY & IMMUNOLOGY

## 2019-12-10 RX ORDER — AZELASTINE 1 MG/ML
2 SPRAY, METERED NASAL 2 TIMES DAILY PRN
Qty: 30 ML | Refills: 3 | Status: SHIPPED | OUTPATIENT
Start: 2019-12-10 | End: 2021-04-13

## 2019-12-10 ASSESSMENT — ENCOUNTER SYMPTOMS
WHEEZING: 0
JOINT SWELLING: 0
MYALGIAS: 0
ARTHRALGIAS: 0
ADENOPATHY: 0
FEVER: 0
HEADACHES: 0
ACTIVITY CHANGE: 0
DIARRHEA: 0
NAUSEA: 0
SHORTNESS OF BREATH: 0
FACIAL SWELLING: 0
RHINORRHEA: 0
VOMITING: 0
CHEST TIGHTNESS: 0
FATIGUE: 0
SINUS PRESSURE: 0
COUGH: 0
EYE ITCHING: 0
EYE DISCHARGE: 0
EYE REDNESS: 0

## 2019-12-10 ASSESSMENT — MIFFLIN-ST. JEOR: SCORE: 1648.37

## 2019-12-10 NOTE — LETTER
12/10/2019         RE: Marco Stovall  [address] [facility] [address]        Dear Colleague,    Thank you for referring your patient, Marco Stovall, to the [facility]. Please see a copy of my visit note below.    SUBJECTIVE:                                                               Marco Stovall presents today to our Allergy Clinic at [facility] follow up visit.   He is a 54-year-old male with chronic rhinosinusitis with nasal polyposis, asthma, and aspirin sensitivity, consistent with aspirin exacerbated respiratory disease.  I saw the patient last time in 2018.   Nasal polyposis was started when he was 26 years old.  He was getting polypectomy every 4 to 5 years.  In his 40s, he started having asthma symptoms.  In his 30s, he took Excedrin for pain, within 30 to 60 minutes, he developed chest tightness, rhinoconjunctivitis symptoms, and vomiting.  He had several episodes like this.  He tolerates acetaminophen without issues.    Percutaneous skin puncture testing for aeroallergens performed on October 26, 2018 showed sensitivity to dust mite (Dermatophagoides pteronyssinus), grass pollen (Azeem grass and grass mix #7) tree pollen (Hackberry and Oak), weed pollen( ragweed mix, Russian thistle), and Alternaria mold.    In 2018, normal CBC with differential without hypereosinophilia noted.  Normal total serum IgE.    In 2018, his FEV1 was in high 50s-low 60s.  The patient was recommended to use Breo Ellipta 200/25 mcg 1 puff once daily and montelukast 10 mg by mouth once daily.  The last time I saw him was in October 2018.  Since then, he had bilateral maxillary antrostomies, total ethmoidectomies, sphenoidectomies, frontal sinusotomies, and bilateral nasal polypectomy on 12/03/2019.  Since then, he has not been using any nasal steroids.  He has a follow-up appointment with ENT in 1 week.  In the past, he was using azelastine with some good symptom  control.  Per our pharmacy, the patient has not picked up Breo Ellipta for 1 year.  He picked up montelukast twice for the last 6 months.   The patient states that his chest symptoms are well controlled. Marco is using albuterol HFA  less than twice per week for rescue from chest symptoms. he is waking up less than twice per month due to chest symptoms.  There has beenno use of oral steroids since last visit. No ED/PCP/urgent care/other specialist visits for asthma flare since the previous visit. The patient denies current chest tightness, cough, wheeze, or shortness of breath.         Patient Active Problem List   Diagnosis     Screening for hypertension     Chronic maxillary sinusitis     Hyperlipidemia LDL goal <130     Severe persistent asthma without complication     Nasal polyp     Genital herpes simplex, unspecified site     Samter's triad     Aspirin sensitivity     Seasonal allergic rhinitis due to pollen     Allergic rhinitis due to dust mite     Chronic pansinusitis     Nasal polyposis     History of endoscopic sinus surgery       Past Medical History:   Diagnosis Date     Mild intermittent asthma without complication 3/24/2016     Polyp of nasal cavity       Problem (# of Occurrences) Relation (Name,Age of Onset)    Diabetes (1) Maternal Grandfather    Hypertension (2) Mother, Father    Thyroid Disease (2) Mother: hypothyroidism, Daughter: hypothyroidism       Negative family history of: Asthma, C.A.D., Cerebrovascular Disease, Breast Cancer, Cancer - colorectal, Prostate Cancer        Past Surgical History:   Procedure Laterality Date     ENT SURGERY       ETHMOIDECTOMY  6/16/2014    Procedure: ETHMOIDECTOMY;  Surgeon: Jimbo Yang MD;  Location: [facility]     SURGICAL HISTORY OF -   2010    Nasal Polyp; 3 total surgeries: 1997     Social History     Socioeconomic History     Marital status:      Spouse name: None     Number of children: None     Years of education: None     Highest  education level: None   Occupational History     None   Social Needs     Financial resource strain: None     Food insecurity:     Worry: None     Inability: None     Transportation needs:     Medical: None     Non-medical: None   Tobacco Use     Smoking status: Never Smoker     Smokeless tobacco: Never Used   Substance and Sexual Activity     Alcohol use: Yes     Comment: wine or mixed 2 a month or more.     Drug use: No     Sexual activity: Yes     Partners: Female   Lifestyle     Physical activity:     Days per week: None     Minutes per session: None     Stress: None   Relationships     Social connections:     Talks on phone: None     Gets together: None     Attends [employer] service: None     Active member of club or organization: None     Attends meetings of clubs or organizations: None     Relationship status: None     Intimate partner violence:     Fear of current or ex partner: None     Emotionally abused: None     Physically abused: None     Forced sexual activity: None   Other Topics Concern     Parent/sibling w/ CABG, MI or angioplasty before 65F 55M? No      Service No     Blood Transfusions No     Caffeine Concern Yes     Comment: 2 cups a day     Occupational Exposure No     Hobby Hazards Yes     Comment: motorcycle     Sleep Concern No     Stress Concern No     Weight Concern No     Special Diet Yes     Comment: multi vit     Back Care No     Exercise No     Bike Helmet Yes     Seat Belt Yes     Self-Exams Not Asked   Social History Narrative    December 10, 2019    ENVIRONMENTAL HISTORY: [place] The home is heated with a forced air. They do have central air conditioning. The patient's bedroom is furnished with carpeting in bedroom and allergen mattress cover.  Pets inside the house include 1 cat. There is no history of cockroach or mice infestation. There are no smokers in the house.  The house does not have a damp basement.            Review  of Systems   Constitutional: Negative for activity change, fatigue and fever.   HENT: Negative for congestion, dental problem, ear pain, facial swelling, nosebleeds, postnasal drip, rhinorrhea, sinus pressure and sneezing.    Eyes: Negative for discharge, redness and itching.   Respiratory: Negative for cough, chest tightness, shortness of breath and wheezing.    Cardiovascular: Negative for chest pain.   Gastrointestinal: Negative for diarrhea, nausea and vomiting.   Musculoskeletal: Negative for arthralgias, joint swelling and myalgias.   Skin: Negative for rash.   Neurological: Negative for headaches.   Hematological: Negative for adenopathy.   Psychiatric/Behavioral: Negative for behavioral problems and self-injury.           Current Outpatient Medications:      acetaminophen (TYLENOL) 500 MG tablet, Take 2 tablets (1,000 mg) by mouth every 6 hours for 10 days Every 6 hours for post-op pain.  Alternate with ibuprofen., Disp: 200 tablet, Rfl: 1     amoxicillin-clavulanate (AUGMENTIN) 875-125 MG tablet, Take 1 tablet by mouth 2 times daily, Disp: 20 tablet, Rfl: 0     azelastine (ASTELIN) 0.1 % nasal spray, Spray 2 sprays into both nostrils 2 times daily as needed for rhinitis, Disp: 30 mL, Rfl: 3     fluticasone-vilanterol (BREO ELLIPTA) 200-25 MCG/INH inhaler, Inhale 1 puff into the lungs daily, Disp: 1 Inhaler, Rfl: 6     oxyCODONE (ROXICODONE) 5 MG tablet, Take 1 tablet (5 mg) by mouth every 6 hours as needed for severe pain Pain not controlled with acetaminophen or ibuprofen., Disp: 10 tablet, Rfl: 0     SENNA-docusate sodium (SENNA S) 8.6-50 MG tablet, Take 1 tablet by mouth At Bedtime Use while taking narcotic pain medications.  Hold for diarrhea., Disp: 30 tablet, Rfl: 1     sildenafil (REVATIO) 20 MG tablet, Take 1 tablet (20 mg) by mouth three times daily for pulmonary hypertension., Disp: 30 tablet, Rfl: 11     albuterol (PROAIR HFA/PROVENTIL HFA/VENTOLIN HFA) 108 (90 Base) MCG/ACT inhaler, Inhale 2  "puffs into the lungs every 4 hours as needed for shortness of breath / dyspnea (Patient not taking: Reported on 12/10/2019), Disp: 1 Inhaler, Rfl: 11     budesonide (PULMICORT) 0.5 MG/2ML neb solution, Place 0.5 mg/2 mL budesonide vial in 8 oz normal saline sinus rinse bottle.  Irrigate each nostril with one half of the bottle twice daily. (Patient not taking: Reported on 12/10/2019), Disp: 360 mL, Rfl: 3     montelukast (SINGULAIR) 10 MG tablet, Take 1 tablet (10 mg) by mouth At Bedtime (Patient not taking: Reported on 12/10/2019), Disp: 90 tablet, Rfl: 3     Multiple Vitamin (MULTIVITAMIN) per tablet, Take 1 tablet by mouth daily., Disp: 100 tablet, Rfl: 12     valACYclovir (VALTREX) 500 MG tablet, Take 2 tablets (1,000 mg) by mouth 2 times daily for 10 days, Disp: 40 tablet, Rfl: 5  Immunization History   Administered Date(s) Administered     HepB-Adult 04/02/2019     Influenza Vaccine IM > 6 months Valent IIV4 10/25/2013, 11/02/2017, 10/26/2018     Pneumococcal 23 valent 10/25/2013     TDAP Vaccine (Adacel) 10/11/2010     Allergies   Allergen Reactions     Advil [Ibuprofen Micronized] Other (See Comments)     Tight chest     Asa [Aspirin] Nausea and Vomiting     Asthma - tight chest     Hazelnuts [Nuts] Other (See Comments)     Chest tight. Positive SPT. Tolerates other tree nuts and peanut.     Naprosyn [Naproxen] GI Disturbance     Stomach      OBJECTIVE:                                                                 /84 (BP Location: Left arm, Patient Position: Sitting, Cuff Size: Adult Regular)   Pulse 69   Temp 97.1  F (36.2  C) (Tympanic)   Ht 1.727 m (5' 7.99\")   Wt 82.9 kg (182 lb 12.2 oz)   SpO2 97%   BMI 27.80 kg/m           Physical Exam  Vitals signs and nursing note reviewed.   Constitutional:       General: He is not in acute distress.     Appearance: He is not diaphoretic.   HENT:      Head: Normocephalic and atraumatic.      Right Ear: Tympanic membrane, ear canal and external ear " normal.      Left Ear: Tympanic membrane, ear canal and external ear normal.      Nose: No mucosal edema or rhinorrhea.      Right Turbinates: Not enlarged, swollen or pale.      Left Turbinates: Not enlarged, swollen or pale.      Comments: Dry bloody mucus in both nostrils.      Mouth/Throat:      Lips: Pink.      Mouth: Mucous membranes are moist.      Pharynx: Oropharynx is clear. No pharyngeal swelling, oropharyngeal exudate or posterior oropharyngeal erythema.   Eyes:      General:         Right eye: No discharge.         Left eye: No discharge.      Conjunctiva/sclera: Conjunctivae normal.   Cardiovascular:      Heart sounds: Normal heart sounds.   Pulmonary:      Effort: Pulmonary effort is normal. No respiratory distress.      Breath sounds: Normal breath sounds and air entry. No stridor, decreased air movement or transmitted upper airway sounds. No decreased breath sounds, wheezing, rhonchi or rales.   Musculoskeletal: Normal range of motion.   Lymphadenopathy:      Cervical: No cervical adenopathy.   Skin:     General: Skin is warm.      Capillary Refill: Capillary refill takes less than 2 seconds.   Neurological:      Mental Status: He is alert.   Psychiatric:         Mood and Affect: Mood normal.         Behavior: Behavior normal.           WORKUP:   SPIROMETRY       FVC 3.50L (75% of predicted).     FEV1 2.59L (72% of predicted).     FEV1/FVC 74%     FEF 25%-75%  1.95L/s (62% of predicted)    My interpretation: The office spirometry performed today suggests mild obstruction.      Asthma Control Test (ACT) total score: 25       ASSESSMENT/PLAN:      1. Severe persistent asthma without complication  (primary encounter diagnosis)  The patient states that he has been asymptomatic.  The office spirometry suggest mild obstruction; however, we could not obtain 3 matches.  His FEV1 was also 66 and 63, suggesting some air trapping.  In order to proceed with aspirin desensitization, he needs to have a stable  FEV1 above 70%.  -Start Breo Ellipta 200/25 mcg 1 puff once daily.  - Start consistently montelukast 10 mg by mouth once daily.      Spirometry, Breathing Capacity,         fluticasone-vilanterol (BREO ELLIPTA) 200-25 MCG/INH inhaler, IgE            2. Chronic pansinusitis 3. Nasal polyposis 4. Aspirin sensitivity 5. Samter's triad    The patient states that he is worried about the potential side effects of aspirin and needs to be compliant with the doses.  He admits that he easily could miss several days of aspirin.  Discussed the importance of compliance.  The patient is also interested in dupilumab as an alternative.  He would like to apply for it today and meanwhile having time to think about what he would prefer.   Dupilumab also could be an alternative to start in case if his lung function is not optimal before proceeding with aspirin desensitization.    6. Seasonal allergic rhinitis due to pollen 7. Allergic rhinitis due to dust mite    The patient will have a follow-up appointment with ENT.  -Will see if Dr. Swartz has a preference for an intranasal steroid as postop.  -He can use azelastine 2 sprays in each nostril twice daily as needed.    azelastine (ASTELIN) 0.1 % nasal spray            Return in about 4 weeks (around 1/7/2020), or if symptoms worsen or fail to improve.    Thank you for allowing us to participate in the care of this patient. Please feel free to contact us if there are any questions or concerns about the patient.    Disclaimer: This note consists of symbols derived from keyboarding, dictation and/or voice recognition software. As a result, there may be errors in the script that have gone undetected. Please consider this when interpreting information found in this chart.    Sudarshan Pierson MD, FAAAAI, FACAAI  Allergy, Asthma and Immunology  [place], [place] and [place]      Again, thank you for allowing me to participate in the care of your patient.         Sincerely,        Sudarshan Pierson MD

## 2019-12-10 NOTE — PROGRESS NOTES
SUBJECTIVE:                                                               Marco Stovall presents today to our [facility] at [facility] follow up visit.   He is a 54-year-old male with chronic rhinosinusitis with nasal polyposis, asthma, and aspirin sensitivity, consistent with aspirin exacerbated respiratory disease.  I saw the patient last time in 2018.   Nasal polyposis was started when he was 26 years old.  He was getting polypectomy every 4 to 5 years.  In his 40s, he started having asthma symptoms.  In his 30s, he took Excedrin for pain, within 30 to 60 minutes, he developed chest tightness, rhinoconjunctivitis symptoms, and vomiting.  He had several episodes like this.  He tolerates acetaminophen without issues.    Percutaneous skin puncture testing for aeroallergens performed on October 26, 2018 showed sensitivity to dust mite (Dermatophagoides pteronyssinus), grass pollen (Azeem grass and grass mix #7) tree pollen (Hackberry and Oak), weed pollen( ragweed mix, Russian thistle), and Alternaria mold.    In 2018, normal CBC with differential without hypereosinophilia noted.  Normal total serum IgE.    In 2018, his FEV1 was in high 50s-low 60s.  The patient was recommended to use Breo Ellipta 200/25 mcg 1 puff once daily and montelukast 10 mg by mouth once daily.  The last time I saw him was in October 2018.  Since then, he had bilateral maxillary antrostomies, total ethmoidectomies, sphenoidectomies, frontal sinusotomies, and bilateral nasal polypectomy on 12/03/2019.  Since then, he has not been using any nasal steroids.  He has a follow-up appointment with ENT in 1 week.  In the past, he was using azelastine with some good symptom control.  Per our pharmacy, the patient has not picked up Breo Ellipta for 1 year.  He picked up montelukast twice for the last 6 months.   The patient states that his chest symptoms are well controlled. Marco is using albuterol HFA  less than twice  per week for rescue from chest symptoms. he is waking up less than twice per month due to chest symptoms.  There has beenno use of oral steroids since last visit. No ED/PCP/urgent care/other specialist visits for asthma flare since the previous visit. The patient denies current chest tightness, cough, wheeze, or shortness of breath.         Patient Active Problem List   Diagnosis     Screening for hypertension     Chronic maxillary sinusitis     Hyperlipidemia LDL goal <130     Severe persistent asthma without complication     Nasal polyp     Genital herpes simplex, unspecified site     Samter's triad     Aspirin sensitivity     Seasonal allergic rhinitis due to pollen     Allergic rhinitis due to dust mite     Chronic pansinusitis     Nasal polyposis     History of endoscopic sinus surgery       Past Medical History:   Diagnosis Date     Mild intermittent asthma without complication 3/24/2016     Polyp of nasal cavity       Problem (# of Occurrences) Relation (Name,Age of Onset)    Diabetes (1) Maternal Grandfather    Hypertension (2) Mother, Father    Thyroid Disease (2) Mother: hypothyroidism, Daughter: hypothyroidism       Negative family history of: Asthma, C.A.D., Cerebrovascular Disease, Breast Cancer, Cancer - colorectal, Prostate Cancer        Past Surgical History:   Procedure Laterality Date     ENT SURGERY       ETHMOIDECTOMY  6/16/2014    Procedure: ETHMOIDECTOMY;  Surgeon: Jimbo Yang MD;  Location: [facility]     SURGICAL HISTORY OF -   2010    Nasal Polyp; 3 total surgeries: 1997     Social History     Socioeconomic History     Marital status:      Spouse name: None     Number of children: None     Years of education: None     Highest education level: None   Occupational History     None   Social Needs     Financial resource strain: None     Food insecurity:     Worry: None     Inability: None     Transportation needs:     Medical: None     Non-medical: None   Tobacco Use     Smoking  status: Never Smoker     Smokeless tobacco: Never Used   Substance and Sexual Activity     Alcohol use: Yes     Comment: wine or mixed 2 a month or more.     Drug use: No     Sexual activity: Yes     Partners: Female   Lifestyle     Physical activity:     Days per week: None     Minutes per session: None     Stress: None   Relationships     Social connections:     Talks on phone: None     Gets together: None     Attends Christian service: None     Active member of club or organization: None     Attends meetings of clubs or organizations: None     Relationship status: None     Intimate partner violence:     Fear of current or ex partner: None     Emotionally abused: None     Physically abused: None     Forced sexual activity: None   Other Topics Concern     Parent/sibling w/ CABG, MI or angioplasty before 65F 55M? No      Service No     Blood Transfusions No     Caffeine Concern Yes     Comment: 2 cups a day     Occupational Exposure No     Hobby Hazards Yes     Comment: motorcycle     Sleep Concern No     Stress Concern No     Weight Concern No     Special Diet Yes     Comment: multi vit     Back Care No     Exercise No     Bike Helmet Yes     Seat Belt Yes     Self-Exams Not Asked   Social History Narrative    December 10, 2019    ENVIRONMENTAL HISTORY: [place] The home is heated with a forced air. They do have central air conditioning. The patient's bedroom is furnished with carpeting in bedroom and allergen mattress cover.  Pets inside the house include 1 cat. There is no history of cockroach or mice infestation. There are no smokers in the house.  The house does not have a damp basement.            Review of Systems   Constitutional: Negative for activity change, fatigue and fever.   HENT: Negative for congestion, dental problem, ear pain, facial swelling, nosebleeds, postnasal drip, rhinorrhea, sinus pressure and sneezing.    Eyes: Negative for  discharge, redness and itching.   Respiratory: Negative for cough, chest tightness, shortness of breath and wheezing.    Cardiovascular: Negative for chest pain.   Gastrointestinal: Negative for diarrhea, nausea and vomiting.   Musculoskeletal: Negative for arthralgias, joint swelling and myalgias.   Skin: Negative for rash.   Neurological: Negative for headaches.   Hematological: Negative for adenopathy.   Psychiatric/Behavioral: Negative for behavioral problems and self-injury.           Current Outpatient Medications:      acetaminophen (TYLENOL) 500 MG tablet, Take 2 tablets (1,000 mg) by mouth every 6 hours for 10 days Every 6 hours for post-op pain.  Alternate with ibuprofen., Disp: 200 tablet, Rfl: 1     amoxicillin-clavulanate (AUGMENTIN) 875-125 MG tablet, Take 1 tablet by mouth 2 times daily, Disp: 20 tablet, Rfl: 0     azelastine (ASTELIN) 0.1 % nasal spray, Spray 2 sprays into both nostrils 2 times daily as needed for rhinitis, Disp: 30 mL, Rfl: 3     fluticasone-vilanterol (BREO ELLIPTA) 200-25 MCG/INH inhaler, Inhale 1 puff into the lungs daily, Disp: 1 Inhaler, Rfl: 6     oxyCODONE (ROXICODONE) 5 MG tablet, Take 1 tablet (5 mg) by mouth every 6 hours as needed for severe pain Pain not controlled with acetaminophen or ibuprofen., Disp: 10 tablet, Rfl: 0     SENNA-docusate sodium (SENNA S) 8.6-50 MG tablet, Take 1 tablet by mouth At Bedtime Use while taking narcotic pain medications.  Hold for diarrhea., Disp: 30 tablet, Rfl: 1     sildenafil (REVATIO) 20 MG tablet, Take 1 tablet (20 mg) by mouth three times daily for pulmonary hypertension., Disp: 30 tablet, Rfl: 11     albuterol (PROAIR HFA/PROVENTIL HFA/VENTOLIN HFA) 108 (90 Base) MCG/ACT inhaler, Inhale 2 puffs into the lungs every 4 hours as needed for shortness of breath / dyspnea (Patient not taking: Reported on 12/10/2019), Disp: 1 Inhaler, Rfl: 11     budesonide (PULMICORT) 0.5 MG/2ML neb solution, Place 0.5 mg/2 mL budesonide vial in 8 oz  "normal saline sinus rinse bottle.  Irrigate each nostril with one half of the bottle twice daily. (Patient not taking: Reported on 12/10/2019), Disp: 360 mL, Rfl: 3     montelukast (SINGULAIR) 10 MG tablet, Take 1 tablet (10 mg) by mouth At Bedtime (Patient not taking: Reported on 12/10/2019), Disp: 90 tablet, Rfl: 3     Multiple Vitamin (MULTIVITAMIN) per tablet, Take 1 tablet by mouth daily., Disp: 100 tablet, Rfl: 12     valACYclovir (VALTREX) 500 MG tablet, Take 2 tablets (1,000 mg) by mouth 2 times daily for 10 days, Disp: 40 tablet, Rfl: 5  Immunization History   Administered Date(s) Administered     HepB-Adult 04/02/2019     Influenza Vaccine IM > 6 months Valent IIV4 10/25/2013, 11/02/2017, 10/26/2018     Pneumococcal 23 valent 10/25/2013     TDAP Vaccine (Adacel) 10/11/2010     Allergies   Allergen Reactions     Advil [Ibuprofen Micronized] Other (See Comments)     Tight chest     Asa [Aspirin] Nausea and Vomiting     Asthma - tight chest     Hazelnuts [Nuts] Other (See Comments)     Chest tight. Positive SPT. Tolerates other tree nuts and peanut.     Naprosyn [Naproxen] GI Disturbance     Stomach      OBJECTIVE:                                                                 /84 (BP Location: Left arm, Patient Position: Sitting, Cuff Size: Adult Regular)   Pulse 69   Temp 97.1  F (36.2  C) (Tympanic)   Ht 1.727 m (5' 7.99\")   Wt 82.9 kg (182 lb 12.2 oz)   SpO2 97%   BMI 27.80 kg/m          Physical Exam  Vitals signs and nursing note reviewed.   Constitutional:       General: He is not in acute distress.     Appearance: He is not diaphoretic.   HENT:      Head: Normocephalic and atraumatic.      Right Ear: Tympanic membrane, ear canal and external ear normal.      Left Ear: Tympanic membrane, ear canal and external ear normal.      Nose: No mucosal edema or rhinorrhea.      Right Turbinates: Not enlarged, swollen or pale.      Left Turbinates: Not enlarged, swollen or pale.      Comments: Dry " bloody mucus in both nostrils.      Mouth/Throat:      Lips: Pink.      Mouth: Mucous membranes are moist.      Pharynx: Oropharynx is clear. No pharyngeal swelling, oropharyngeal exudate or posterior oropharyngeal erythema.   Eyes:      General:         Right eye: No discharge.         Left eye: No discharge.      Conjunctiva/sclera: Conjunctivae normal.   Cardiovascular:      Heart sounds: Normal heart sounds.   Pulmonary:      Effort: Pulmonary effort is normal. No respiratory distress.      Breath sounds: Normal breath sounds and air entry. No stridor, decreased air movement or transmitted upper airway sounds. No decreased breath sounds, wheezing, rhonchi or rales.   Musculoskeletal: Normal range of motion.   Lymphadenopathy:      Cervical: No cervical adenopathy.   Skin:     General: Skin is warm.      Capillary Refill: Capillary refill takes less than 2 seconds.   Neurological:      Mental Status: He is alert.   Psychiatric:         Mood and Affect: Mood normal.         Behavior: Behavior normal.           WORKUP:   SPIROMETRY       FVC 3.50L (75% of predicted).     FEV1 2.59L (72% of predicted).     FEV1/FVC 74%     FEF 25%-75%  1.95L/s (62% of predicted)    My interpretation: The office spirometry performed today suggests mild obstruction.      Asthma Control Test (ACT) total score: 25       ASSESSMENT/PLAN:      1. Severe persistent asthma without complication  (primary encounter diagnosis)  The patient states that he has been asymptomatic.  The office spirometry suggest mild obstruction; however, we could not obtain 3 matches.  His FEV1 was also 66 and 63, suggesting some air trapping.  In order to proceed with aspirin desensitization, he needs to have a stable FEV1 above 70%.  -Start Breo Ellipta 200/25 mcg 1 puff once daily.  - Start consistently montelukast 10 mg by mouth once daily.      Spirometry, Breathing Capacity,         fluticasone-vilanterol (BREO ELLIPTA) 200-25 MCG/INH inhaler, IgE             2. Chronic pansinusitis 3. Nasal polyposis 4. Aspirin sensitivity 5. Samter's triad    The patient states that he is worried about the potential side effects of aspirin and needs to be compliant with the doses.  He admits that he easily could miss several days of aspirin.  Discussed the importance of compliance.  The patient is also interested in dupilumab as an alternative.  He would like to apply for it today and meanwhile having time to think about what he would prefer.   Dupilumab also could be an alternative to start in case if his lung function is not optimal before proceeding with aspirin desensitization.    6. Seasonal allergic rhinitis due to pollen 7. Allergic rhinitis due to dust mite    The patient will have a follow-up appointment with ENT.  -Will see if Dr. Swartz has a preference for an intranasal steroid as postop.  -He can use azelastine 2 sprays in each nostril twice daily as needed.    azelastine (ASTELIN) 0.1 % nasal spray            Return in about 4 weeks (around 1/7/2020), or if symptoms worsen or fail to improve.    Thank you for allowing us to participate in the care of this patient. Please feel free to contact us if there are any questions or concerns about the patient.    Disclaimer: This note consists of symbols derived from keyboarding, dictation and/or voice recognition software. As a result, there may be errors in the script that have gone undetected. Please consider this when interpreting information found in this chart.    Sudarshan Pierson MD, FAAAAI, FACAAI  Allergy, Asthma and Immunology  [place], [place] and [place]

## 2019-12-10 NOTE — PATIENT INSTRUCTIONS
Start Breo 1 puff once daily.  -Continue using albuterol inhaler 2-4 puffs every 4-6 hours as needed for chest tightness/wheezing/shortness of breath/persistent cough.  -Use all MDI inhalers with spacer/chamber device.  -start Singulair (montelukast) 10 mg by mouth daily at bedtime.    -Use azelastine 2 sprays in each nostril twice a day when necessary.    Let's see what nasal steroids ENT prefers.

## 2019-12-10 NOTE — TELEPHONE ENCOUNTER
[employer] forms filled out and sent to [phone]. confimr via Rt Fax. Will await response.     Brandee Chaudhary RN

## 2019-12-11 LAB — IGE SERPL-ACNC: 119 KIU/L (ref 0–114)

## 2019-12-11 ASSESSMENT — ASTHMA QUESTIONNAIRES: ACT_TOTALSCORE: 25

## 2019-12-12 NOTE — RESULT ENCOUNTER NOTE
[employer] message sent:  Elevated total serum IgE, which is not uncommon for the patients with food allergies, eczema, allergic rhinitis, and/or asthma.   There is nothing to do about this level.  It was done to have a baseline before initiation of dupilumab.

## 2019-12-13 NOTE — TELEPHONE ENCOUNTER
Response from [employer]. They contacted [employer] at [phone] and confirmed that benefits and PRior Authorization requirements for pt. Requesting that we contact [employer] as they will can't release benefits to a third party. Once we have obtained the benefits for the pr we will need to triage the Rx to the designated Specialty Pharmacy which is currently undisclosed to [employer].     Historical prescription placed with diagnosis'.     Please move forward with prior authorization. Thank you.    Brandee MOY   Allergy RN

## 2019-12-13 NOTE — TELEPHONE ENCOUNTER
PA Initiation    Medication: Dupixent  Insurance Company: [employer] - Phone [phone] Fax [phone]  Pharmacy Filling the Rx:  Unknown  Filling Pharmacy Phone:    Filling Pharmacy Fax:    Start Date: 12/13/2019    Central Prior Authorization Team   Phone: [phone]

## 2019-12-16 NOTE — TELEPHONE ENCOUNTER
Prior Authorization Approval    Authorization Effective Date: 12/16/2019  Authorization Expiration Date: 6/16/2020  Medication: Dupixent  Approved Dose/Quantity: 2  Reference #:     Insurance Company: [employer] - Phone [phone] Fax [phone]  Which Pharmacy is filling the prescription (Not needed for infusion/clinic administered):      Routing back to clinic as PA team does not handle [employer] forms

## 2019-12-16 NOTE — PROGRESS NOTES
Chief Complaint   Patient presents with     Post-op Visit     Post op sinus/12-3-19     History of Present Illness  Marco Stovall is a 54 year old malewho presents today for follow-up.  The patient went to the operating room on 12/03/2019 for bilateral endoscopic sinus surgery.  The patient has done well postoperatively with minimal pain.  The patient does report some congestion but no significant nasal bleeding.  The patient has been rinsing at least daily with budesonide irrigations.  The patient is doing well and has no other ENT related concerns.    Past Medical History  Patient Active Problem List   Diagnosis     Screening for hypertension     Chronic maxillary sinusitis     Hyperlipidemia LDL goal <130     Severe persistent asthma without complication     Nasal polyp     Genital herpes simplex, unspecified site     Samter's triad     Aspirin sensitivity     Seasonal allergic rhinitis due to pollen     Allergic rhinitis due to dust mite     Chronic pansinusitis     Nasal polyposis     History of endoscopic sinus surgery     Current Medications    Current Outpatient Medications:      albuterol (PROAIR HFA/PROVENTIL HFA/VENTOLIN HFA) 108 (90 Base) MCG/ACT inhaler, Inhale 2 puffs into the lungs every 4 hours as needed for shortness of breath / dyspnea, Disp: 1 Inhaler, Rfl: 11     azelastine (ASTELIN) 0.1 % nasal spray, Spray 2 sprays into both nostrils 2 times daily as needed for rhinitis, Disp: 30 mL, Rfl: 3     budesonide (PULMICORT) 0.5 MG/2ML neb solution, Place 0.5 mg/2 mL budesonide vial in 8 oz normal saline sinus rinse bottle.  Irrigate each nostril with one half of the bottle twice daily., Disp: 360 mL, Rfl: 3     Dupilumab (DUPIXENT) 300 MG/2ML syringe, Inject 2 mLs (300 mg) Subcutaneous every 14 days, Disp: 2 Syringe, Rfl: 11     fluticasone-vilanterol (BREO ELLIPTA) 200-25 MCG/INH inhaler, Inhale 1 puff into the lungs daily, Disp: 1 Inhaler, Rfl: 6     montelukast (SINGULAIR) 10 MG tablet, Take 1  tablet (10 mg) by mouth At Bedtime, Disp: 90 tablet, Rfl: 3     Multiple Vitamin (MULTIVITAMIN) per tablet, Take 1 tablet by mouth daily., Disp: 100 tablet, Rfl: 12     SENNA-docusate sodium (SENNA S) 8.6-50 MG tablet, Take 1 tablet by mouth At Bedtime Use while taking narcotic pain medications.  Hold for diarrhea., Disp: 30 tablet, Rfl: 1     sildenafil (REVATIO) 20 MG tablet, Take 1 tablet (20 mg) by mouth three times daily for pulmonary hypertension., Disp: 30 tablet, Rfl: 11     valACYclovir (VALTREX) 500 MG tablet, Take 2 tablets (1,000 mg) by mouth 2 times daily for 10 days, Disp: 40 tablet, Rfl: 5    Allergies  Allergies   Allergen Reactions     Advil [Ibuprofen Micronized] Other (See Comments)     Tight chest     Asa [Aspirin] Nausea and Vomiting     Asthma - tight chest     Hazelnuts [Nuts] Other (See Comments)     Chest tight. Positive SPT. Tolerates other tree nuts and peanut.     Naprosyn [Naproxen] GI Disturbance     Stomach        Social History  Social History     Socioeconomic History     Marital status:      Spouse name: Not on file     Number of children: Not on file     Years of education: Not on file     Highest education level: Not on file   Occupational History     Not on file   Social Needs     Financial resource strain: Not on file     Food insecurity:     Worry: Not on file     Inability: Not on file     Transportation needs:     Medical: Not on file     Non-medical: Not on file   Tobacco Use     Smoking status: Never Smoker     Smokeless tobacco: Never Used   Substance and Sexual Activity     Alcohol use: Yes     Comment: wine or mixed 2 a month or more.     Drug use: No     Sexual activity: Yes     Partners: Female   Lifestyle     Physical activity:     Days per week: Not on file     Minutes per session: Not on file     Stress: Not on file   Relationships     Social connections:     Talks on phone: Not on file     Gets together: Not on file     Attends Voodoo service: Not on  file     Active member of club or organization: Not on file     Attends meetings of clubs or organizations: Not on file     Relationship status: Not on file     Intimate partner violence:     Fear of current or ex partner: Not on file     Emotionally abused: Not on file     Physically abused: Not on file     Forced sexual activity: Not on file   Other Topics Concern     Parent/sibling w/ CABG, MI or angioplasty before 65F 55M? No      Service No     Blood Transfusions No     Caffeine Concern Yes     Comment: 2 cups a day     Occupational Exposure No     Hobby Hazards Yes     Comment: motorcycle     Sleep Concern No     Stress Concern No     Weight Concern No     Special Diet Yes     Comment: multi vit     Back Care No     Exercise No     Bike Helmet Yes     Seat Belt Yes     Self-Exams Not Asked   Social History Narrative    December 10, 2019    ENVIRONMENTAL HISTORY: [place] The home is heated with a forced air. They do have central air conditioning. The patient's bedroom is furnished with carpeting in bedroom and allergen mattress cover.  Pets inside the house include 1 cat. There is no history of cockroach or mice infestation. There are no smokers in the house.  The house does not have a damp basement.        Family History  Family History   Problem Relation Age of Onset     Hypertension Mother      Thyroid Disease Mother         hypothyroidism     Hypertension Father      Diabetes Maternal Grandfather      Thyroid Disease Daughter         hypothyroidism     Asthma No family hx of      C.A.D. No family hx of      Cerebrovascular Disease No family hx of      Breast Cancer No family hx of      Cancer - colorectal No family hx of      Prostate Cancer No family hx of        Review of Systems  As per HPI and PMHx, otherwise 10 system review including the head and neck, constitutional, eyes, respiratory, GI, skin, neurologic, lymphatic, endocrine, and allergy  "systems is negative.    Physical Exam  /73 (BP Location: Right arm, Patient Position: Sitting, Cuff Size: Adult Regular)   Pulse 62   Temp 97.7  F (36.5  C) (Oral)   Ht 1.727 m (5' 8\")   Wt 82.6 kg (182 lb)   BMI 27.67 kg/m    GENERAL: Patient is a pleasant, cooperative 54 year old male in no acute distress.  HEAD: Normocephalic, atraumatic.  Hair and scalp are normal.  EYES: Pupils are equal, round, reactive to light and accommodation.  Extraocular movements are intact.  The sclera nonicteric without injection.  The extraocular structures are normal.  EARS: Normal shape and symmetry.  No tenderness when palpating the mastoid or tragal areas bilaterally.    NOSE: Nares are patent.  Nasal mucosa is pink and moist.  Negative anterior rhinoscopy.  NEUROLOGIC: Cranial nerves II through XII are grossly intact.  Voice is strong.  Patient is House-Brackman I/VI bilaterally.  CARDIOVASCULAR: Extremities are warm and well-perfused.  No significant peripheral edema.  RESPIRATORY: Patient has nonlabored breathing without cough, wheeze, stridor.  PSYCHIATRIC: Patient is alert and oriented.  Mood and affect appear normal.  SKIN: Warm and dry.  No scalp, face, or neck lesions noted.    Procedure: Nasal sinus debridement   Indication: Status post endoscopic sinus surgery    To improve medication delivery and assist with healing following sinus surgery, the nasal cavities were examined and debrided.  I proceeded with rigid nasal endoscopic examination and debridement.  The bilateral nasal cavities were anesthetized with 4% lidocaine and phenylephrine spray.  The bilateral nasal cavities were visualized with a 30 degree rigid endoscope.  The right nasal cavity was debrided with a rigid suction, bayonet forceps, and Blakesley forceps.  The patient had a widely patent maxillary antrostomy and ethmoid cavity.  The sphenoethmoid recess was clear.  The frontal sinus outflow area was clear.  The middle turbinate was surgically " absent. Attention was turned to the left.  The left nasal cavity was debrided with a rigid suction, bayonet forceps, and Blakesley forceps.  The patient had a widely patent maxillary antrostomy and ethmoid cavity.  The sphenoethmoid recess was clear.  The frontal sinus outflow area was clear.  The middle turbinate was surgically absent.  The scope was removed.  The patient tolerated the procedure well.    Assessment and Plan     ICD-10-CM    1. Chronic pansinusitis J32.4 Nasal/Sinus Endos W/B   2. Nasal polyposis J33.9 Nasal/Sinus Endos W/B   3. Samter's triad J45.909 Nasal/Sinus Endos W/B    J33.9     Z88.6    4. Severe persistent asthma without complication J45.50 Nasal/Sinus Endos W/B   5. Status post functional endoscopic sinus surgery Z98.890 Nasal/Sinus Endos W/B   6. Postoperative state Z98.890 Nasal/Sinus Endos W/B      It was my pleasure seeing Marco Stovall today in clinic.  The patient is healing well after their endoscopic sinus surgery.  We discussed returning to normal nose blowing and normal activity.  I will have him continue the saline rinses with budesonide.  He will follow-up with allergy for consideration of aspirin desensitization or possibly a biologic per allergy.  I would like to see the patient back in 6 weeks for recheck.  Patient knows to contact me sooner with any problems or concerns.    Josh Swartz MD  Department of Otolarygology-Head and Neck Surgery  [facility]

## 2019-12-17 ENCOUNTER — OFFICE VISIT (OUTPATIENT)
Dept: OTOLARYNGOLOGY | Facility: CLINIC | Age: 54
End: 2019-12-17
Payer: COMMERCIAL

## 2019-12-17 VITALS
BODY MASS INDEX: 27.58 KG/M2 | SYSTOLIC BLOOD PRESSURE: 122 MMHG | DIASTOLIC BLOOD PRESSURE: 73 MMHG | HEIGHT: 68 IN | HEART RATE: 62 BPM | WEIGHT: 182 LBS | TEMPERATURE: 97.7 F

## 2019-12-17 DIAGNOSIS — J33.9 NASAL POLYPOSIS: ICD-10-CM

## 2019-12-17 DIAGNOSIS — J45.50 SEVERE PERSISTENT ASTHMA WITHOUT COMPLICATION (H): ICD-10-CM

## 2019-12-17 DIAGNOSIS — J45.909 SAMTER'S TRIAD: ICD-10-CM

## 2019-12-17 DIAGNOSIS — J33.9 SAMTER'S TRIAD: ICD-10-CM

## 2019-12-17 DIAGNOSIS — Z88.6 SAMTER'S TRIAD: ICD-10-CM

## 2019-12-17 DIAGNOSIS — J32.4 CHRONIC PANSINUSITIS: Primary | ICD-10-CM

## 2019-12-17 DIAGNOSIS — Z98.890 STATUS POST FUNCTIONAL ENDOSCOPIC SINUS SURGERY: ICD-10-CM

## 2019-12-17 DIAGNOSIS — Z98.890 POSTOPERATIVE STATE: ICD-10-CM

## 2019-12-17 PROCEDURE — 31237 NSL/SINS NDSC SURG BX POLYPC: CPT | Mod: 50 | Performed by: OTOLARYNGOLOGY

## 2019-12-17 PROCEDURE — 99214 OFFICE O/P EST MOD 30 MIN: CPT | Mod: 25 | Performed by: OTOLARYNGOLOGY

## 2019-12-17 ASSESSMENT — MIFFLIN-ST. JEOR: SCORE: 1645.05

## 2019-12-17 NOTE — LETTER
12/17/2019         RE: Marco Stovall  [id number] [facility] [address]        Dear Colleague,    Thank you for referring your patient, Marco Stovall, to the [facility]. Please see a copy of my visit note below.    Chief Complaint   Patient presents with     Post-op Visit     Post op sinus/12-3-19     History of Present Illness  Marco Stovall is a 54 year old malewho presents today for follow-up.  The patient went to the operating room on 12/03/2019 for bilateral endoscopic sinus surgery.  The patient has done well postoperatively with minimal pain.  The patient does report some congestion but no significant nasal bleeding.  The patient has been rinsing at least daily with budesonide irrigations.  The patient is doing well and has no other ENT related concerns.    Past Medical History  Patient Active Problem List   Diagnosis     Screening for hypertension     Chronic maxillary sinusitis     Hyperlipidemia LDL goal <130     Severe persistent asthma without complication     Nasal polyp     Genital herpes simplex, unspecified site     Samter's triad     Aspirin sensitivity     Seasonal allergic rhinitis due to pollen     Allergic rhinitis due to dust mite     Chronic pansinusitis     Nasal polyposis     History of endoscopic sinus surgery     Current Medications    Current Outpatient Medications:      albuterol (PROAIR HFA/PROVENTIL HFA/VENTOLIN HFA) 108 (90 Base) MCG/ACT inhaler, Inhale 2 puffs into the lungs every 4 hours as needed for shortness of breath / dyspnea, Disp: 1 Inhaler, Rfl: 11     azelastine (ASTELIN) 0.1 % nasal spray, Spray 2 sprays into both nostrils 2 times daily as needed for rhinitis, Disp: 30 mL, Rfl: 3     budesonide (PULMICORT) 0.5 MG/2ML neb solution, Place 0.5 mg/2 mL budesonide vial in 8 oz normal saline sinus rinse bottle.  Irrigate each nostril with one half of the bottle twice daily., Disp: 360 mL, Rfl: 3     Dupilumab (DUPIXENT) 300 MG/2ML syringe,  Inject 2 mLs (300 mg) Subcutaneous every 14 days, Disp: 2 Syringe, Rfl: 11     fluticasone-vilanterol (BREO ELLIPTA) 200-25 MCG/INH inhaler, Inhale 1 puff into the lungs daily, Disp: 1 Inhaler, Rfl: 6     montelukast (SINGULAIR) 10 MG tablet, Take 1 tablet (10 mg) by mouth At Bedtime, Disp: 90 tablet, Rfl: 3     Multiple Vitamin (MULTIVITAMIN) per tablet, Take 1 tablet by mouth daily., Disp: 100 tablet, Rfl: 12     SENNA-docusate sodium (SENNA S) 8.6-50 MG tablet, Take 1 tablet by mouth At Bedtime Use while taking narcotic pain medications.  Hold for diarrhea., Disp: 30 tablet, Rfl: 1     sildenafil (REVATIO) 20 MG tablet, Take 1 tablet (20 mg) by mouth three times daily for pulmonary hypertension., Disp: 30 tablet, Rfl: 11     valACYclovir (VALTREX) 500 MG tablet, Take 2 tablets (1,000 mg) by mouth 2 times daily for 10 days, Disp: 40 tablet, Rfl: 5    Allergies  Allergies   Allergen Reactions     Advil [Ibuprofen Micronized] Other (See Comments)     Tight chest     Asa [Aspirin] Nausea and Vomiting     Asthma - tight chest     Hazelnuts [Nuts] Other (See Comments)     Chest tight. Positive SPT. Tolerates other tree nuts and peanut.     Naprosyn [Naproxen] GI Disturbance     Stomach        Social History  Social History     Socioeconomic History     Marital status:      Spouse name: Not on file     Number of children: Not on file     Years of education: Not on file     Highest education level: Not on file   Occupational History     Not on file   Social Needs     Financial resource strain: Not on file     Food insecurity:     Worry: Not on file     Inability: Not on file     Transportation needs:     Medical: Not on file     Non-medical: Not on file   Tobacco Use     Smoking status: Never Smoker     Smokeless tobacco: Never Used   Substance and Sexual Activity     Alcohol use: Yes     Comment: wine or mixed 2 a month or more.     Drug use: No     Sexual activity: Yes     Partners: Female   Lifestyle      Physical activity:     Days per week: Not on file     Minutes per session: Not on file     Stress: Not on file   Relationships     Social connections:     Talks on phone: Not on file     Gets together: Not on file     Attends Temple service: Not on file     Active member of club or organization: Not on file     Attends meetings of clubs or organizations: Not on file     Relationship status: Not on file     Intimate partner violence:     Fear of current or ex partner: Not on file     Emotionally abused: Not on file     Physically abused: Not on file     Forced sexual activity: Not on file   Other Topics Concern     Parent/sibling w/ CABG, MI or angioplasty before 65F 55M? No      Service No     Blood Transfusions No     Caffeine Concern Yes     Comment: 2 cups a day     Occupational Exposure No     Hobby Hazards Yes     Comment: motorcycle     Sleep Concern No     Stress Concern No     Weight Concern No     Special Diet Yes     Comment: multi vit     Back Care No     Exercise No     Bike Helmet Yes     Seat Belt Yes     Self-Exams Not Asked   Social History Narrative    December 10, 2019    ENVIRONMENTAL HISTORY: [place] The home is heated with a forced air. They do have central air conditioning. The patient's bedroom is furnished with carpeting in bedroom and allergen mattress cover.  Pets inside the house include 1 cat. There is no history of cockroach or mice infestation. There are no smokers in the house.  The house does not have a damp basement.        Family History  Family History   Problem Relation Age of Onset     Hypertension Mother      Thyroid Disease Mother         hypothyroidism     Hypertension Father      Diabetes Maternal Grandfather      Thyroid Disease Daughter         hypothyroidism     Asthma No family hx of      C.A.D. No family hx of      Cerebrovascular Disease No family hx of      Breast Cancer No family hx of      Cancer -  "colorectal No family hx of      Prostate Cancer No family hx of        Review of Systems  As per HPI and PMHx, otherwise 10 system review including the head and neck, constitutional, eyes, respiratory, GI, skin, neurologic, lymphatic, endocrine, and allergy systems is negative.    Physical Exam  /73 (BP Location: Right arm, Patient Position: Sitting, Cuff Size: Adult Regular)   Pulse 62   Temp 97.7  F (36.5  C) (Oral)   Ht 1.727 m (5' 8\")   Wt 82.6 kg (182 lb)   BMI 27.67 kg/m     GENERAL: Patient is a pleasant, cooperative 54 year old male in no acute distress.  HEAD: Normocephalic, atraumatic.  Hair and scalp are normal.  EYES: Pupils are equal, round, reactive to light and accommodation.  Extraocular movements are intact.  The sclera nonicteric without injection.  The extraocular structures are normal.  EARS: Normal shape and symmetry.  No tenderness when palpating the mastoid or tragal areas bilaterally.    NOSE: Nares are patent.  Nasal mucosa is pink and moist.  Negative anterior rhinoscopy.  NEUROLOGIC: Cranial nerves II through XII are grossly intact.  Voice is strong.  Patient is House-Brackman I/VI bilaterally.  CARDIOVASCULAR: Extremities are warm and well-perfused.  No significant peripheral edema.  RESPIRATORY: Patient has nonlabored breathing without cough, wheeze, stridor.  PSYCHIATRIC: Patient is alert and oriented.  Mood and affect appear normal.  SKIN: Warm and dry.  No scalp, face, or neck lesions noted.    Procedure: Nasal sinus debridement   Indication: Status post endoscopic sinus surgery    To improve medication delivery and assist with healing following sinus surgery, the nasal cavities were examined and debrided.  I proceeded with rigid nasal endoscopic examination and debridement.  The bilateral nasal cavities were anesthetized with 4% lidocaine and phenylephrine spray.  The bilateral nasal cavities were visualized with a 30 degree rigid endoscope.  The right nasal cavity was " debrided with a rigid suction, bayonet forceps, and Blakesley forceps.  The patient had a widely patent maxillary antrostomy and ethmoid cavity.  The sphenoethmoid recess was clear.  The frontal sinus outflow area was clear.  The middle turbinate was surgically absent. Attention was turned to the left.  The left nasal cavity was debrided with a rigid suction, bayonet forceps, and Blakesley forceps.  The patient had a widely patent maxillary antrostomy and ethmoid cavity.  The sphenoethmoid recess was clear.  The frontal sinus outflow area was clear.  The middle turbinate was surgically absent.  The scope was removed.  The patient tolerated the procedure well.    Assessment and Plan     ICD-10-CM    1. Chronic pansinusitis J32.4 Nasal/Sinus Endos W/B   2. Nasal polyposis J33.9 Nasal/Sinus Endos W/B   3. Samter's triad J45.909 Nasal/Sinus Endos W/B    J33.9     Z88.6    4. Severe persistent asthma without complication J45.50 Nasal/Sinus Endos W/B   5. Status post functional endoscopic sinus surgery Z98.890 Nasal/Sinus Endos W/B   6. Postoperative state Z98.890 Nasal/Sinus Endos W/B      It was my pleasure seeing Marco Stovall today in clinic.  The patient is healing well after their endoscopic sinus surgery.  We discussed returning to normal nose blowing and normal activity.  I will have him continue the saline rinses with budesonide.  He will follow-up with allergy for consideration of aspirin desensitization or possibly a biologic per allergy.  I would like to see the patient back in 6 weeks for recheck.  Patient knows to contact me sooner with any problems or concerns.    Josh Swartz MD  Department of Otolarygology-Head and Neck Surgery  [facility]       Again, thank you for allowing me to participate in the care of your patient.        Sincerely,        Josh Swartz MD

## 2019-12-17 NOTE — PATIENT INSTRUCTIONS
Per physician instructions      If you have questions or concerns on any instructions given to you by your provider today or if you need to schedule an appointment, you can reach us at [phone].

## 2019-12-17 NOTE — PROGRESS NOTES
This laryngoscopy scope was used on this patient.    Rigid    Nathen Storz Rigid #[id number] Pediatric/Adult/Post-op sinus  Vidhya Beltre CMA

## 2019-12-17 NOTE — NURSING NOTE
"Initial /73 (BP Location: Right arm, Patient Position: Sitting, Cuff Size: Adult Regular)   Pulse 62   Temp 97.7  F (36.5  C) (Oral)   Ht 1.727 m (5' 8\")   Wt 82.6 kg (182 lb)   BMI 27.67 kg/m   Estimated body mass index is 27.67 kg/m  as calculated from the following:    Height as of this encounter: 1.727 m (5' 8\").    Weight as of this encounter: 82.6 kg (182 lb). .    Vidhya Beltre CMA    "

## 2020-01-10 ENCOUNTER — OFFICE VISIT (OUTPATIENT)
Dept: ALLERGY | Facility: CLINIC | Age: 55
End: 2020-01-10
Payer: COMMERCIAL

## 2020-01-10 ENCOUNTER — TELEPHONE (OUTPATIENT)
Dept: ALLERGY | Facility: CLINIC | Age: 55
End: 2020-01-10

## 2020-01-10 VITALS
HEART RATE: 68 BPM | OXYGEN SATURATION: 96 % | DIASTOLIC BLOOD PRESSURE: 77 MMHG | WEIGHT: 186.07 LBS | TEMPERATURE: 97 F | BODY MASS INDEX: 28.29 KG/M2 | SYSTOLIC BLOOD PRESSURE: 129 MMHG

## 2020-01-10 DIAGNOSIS — Z88.6 SAMTER'S TRIAD: ICD-10-CM

## 2020-01-10 DIAGNOSIS — J45.50 SEVERE PERSISTENT ASTHMA WITHOUT COMPLICATION (H): ICD-10-CM

## 2020-01-10 DIAGNOSIS — J33.9 SAMTER'S TRIAD: ICD-10-CM

## 2020-01-10 DIAGNOSIS — J45.909 SAMTER'S TRIAD: ICD-10-CM

## 2020-01-10 DIAGNOSIS — J33.9 NASAL POLYPOSIS: Primary | ICD-10-CM

## 2020-01-10 PROCEDURE — 99214 OFFICE O/P EST MOD 30 MIN: CPT | Performed by: ALLERGY & IMMUNOLOGY

## 2020-01-10 ASSESSMENT — ENCOUNTER SYMPTOMS
FATIGUE: 0
NAUSEA: 0
FEVER: 0
COUGH: 0
FACIAL SWELLING: 0
MYALGIAS: 0
EYE REDNESS: 0
ARTHRALGIAS: 0
VOMITING: 0
SHORTNESS OF BREATH: 0
RHINORRHEA: 0
ACTIVITY CHANGE: 0
CHEST TIGHTNESS: 0
DIARRHEA: 0
SINUS PRESSURE: 0
EYE DISCHARGE: 0
EYE ITCHING: 0
HEADACHES: 0
JOINT SWELLING: 0
WHEEZING: 0
ADENOPATHY: 0

## 2020-01-10 NOTE — PROGRESS NOTES
"SUBJECTIVE:                                                               Marco Stovall presents today to our Allergy Clinic at [facility] for a follow up visit.    He is a 54-year-old male with chronic rhinosinusitis with nasal polyposis, asthma, and aspirin sensitivity, consistent with aspirin exacerbated respiratory disease.  I saw the patient last time in 2018.   Nasal polyposis was started when he was 26 years old.  He was getting polypectomy every 4 to 5 years.  In his 40s, he started having asthma symptoms.  In his 30s, he took Excedrin for pain, within 30 to 60 minutes, he developed chest tightness, rhinoconjunctivitis symptoms, and vomiting.  He had several episodes like this.  He tolerates acetaminophen without issues.     Percutaneous skin puncture testing for aeroallergens performed on October 26, 2018 showed sensitivity to dust mite (Dermatophagoides pteronyssinus), grass pollen (Azeem grass and grass mix #7) tree pollen (Hackberry and Oak), weed pollen( ragweed mix, Russian thistle), and Alternaria mold.     In 2018, normal CBC with differential without hypereosinophilia noted.  Normal total serum IgE.     In 2018, his FEV1 was in high 50s-low 60s.  The patient was recommended to use Breo Ellipta 200/25 mcg 1 puff once daily and montelukast 10 mg by mouth once daily.    He received his first 2 doses of dupilumab 300 mg every 2 weeks. States he felt his body was \"sore\" for a day after the injection, and then he was fine.   He has been using Breo Ellipta 200/25 mcg 1 puff once daily and montelukast consistently.   The patient denies current chest tightness, cough, wheeze, or shortness of breath.   Asthma triggers remain unchanged. He uses a spacer/chamber device, where applicable.  He continues using nasal rinses with budesonide daily, prescribed by Dr. Swartz. He also uses azelastine as needed.   The patient denies clear rhinorrhea, nasal itch, stuffiness, sneezing, or interval " sinusitis symptoms of fever, facial pain, or purulent rhinorrhea.  At this point, he wants to see how effective is Dupixent without doing ASA desensitization. He had several additional questions about the possible side effects of dupilumab, which I answered.       Patient Active Problem List   Diagnosis     Screening for hypertension     Chronic maxillary sinusitis     Hyperlipidemia LDL goal <130     Severe persistent asthma without complication     Nasal polyp     Genital herpes simplex, unspecified site     Samter's triad     Aspirin sensitivity     Seasonal allergic rhinitis due to pollen     Allergic rhinitis due to dust mite     Chronic pansinusitis     Nasal polyposis     History of endoscopic sinus surgery       Past Medical History:   Diagnosis Date     Mild intermittent asthma without complication 3/24/2016     Polyp of nasal cavity       Problem (# of Occurrences) Relation (Name,Age of Onset)    Diabetes (1) Maternal Grandfather    Hypertension (2) Mother, Father    Thyroid Disease (2) Mother: hypothyroidism, Daughter: hypothyroidism       Negative family history of: Asthma, C.A.D., Cerebrovascular Disease, Breast Cancer, Cancer - colorectal, Prostate Cancer        Past Surgical History:   Procedure Laterality Date     ENT SURGERY       ETHMOIDECTOMY  6/16/2014    Procedure: ETHMOIDECTOMY;  Surgeon: Jimbo Yang MD;  Location: [place] OR     "[employer] SYSTEM ENDOSCOPIC SINUS SURGERY Bilateral 12/3/2019    Procedure: Bilateral endoscopic maxillary antrostomies with tissue removal, bilateral endoscopic total ethmoidectomies, bilateral endoscopic sphenoidotomies, bilateral endoscopic frontal sinusotomies, bilateral endoscopic nasal polypectomy, image guidance;  Surgeon: Josh Swartz MD;  Location:  OR     SURGICAL HISTORY OF -   2010    Nasal Polyp; 3 total surgeries: 1997     Social History     Socioeconomic History     Marital status:      Spouse name: None     Number of children:  None     Years of education: None     Highest education level: None   Occupational History     None   Social Needs     Financial resource strain: None     Food insecurity:     Worry: None     Inability: None     Transportation needs:     Medical: None     Non-medical: None   Tobacco Use     Smoking status: Never Smoker     Smokeless tobacco: Never Used   Substance and Sexual Activity     Alcohol use: Yes     Comment: wine or mixed 2 a month or more.     Drug use: No     Sexual activity: Yes     Partners: Female   Lifestyle     Physical activity:     Days per week: None     Minutes per session: None     Stress: None   Relationships     Social connections:     Talks on phone: None     Gets together: None     Attends Sabianist service: None     Active member of club or organization: None     Attends meetings of clubs or organizations: None     Relationship status: None     Intimate partner violence:     Fear of current or ex partner: None     Emotionally abused: None     Physically abused: None     Forced sexual activity: None   Other Topics Concern     Parent/sibling w/ CABG, MI or angioplasty before 65F 55M? No      Service No     Blood Transfusions No     Caffeine Concern Yes     Comment: 2 cups a day     Occupational Exposure No     Hobby Hazards Yes     Comment: motorcycle     Sleep Concern No     Stress Concern No     Weight Concern No     Special Diet Yes     Comment: multi vit     Back Care No     Exercise No     Bike Helmet Yes     Seat Belt Yes     Self-Exams Not Asked   Social History Narrative    January 10, 2020    ENVIRONMENTAL HISTORY: [place] The home is heated with a forced air. They do have central air conditioning. The patient's bedroom is furnished with carpeting in bedroom and allergen mattress cover.  Pets inside the house include 1 cat. There is no history of cockroach or mice infestation. There are no smokers in the house.  The house  does not have a damp basement.            Review of Systems   Constitutional: Negative for activity change, fatigue and fever.   HENT: Negative for congestion, dental problem, ear pain, facial swelling, nosebleeds, postnasal drip, rhinorrhea, sinus pressure and sneezing.    Eyes: Negative for discharge, redness and itching.   Respiratory: Negative for cough, chest tightness, shortness of breath and wheezing.    Cardiovascular: Negative for chest pain.   Gastrointestinal: Negative for diarrhea, nausea and vomiting.   Musculoskeletal: Negative for arthralgias, joint swelling and myalgias.   Skin: Negative for rash.   Neurological: Negative for headaches.   Hematological: Negative for adenopathy.   Psychiatric/Behavioral: Negative for behavioral problems and self-injury.           Current Outpatient Medications:      azelastine (ASTELIN) 0.1 % nasal spray, Spray 2 sprays into both nostrils 2 times daily as needed for rhinitis, Disp: 30 mL, Rfl: 3     Dupilumab (DUPIXENT) 300 MG/2ML syringe, Inject 2 mLs (300 mg) Subcutaneous every 14 days, Disp: 2 Syringe, Rfl: 11     fluticasone-vilanterol (BREO ELLIPTA) 200-25 MCG/INH inhaler, Inhale 1 puff into the lungs daily, Disp: 1 Inhaler, Rfl: 6     montelukast (SINGULAIR) 10 MG tablet, Take 1 tablet (10 mg) by mouth At Bedtime, Disp: 90 tablet, Rfl: 3     Multiple Vitamin (MULTIVITAMIN) per tablet, Take 1 tablet by mouth daily., Disp: 100 tablet, Rfl: 12     albuterol (PROAIR HFA/PROVENTIL HFA/VENTOLIN HFA) 108 (90 Base) MCG/ACT inhaler, Inhale 2 puffs into the lungs every 4 hours as needed for shortness of breath / dyspnea (Patient not taking: Reported on 1/10/2020), Disp: 1 Inhaler, Rfl: 11     budesonide (PULMICORT) 0.5 MG/2ML neb solution, Place 0.5 mg/2 mL budesonide vial in 8 oz normal saline sinus rinse bottle.  Irrigate each nostril with one half of the bottle twice daily. (Patient not taking: Reported on 1/10/2020), Disp: 360 mL, Rfl: 3     sildenafil (REVATIO) 20 MG  tablet, Take 1 tablet (20 mg) by mouth three times daily for pulmonary hypertension. (Patient not taking: Reported on 1/10/2020), Disp: 30 tablet, Rfl: 11     valACYclovir (VALTREX) 500 MG tablet, Take 2 tablets (1,000 mg) by mouth 2 times daily for 10 days, Disp: 40 tablet, Rfl: 5  Immunization History   Administered Date(s) Administered     HepB-Adult 04/02/2019     Influenza Vaccine IM > 6 months Valent IIV4 10/25/2013, 11/02/2017, 10/26/2018     Pneumococcal 23 valent 10/25/2013     TDAP Vaccine (Adacel) 10/11/2010     Allergies   Allergen Reactions     Advil [Ibuprofen Micronized] Other (See Comments)     Tight chest     Asa [Aspirin] Nausea and Vomiting     Asthma - tight chest     Hazelnuts [Nuts] Other (See Comments)     Chest tight. Positive SPT. Tolerates other tree nuts and peanut.     Naprosyn [Naproxen] GI Disturbance     Stomach      OBJECTIVE:                                                                 /77 (BP Location: Left arm, Patient Position: Sitting, Cuff Size: Adult Regular)   Pulse 68   Temp 97  F (36.1  C) (Tympanic)   Wt 84.4 kg (186 lb 1.1 oz)   SpO2 96%   BMI 28.29 kg/m          Physical Exam  Vitals signs and nursing note reviewed.   Constitutional:       General: He is not in acute distress.     Appearance: He is not diaphoretic.   HENT:      Head: Normocephalic and atraumatic.      Right Ear: Tympanic membrane, ear canal and external ear normal.      Left Ear: Tympanic membrane, ear canal and external ear normal.      Nose: No mucosal edema or rhinorrhea.      Right Turbinates: Not enlarged, swollen or pale.      Left Turbinates: Not enlarged, swollen or pale.      Comments: Dry mucus in both nostrils.      Mouth/Throat:      Lips: Pink.      Mouth: Mucous membranes are moist.      Pharynx: Oropharynx is clear. No pharyngeal swelling, oropharyngeal exudate or posterior oropharyngeal erythema.   Eyes:      General:         Right eye: No discharge.         Left eye: No  discharge.      Conjunctiva/sclera: Conjunctivae normal.   Cardiovascular:      Heart sounds: Normal heart sounds.   Pulmonary:      Effort: Pulmonary effort is normal. No respiratory distress.      Breath sounds: Normal breath sounds and air entry. No stridor, decreased air movement or transmitted upper airway sounds. No decreased breath sounds, wheezing, rhonchi or rales.   Musculoskeletal: Normal range of motion.   Lymphadenopathy:      Cervical: No cervical adenopathy.   Skin:     General: Skin is warm.      Capillary Refill: Capillary refill takes less than 2 seconds.   Neurological:      Mental Status: He is alert.   Psychiatric:         Mood and Affect: Mood normal.         Behavior: Behavior normal.           ASSESSMENT/PLAN:     1. Nasal polyposis 2. Samter's triad     Patient recently started dupilumab.  We will monitor the efficacy in 3 months.  -Continue 300 mg subcutaneously every 2 weeks.  He may use azelastine 2 sprays in each nostril twice daily as needed.  He will continue with saline rinses and desonide.  -Continue montelukast 10 mg by mouth once daily.  It is an essential drug for his AERD.  Depending on symptom control, he still may require aspirin desensitization in the future.      3. Severe persistent asthma without complication  Currently asymptomatic. He defers the office spirometry today.  -Continue Breo Ellipta 200/25 mcg 1 puff once daily and montelukast 10 mg by mouth once daily.  -Continue using albuterol 2 to 4 puffs every 4 hours as needed for persistent cough/chest tightness/wheezing/shortness of breath.  I expect dupilumab to control his asthma symptoms as well.  He will get off spirometry at the next office visit, in 3 months.  If he has a stable FEV1, we could attempt a step-down therapy.    Return in about 3 months (around 4/10/2020), or if symptoms worsen or fail to improve.    Thank you for allowing us to participate in the care of this patient. Please feel free to contact us  if there are any questions or concerns about the patient.    Disclaimer: This note consists of symbols derived from keyboarding, dictation and/or voice recognition software. As a result, there may be errors in the script that have gone undetected. Please consider this when interpreting information found in this chart.    Sudarshan Pierson MD, FAAAAI, FACAAI  Allergy, Asthma and Immunology  [place], [place] and [place]

## 2020-01-10 NOTE — TELEPHONE ENCOUNTER
Reason for Call:  Other FYI    Detailed comments: Ling calling from [employer] stating [employer] was needing approval letter faxed to them. Approval was fax. No further questions.     Phone Number Patient can be reached at: Other phone number:  [phone] - ling*    Best Time: any     Can we leave a detailed message on this number? Not Applicable    Call taken on 1/10/2020 at 2:56 PM by Selene Burkett

## 2020-01-10 NOTE — LETTER
"    1/10/2020         RE: Marco Stovall  [id number] [facility] [address]        Dear Colleague,    Thank you for referring your patient, Marco Stovall, to the [facility]. Please see a copy of my visit note below.    SUBJECTIVE:                                                               Marco Stovall presents today to our Allergy Clinic at [facility] for a follow up visit.    He is a 54-year-old male with chronic rhinosinusitis with nasal polyposis, asthma, and aspirin sensitivity, consistent with aspirin exacerbated respiratory disease.  I saw the patient last time in 2018.   Nasal polyposis was started when he was 26 years old.  He was getting polypectomy every 4 to 5 years.  In his 40s, he started having asthma symptoms.  In his 30s, he took Excedrin for pain, within 30 to 60 minutes, he developed chest tightness, rhinoconjunctivitis symptoms, and vomiting.  He had several episodes like this.  He tolerates acetaminophen without issues.     Percutaneous skin puncture testing for aeroallergens performed on October 26, 2018 showed sensitivity to dust mite (Dermatophagoides pteronyssinus), grass pollen (Azeem grass and grass mix #7) tree pollen (Hackberry and Oak), weed pollen( ragweed mix, Russian thistle), and Alternaria mold.     In 2018, normal CBC with differential without hypereosinophilia noted.  Normal total serum IgE.     In 2018, his FEV1 was in high 50s-low 60s.  The patient was recommended to use Breo Ellipta 200/25 mcg 1 puff once daily and montelukast 10 mg by mouth once daily.    He received his first 2 doses of dupilumab 300 mg every 2 weeks. States he felt his body was \"sore\" for a day after the injection, and then he was fine.   He has been using Breo Ellipta 200/25 mcg 1 puff once daily and montelukast consistently.   The patient denies current chest tightness, cough, wheeze, or shortness of breath.   Asthma triggers remain unchanged. He uses a " spacer/chamber device, where applicable.  He continues using nasal rinses with budesonide daily, prescribed by Dr. Swartz. He also uses azelastine as needed.   The patient denies clear rhinorrhea, nasal itch, stuffiness, sneezing, or interval sinusitis symptoms of fever, facial pain, or purulent rhinorrhea.  At this point, he wants to see how effective is Dupixent without doing ASA desensitization. He had several additional questions about the possible side effects of dupilumab, which I answered.       Patient Active Problem List   Diagnosis     Screening for hypertension     Chronic maxillary sinusitis     Hyperlipidemia LDL goal <130     Severe persistent asthma without complication     Nasal polyp     Genital herpes simplex, unspecified site     Samter's triad     Aspirin sensitivity     Seasonal allergic rhinitis due to pollen     Allergic rhinitis due to dust mite     Chronic pansinusitis     Nasal polyposis     History of endoscopic sinus surgery       Past Medical History:   Diagnosis Date     Mild intermittent asthma without complication 3/24/2016     Polyp of nasal cavity       Problem (# of Occurrences) Relation (Name,Age of Onset)    Diabetes (1) Maternal Grandfather    Hypertension (2) Mother, Father    Thyroid Disease (2) Mother: hypothyroidism, Daughter: hypothyroidism       Negative family history of: Asthma, C.A.D., Cerebrovascular Disease, Breast Cancer, Cancer - colorectal, Prostate Cancer        Past Surgical History:   Procedure Laterality Date     ENT SURGERY       ETHMOIDECTOMY  6/16/2014    Procedure: ETHMOIDECTOMY;  Surgeon: Jimbo Yang MD;  Location: [facility] OR     [employer] SYSTEM ENDOSCOPIC SINUS SURGERY Bilateral 12/3/2019    Procedure: Bilateral endoscopic maxillary antrostomies with tissue removal, bilateral endoscopic total ethmoidectomies, bilateral endoscopic sphenoidotomies, bilateral endoscopic frontal sinusotomies, bilateral endoscopic nasal polypectomy, image  guidance;  Surgeon: Josh Swartz MD;  Location: [facility] OR     SURGICAL HISTORY OF -   2010    Nasal Polyp; 3 total surgeries: 1997     Social History     Socioeconomic History     Marital status:      Spouse name: None     Number of children: None     Years of education: None     Highest education level: None   Occupational History     None   Social Needs     Financial resource strain: None     Food insecurity:     Worry: None     Inability: None     Transportation needs:     Medical: None     Non-medical: None   Tobacco Use     Smoking status: Never Smoker     Smokeless tobacco: Never Used   Substance and Sexual Activity     Alcohol use: Yes     Comment: wine or mixed 2 a month or more.     Drug use: No     Sexual activity: Yes     Partners: Female   Lifestyle     Physical activity:     Days per week: None     Minutes per session: None     Stress: None   Relationships     Social connections:     Talks on phone: None     Gets together: None     Attends Congregation service: None     Active member of club or organization: None     Attends meetings of clubs or organizations: None     Relationship status: None     Intimate partner violence:     Fear of current or ex partner: None     Emotionally abused: None     Physically abused: None     Forced sexual activity: None   Other Topics Concern     Parent/sibling w/ CABG, MI or angioplasty before 65F 55M? No      Service No     Blood Transfusions No     Caffeine Concern Yes     Comment: 2 cups a day     Occupational Exposure No     Hobby Hazards Yes     Comment: motorcycle     Sleep Concern No     Stress Concern No     Weight Concern No     Special Diet Yes     Comment: multi vit     Back Care No     Exercise No     Bike Helmet Yes     Seat Belt Yes     Self-Exams Not Asked   Social History Narrative    January 10, 2020    ENVIRONMENTAL HISTORY: [place] The home is heated with a forced air. They do have central  air conditioning. The patient's bedroom is furnished with carpeting in bedroom and allergen mattress cover.  Pets inside the house include 1 cat. There is no history of cockroach or mice infestation. There are no smokers in the house.  The house does not have a damp basement.            Review of Systems   Constitutional: Negative for activity change, fatigue and fever.   HENT: Negative for congestion, dental problem, ear pain, facial swelling, nosebleeds, postnasal drip, rhinorrhea, sinus pressure and sneezing.    Eyes: Negative for discharge, redness and itching.   Respiratory: Negative for cough, chest tightness, shortness of breath and wheezing.    Cardiovascular: Negative for chest pain.   Gastrointestinal: Negative for diarrhea, nausea and vomiting.   Musculoskeletal: Negative for arthralgias, joint swelling and myalgias.   Skin: Negative for rash.   Neurological: Negative for headaches.   Hematological: Negative for adenopathy.   Psychiatric/Behavioral: Negative for behavioral problems and self-injury.           Current Outpatient Medications:      azelastine (ASTELIN) 0.1 % nasal spray, Spray 2 sprays into both nostrils 2 times daily as needed for rhinitis, Disp: 30 mL, Rfl: 3     Dupilumab (DUPIXENT) 300 MG/2ML syringe, Inject 2 mLs (300 mg) Subcutaneous every 14 days, Disp: 2 Syringe, Rfl: 11     fluticasone-vilanterol (BREO ELLIPTA) 200-25 MCG/INH inhaler, Inhale 1 puff into the lungs daily, Disp: 1 Inhaler, Rfl: 6     montelukast (SINGULAIR) 10 MG tablet, Take 1 tablet (10 mg) by mouth At Bedtime, Disp: 90 tablet, Rfl: 3     Multiple Vitamin (MULTIVITAMIN) per tablet, Take 1 tablet by mouth daily., Disp: 100 tablet, Rfl: 12     albuterol (PROAIR HFA/PROVENTIL HFA/VENTOLIN HFA) 108 (90 Base) MCG/ACT inhaler, Inhale 2 puffs into the lungs every 4 hours as needed for shortness of breath / dyspnea (Patient not taking: Reported on 1/10/2020), Disp: 1 Inhaler, Rfl: 11     budesonide (PULMICORT) 0.5 MG/2ML neb  solution, Place 0.5 mg/2 mL budesonide vial in 8 oz normal saline sinus rinse bottle.  Irrigate each nostril with one half of the bottle twice daily. (Patient not taking: Reported on 1/10/2020), Disp: 360 mL, Rfl: 3     sildenafil (REVATIO) 20 MG tablet, Take 1 tablet (20 mg) by mouth three times daily for pulmonary hypertension. (Patient not taking: Reported on 1/10/2020), Disp: 30 tablet, Rfl: 11     valACYclovir (VALTREX) 500 MG tablet, Take 2 tablets (1,000 mg) by mouth 2 times daily for 10 days, Disp: 40 tablet, Rfl: 5  Immunization History   Administered Date(s) Administered     HepB-Adult 04/02/2019     Influenza Vaccine IM > 6 months Valent IIV4 10/25/2013, 11/02/2017, 10/26/2018     Pneumococcal 23 valent 10/25/2013     TDAP Vaccine (Adacel) 10/11/2010     Allergies   Allergen Reactions     Advil [Ibuprofen Micronized] Other (See Comments)     Tight chest     Asa [Aspirin] Nausea and Vomiting     Asthma - tight chest     Hazelnuts [Nuts] Other (See Comments)     Chest tight. Positive SPT. Tolerates other tree nuts and peanut.     Naprosyn [Naproxen] GI Disturbance     Stomach      OBJECTIVE:                                                                 /77 (BP Location: Left arm, Patient Position: Sitting, Cuff Size: Adult Regular)   Pulse 68   Temp 97  F (36.1  C) (Tympanic)   Wt 84.4 kg (186 lb 1.1 oz)   SpO2 96%   BMI 28.29 kg/m           Physical Exam  Vitals signs and nursing note reviewed.   Constitutional:       General: He is not in acute distress.     Appearance: He is not diaphoretic.   HENT:      Head: Normocephalic and atraumatic.      Right Ear: Tympanic membrane, ear canal and external ear normal.      Left Ear: Tympanic membrane, ear canal and external ear normal.      Nose: No mucosal edema or rhinorrhea.      Right Turbinates: Not enlarged, swollen or pale.      Left Turbinates: Not enlarged, swollen or pale.      Comments: Dry mucus in both nostrils.      Mouth/Throat:       Lips: Pink.      Mouth: Mucous membranes are moist.      Pharynx: Oropharynx is clear. No pharyngeal swelling, oropharyngeal exudate or posterior oropharyngeal erythema.   Eyes:      General:         Right eye: No discharge.         Left eye: No discharge.      Conjunctiva/sclera: Conjunctivae normal.   Cardiovascular:      Heart sounds: Normal heart sounds.   Pulmonary:      Effort: Pulmonary effort is normal. No respiratory distress.      Breath sounds: Normal breath sounds and air entry. No stridor, decreased air movement or transmitted upper airway sounds. No decreased breath sounds, wheezing, rhonchi or rales.   Musculoskeletal: Normal range of motion.   Lymphadenopathy:      Cervical: No cervical adenopathy.   Skin:     General: Skin is warm.      Capillary Refill: Capillary refill takes less than 2 seconds.   Neurological:      Mental Status: He is alert.   Psychiatric:         Mood and Affect: Mood normal.         Behavior: Behavior normal.           ASSESSMENT/PLAN:     1. Nasal polyposis 2. Samter's triad     Patient recently started dupilumab.  We will monitor the efficacy in 3 months.  -Continue 300 mg subcutaneously every 2 weeks.  He may use azelastine 2 sprays in each nostril twice daily as needed.  He will continue with saline rinses and desonide.  -Continue montelukast 10 mg by mouth once daily.  It is an essential drug for his AERD.  Depending on symptom control, he still may require aspirin desensitization in the future.      3. Severe persistent asthma without complication  Currently asymptomatic. He defers the office spirometry today.  -Continue Breo Ellipta 200/25 mcg 1 puff once daily and montelukast 10 mg by mouth once daily.  -Continue using albuterol 2 to 4 puffs every 4 hours as needed for persistent cough/chest tightness/wheezing/shortness of breath.  I expect dupilumab to control his asthma symptoms as well.  He will get off spirometry at the next office visit, in 3 months.  If he has a  stable FEV1, we could attempt a step-down therapy.    Return in about 3 months (around 4/10/2020), or if symptoms worsen or fail to improve.    Thank you for allowing us to participate in the care of this patient. Please feel free to contact us if there are any questions or concerns about the patient.    Disclaimer: This note consists of symbols derived from keyboarding, dictation and/or voice recognition software. As a result, there may be errors in the script that have gone undetected. Please consider this when interpreting information found in this chart.    Sudarshan Pierson MD, FAAAAI, FACAAI  Allergy, Asthma and Immunology  [place], [place] and [place]      Again, thank you for allowing me to participate in the care of your patient.        Sincerely,        Sudarshan Pierson MD

## 2020-02-03 ENCOUNTER — MYC REFILL (OUTPATIENT)
Dept: FAMILY MEDICINE | Facility: CLINIC | Age: 55
End: 2020-02-03

## 2020-02-03 DIAGNOSIS — N52.9 ERECTILE DYSFUNCTION, UNSPECIFIED ERECTILE DYSFUNCTION TYPE: ICD-10-CM

## 2020-02-03 NOTE — PROGRESS NOTES
Chief Complaint   Patient presents with     Post-op Visit     Post op sinus surgery 12-3-20     History of Present Illness  Marco Stovall is a 54 year old male who presents today for follow-up.  The patient went to the operating room on 12/03/2019 for bilateral endoscopic sinus surgery.    The patient was seen for follow-up and debridement on 12/17/2019.  The patient followed up with allergy.  The decision was made to defer aspirin desensitization and to place the patient on dupilumab.      Since last seeing the patient, the patient reports excellent control of his symptoms.  He starting to get his sense of smell back.  Patient denies any significant nasal obstruction, nasal congestion, rhinorrhea, postnasal drainage, taste/smell disturbance, face pain/pressure/fullness.  The patient is doing well and has no other ENT related concerns.    Past Medical History  Patient Active Problem List   Diagnosis     Screening for hypertension     Chronic maxillary sinusitis     Hyperlipidemia LDL goal <130     Severe persistent asthma without complication     Nasal polyp     Genital herpes simplex, unspecified site     Samter's triad     Aspirin sensitivity     Seasonal allergic rhinitis due to pollen     Allergic rhinitis due to dust mite     Chronic pansinusitis     Nasal polyposis     History of endoscopic sinus surgery     Current Medications    Current Outpatient Medications:      Dupilumab (DUPIXENT) 300 MG/2ML syringe, Inject 2 mLs (300 mg) Subcutaneous every 14 days, Disp: 2 Syringe, Rfl: 11     montelukast (SINGULAIR) 10 MG tablet, Take 1 tablet (10 mg) by mouth At Bedtime, Disp: 90 tablet, Rfl: 3     Multiple Vitamin (MULTIVITAMIN) per tablet, Take 1 tablet by mouth daily., Disp: 100 tablet, Rfl: 12     sildenafil (REVATIO) 20 MG tablet, Take 1 tablet (20 mg) by mouth three times daily for pulmonary hypertension., Disp: 30 tablet, Rfl: 11     albuterol (PROAIR HFA/PROVENTIL HFA/VENTOLIN HFA) 108 (90 Base) MCG/ACT  inhaler, Inhale 2 puffs into the lungs every 4 hours as needed for shortness of breath / dyspnea (Patient not taking: Reported on 2/5/2020), Disp: 1 Inhaler, Rfl: 11     azelastine (ASTELIN) 0.1 % nasal spray, Spray 2 sprays into both nostrils 2 times daily as needed for rhinitis (Patient not taking: Reported on 2/5/2020), Disp: 30 mL, Rfl: 3     budesonide (PULMICORT) 0.5 MG/2ML neb solution, Place 0.5 mg/2 mL budesonide vial in 8 oz normal saline sinus rinse bottle.  Irrigate each nostril with one half of the bottle twice daily. (Patient not taking: Reported on 2/5/2020), Disp: 360 mL, Rfl: 3     fluticasone-vilanterol (BREO ELLIPTA) 200-25 MCG/INH inhaler, Inhale 1 puff into the lungs daily (Patient not taking: Reported on 2/5/2020), Disp: 1 Inhaler, Rfl: 6     valACYclovir (VALTREX) 500 MG tablet, Take 2 tablets (1,000 mg) by mouth 2 times daily for 10 days (Patient not taking: Reported on 2/5/2020), Disp: 40 tablet, Rfl: 5    Allergies  Allergies   Allergen Reactions     Advil [Ibuprofen Micronized] Other (See Comments)     Tight chest     Asa [Aspirin] Nausea and Vomiting     Asthma - tight chest     Hazelnuts [Nuts] Other (See Comments)     Chest tight. Positive SPT. Tolerates other tree nuts and peanut.     Naprosyn [Naproxen] GI Disturbance     Stomach        Social History  Social History     Socioeconomic History     Marital status:      Spouse name: Not on file     Number of children: Not on file     Years of education: Not on file     Highest education level: Not on file   Occupational History     Not on file   Social Needs     Financial resource strain: Not on file     Food insecurity:     Worry: Not on file     Inability: Not on file     Transportation needs:     Medical: Not on file     Non-medical: Not on file   Tobacco Use     Smoking status: Never Smoker     Smokeless tobacco: Never Used   Substance and Sexual Activity     Alcohol use: Yes     Comment: wine or mixed 2 a month or more.      Drug use: No     Sexual activity: Yes     Partners: Female   Lifestyle     Physical activity:     Days per week: Not on file     Minutes per session: Not on file     Stress: Not on file   Relationships     Social connections:     Talks on phone: Not on file     Gets together: Not on file     Attends Church service: Not on file     Active member of club or organization: Not on file     Attends meetings of clubs or organizations: Not on file     Relationship status: Not on file     Intimate partner violence:     Fear of current or ex partner: Not on file     Emotionally abused: Not on file     Physically abused: Not on file     Forced sexual activity: Not on file   Other Topics Concern     Parent/sibling w/ CABG, MI or angioplasty before 65F 55M? No      Service No     Blood Transfusions No     Caffeine Concern Yes     Comment: 2 cups a day     Occupational Exposure No     Hobby Hazards Yes     Comment: motorcycle     Sleep Concern No     Stress Concern No     Weight Concern No     Special Diet Yes     Comment: multi vit     Back Care No     Exercise No     Bike Helmet Yes     Seat Belt Yes     Self-Exams Not Asked   Social History Narrative    January 10, 2020    ENVIRONMENTAL HISTORY: [place] The home is heated with a forced air. They do have central air conditioning. The patient's bedroom is furnished with carpeting in bedroom and allergen mattress cover.  Pets inside the house include 1 cat. There is no history of cockroach or mice infestation. There are no smokers in the house.  The house does not have a damp basement.        Family History  Family History   Problem Relation Age of Onset     Hypertension Mother      Thyroid Disease Mother         hypothyroidism     Hypertension Father      Diabetes Maternal Grandfather      Thyroid Disease Daughter         hypothyroidism     Asthma No family hx of      C.A.D. No family hx of      Cerebrovascular  "Disease No family hx of      Breast Cancer No family hx of      Cancer - colorectal No family hx of      Prostate Cancer No family hx of        Review of Systems  As per HPI and PMHx, otherwise 10 system review including the head and neck, constitutional, eyes, respiratory, GI, skin, neurologic, lymphatic, endocrine, and allergy systems is negative.    Physical Exam  /82 (BP Location: Right arm, Patient Position: Sitting, Cuff Size: Adult Regular)   Pulse 64   Temp 97.5  F (36.4  C) (Oral)   Ht 1.727 m (5' 8\")   Wt 84.4 kg (186 lb)   BMI 28.28 kg/m    GENERAL: Patient is a pleasant, cooperative 54 year old male in no acute distress.  HEAD: Normocephalic, atraumatic.  Hair and scalp are normal.  EYES: Pupils are equal, round, reactive to light and accommodation.  Extraocular movements are intact.  The sclera nonicteric without injection.  The extraocular structures are normal.  EARS: Normal shape and symmetry.  No tenderness when palpating the mastoid or tragal areas bilaterally.  NOSE: Nares are patent.  Nasal mucosa is pink and moist.  Negative anterior rhinoscopy.  NEUROLOGIC: Cranial nerves II through XII are grossly intact.  Voice is strong.  Patient is House-Brackman I/VI bilaterally.  CARDIOVASCULAR: Extremities are warm and well-perfused.  No significant peripheral edema.  RESPIRATORY: Patient has nonlabored breathing without cough, wheeze, stridor.  PSYCHIATRIC: Patient is alert and oriented.  Mood and affect appear normal.  SKIN: Warm and dry.  No scalp, face, or neck lesions noted.    Procedure: Rigid nasal Endoscopy  Indication: Postoperative endoscopic sinus surgery    To best visualize the sinonasal anatomy and due to the chief complaint and HPI, I proceeded with flexible fiberoptic nasal endoscopy.  The bilateral nasal cavities were not anesthetized and decongestede.  The bilateral nasal cavities were then examined using a rigid 30 degree nasal endoscope.  The right nasal cavity widely " patent maxillary antrostomy and ethmoid cavity.  The sphenoethmoid recess was clear.  Minimal polypoid tissue.  Frontal sinus outflow tract was clear.  No evidence of adhesions.  The left nasal cavities widely patent maxillary antrostomy and ethmoid cavity.  The sphenoethmoid recess was clear.  Minimal polypoid tissue.  The frontal sinus outflow tract was clear.  No evidence of nasal adhesions.  The sinonasal mucosa appeared normal.  The nasopharynx had a normal appearance with normal Eustachian tube openings and fossa of Rosenmuller bilaterally.  Minimal adenoid tissue.  The scope was removed.  The patient tolerated the procedure well.    Assessment and Plan     ICD-10-CM    1. Chronic pansinusitis J32.4    2. Nasal polyposis J33.9    3. Samter's triad J45.909     J33.9     Z88.6    4. Severe persistent asthma without complication J45.50    5. Status post functional endoscopic sinus surgery Z98.890       It was my pleasure seeing Marco Stovall today in clinic.  He is doing well after his endoscopic sinus surgery.  He is healed well.  He is doing well on the Dupixent.  We will have him continue his budesonide irrigations at least once daily several days a week.  The patient can return to clinic as needed with problems or concerns.    Josh Swartz MD  Department of Otolarygology-Head and Neck Surgery  [facility]

## 2020-02-05 ENCOUNTER — OFFICE VISIT (OUTPATIENT)
Dept: OTOLARYNGOLOGY | Facility: CLINIC | Age: 55
End: 2020-02-05
Payer: COMMERCIAL

## 2020-02-05 VITALS
BODY MASS INDEX: 28.19 KG/M2 | TEMPERATURE: 97.5 F | DIASTOLIC BLOOD PRESSURE: 82 MMHG | HEIGHT: 68 IN | WEIGHT: 186 LBS | HEART RATE: 64 BPM | SYSTOLIC BLOOD PRESSURE: 133 MMHG

## 2020-02-05 DIAGNOSIS — Z98.890 STATUS POST FUNCTIONAL ENDOSCOPIC SINUS SURGERY: ICD-10-CM

## 2020-02-05 DIAGNOSIS — J45.909 SAMTER'S TRIAD: ICD-10-CM

## 2020-02-05 DIAGNOSIS — J33.9 SAMTER'S TRIAD: ICD-10-CM

## 2020-02-05 DIAGNOSIS — J32.4 CHRONIC PANSINUSITIS: Primary | ICD-10-CM

## 2020-02-05 DIAGNOSIS — J45.50 SEVERE PERSISTENT ASTHMA WITHOUT COMPLICATION (H): ICD-10-CM

## 2020-02-05 DIAGNOSIS — Z88.6 SAMTER'S TRIAD: ICD-10-CM

## 2020-02-05 DIAGNOSIS — J33.9 NASAL POLYPOSIS: ICD-10-CM

## 2020-02-05 PROCEDURE — 99213 OFFICE O/P EST LOW 20 MIN: CPT | Mod: 25 | Performed by: OTOLARYNGOLOGY

## 2020-02-05 PROCEDURE — 31231 NASAL ENDOSCOPY DX: CPT | Performed by: OTOLARYNGOLOGY

## 2020-02-05 ASSESSMENT — MIFFLIN-ST. JEOR: SCORE: 1663.19

## 2020-02-05 NOTE — PATIENT INSTRUCTIONS
Per physician instructions      If you have questions or concerns on any instructions given to you by your provider today or if you need to schedule an appointment, you can reach us at [phone].

## 2020-02-05 NOTE — PROGRESS NOTES
This laryngoscopy scope was used on this patient.    Rigid    Nathen Storz Rigid #[id number] Pediatric/Adult/Post-op sinus  Vidhya Beltre CMA

## 2020-02-05 NOTE — NURSING NOTE
"Initial /82 (BP Location: Right arm, Patient Position: Sitting, Cuff Size: Adult Regular)   Pulse 64   Temp 97.5  F (36.4  C) (Oral)   Ht 1.727 m (5' 8\")   Wt 84.4 kg (186 lb)   BMI 28.28 kg/m   Estimated body mass index is 28.28 kg/m  as calculated from the following:    Height as of this encounter: 1.727 m (5' 8\").    Weight as of this encounter: 84.4 kg (186 lb). .    Vidhya Beltre CMA    "

## 2020-02-05 NOTE — LETTER
2/5/2020         RE: Marco Stovall  [id number] [facility] [address]        Dear Colleague,    Thank you for referring your patient, Marco Stovall, to the [facility]. Please see a copy of my visit note below.    Chief Complaint   Patient presents with     Post-op Visit     Post op sinus surgery 12-3-20     History of Present Illness  Marco Stovall is a 54 year old male who presents today for follow-up.  The patient went to the operating room on 12/03/2019 for bilateral endoscopic sinus surgery.     The patient was seen for follow-up and debridement on 12/17/2019.  The patient followed up with allergy.  The decision was made to defer aspirin desensitization and to place the patient on dupilumab.      Since last seeing the patient, the patient reports excellent control of his symptoms.  He starting to get his sense of smell back.  Patient denies any significant nasal obstruction, nasal congestion, rhinorrhea, postnasal drainage, taste/smell disturbance, face pain/pressure/fullness.  The patient is doing well and has no other ENT related concerns.    Past Medical History  Patient Active Problem List   Diagnosis     Screening for hypertension     Chronic maxillary sinusitis     Hyperlipidemia LDL goal <130     Severe persistent asthma without complication     Nasal polyp     Genital herpes simplex, unspecified site     Samter's triad     Aspirin sensitivity     Seasonal allergic rhinitis due to pollen     Allergic rhinitis due to dust mite     Chronic pansinusitis     Nasal polyposis     History of endoscopic sinus surgery     Current Medications    Current Outpatient Medications:      Dupilumab (DUPIXENT) 300 MG/2ML syringe, Inject 2 mLs (300 mg) Subcutaneous every 14 days, Disp: 2 Syringe, Rfl: 11     montelukast (SINGULAIR) 10 MG tablet, Take 1 tablet (10 mg) by mouth At Bedtime, Disp: 90 tablet, Rfl: 3     Multiple Vitamin (MULTIVITAMIN) per tablet, Take 1 tablet by mouth daily.,  Disp: 100 tablet, Rfl: 12     sildenafil (REVATIO) 20 MG tablet, Take 1 tablet (20 mg) by mouth three times daily for pulmonary hypertension., Disp: 30 tablet, Rfl: 11     albuterol (PROAIR HFA/PROVENTIL HFA/VENTOLIN HFA) 108 (90 Base) MCG/ACT inhaler, Inhale 2 puffs into the lungs every 4 hours as needed for shortness of breath / dyspnea (Patient not taking: Reported on 2/5/2020), Disp: 1 Inhaler, Rfl: 11     azelastine (ASTELIN) 0.1 % nasal spray, Spray 2 sprays into both nostrils 2 times daily as needed for rhinitis (Patient not taking: Reported on 2/5/2020), Disp: 30 mL, Rfl: 3     budesonide (PULMICORT) 0.5 MG/2ML neb solution, Place 0.5 mg/2 mL budesonide vial in 8 oz normal saline sinus rinse bottle.  Irrigate each nostril with one half of the bottle twice daily. (Patient not taking: Reported on 2/5/2020), Disp: 360 mL, Rfl: 3     fluticasone-vilanterol (BREO ELLIPTA) 200-25 MCG/INH inhaler, Inhale 1 puff into the lungs daily (Patient not taking: Reported on 2/5/2020), Disp: 1 Inhaler, Rfl: 6     valACYclovir (VALTREX) 500 MG tablet, Take 2 tablets (1,000 mg) by mouth 2 times daily for 10 days (Patient not taking: Reported on 2/5/2020), Disp: 40 tablet, Rfl: 5    Allergies  Allergies   Allergen Reactions     Advil [Ibuprofen Micronized] Other (See Comments)     Tight chest     Asa [Aspirin] Nausea and Vomiting     Asthma - tight chest     Hazelnuts [Nuts] Other (See Comments)     Chest tight. Positive SPT. Tolerates other tree nuts and peanut.     Naprosyn [Naproxen] GI Disturbance     Stomach        Social History  Social History     Socioeconomic History     Marital status:      Spouse name: Not on file     Number of children: Not on file     Years of education: Not on file     Highest education level: Not on file   Occupational History     Not on file   Social Needs     Financial resource strain: Not on file     Food insecurity:     Worry: Not on file     Inability: Not on file     Transportation  needs:     Medical: Not on file     Non-medical: Not on file   Tobacco Use     Smoking status: Never Smoker     Smokeless tobacco: Never Used   Substance and Sexual Activity     Alcohol use: Yes     Comment: wine or mixed 2 a month or more.     Drug use: No     Sexual activity: Yes     Partners: Female   Lifestyle     Physical activity:     Days per week: Not on file     Minutes per session: Not on file     Stress: Not on file   Relationships     Social connections:     Talks on phone: Not on file     Gets together: Not on file     Attends Restorationism service: Not on file     Active member of club or organization: Not on file     Attends meetings of clubs or organizations: Not on file     Relationship status: Not on file     Intimate partner violence:     Fear of current or ex partner: Not on file     Emotionally abused: Not on file     Physically abused: Not on file     Forced sexual activity: Not on file   Other Topics Concern     Parent/sibling w/ CABG, MI or angioplasty before 65F 55M? No      Service No     Blood Transfusions No     Caffeine Concern Yes     Comment: 2 cups a day     Occupational Exposure No     Hobby Hazards Yes     Comment: motorcycle     Sleep Concern No     Stress Concern No     Weight Concern No     Special Diet Yes     Comment: multi vit     Back Care No     Exercise No     Bike Helmet Yes     Seat Belt Yes     Self-Exams Not Asked   Social History Narrative    January 10, 2020    ENVIRONMENTAL HISTORY: [place] The home is heated with a forced air. They do have central air conditioning. The patient's bedroom is furnished with carpeting in bedroom and allergen mattress cover.  Pets inside the house include 1 cat. There is no history of cockroach or mice infestation. There are no smokers in the house.  The house does not have a damp basement.        Family History  Family History   Problem Relation Age of Onset     Hypertension Mother   "    Thyroid Disease Mother         hypothyroidism     Hypertension Father      Diabetes Maternal Grandfather      Thyroid Disease Daughter         hypothyroidism     Asthma No family hx of      C.A.D. No family hx of      Cerebrovascular Disease No family hx of      Breast Cancer No family hx of      Cancer - colorectal No family hx of      Prostate Cancer No family hx of        Review of Systems  As per HPI and PMHx, otherwise 10 system review including the head and neck, constitutional, eyes, respiratory, GI, skin, neurologic, lymphatic, endocrine, and allergy systems is negative.    Physical Exam  /82 (BP Location: Right arm, Patient Position: Sitting, Cuff Size: Adult Regular)   Pulse 64   Temp 97.5  F (36.4  C) (Oral)   Ht 1.727 m (5' 8\")   Wt 84.4 kg (186 lb)   BMI 28.28 kg/m     GENERAL: Patient is a pleasant, cooperative 54 year old male in no acute distress.  HEAD: Normocephalic, atraumatic.  Hair and scalp are normal.  EYES: Pupils are equal, round, reactive to light and accommodation.  Extraocular movements are intact.  The sclera nonicteric without injection.  The extraocular structures are normal.  EARS: Normal shape and symmetry.  No tenderness when palpating the mastoid or tragal areas bilaterally.  NOSE: Nares are patent.  Nasal mucosa is pink and moist.  Negative anterior rhinoscopy.  NEUROLOGIC: Cranial nerves II through XII are grossly intact.  Voice is strong.  Patient is House-Brackman I/VI bilaterally.  CARDIOVASCULAR: Extremities are warm and well-perfused.  No significant peripheral edema.  RESPIRATORY: Patient has nonlabored breathing without cough, wheeze, stridor.  PSYCHIATRIC: Patient is alert and oriented.  Mood and affect appear normal.  SKIN: Warm and dry.  No scalp, face, or neck lesions noted.    Procedure: Rigid nasal Endoscopy  Indication: Postoperative endoscopic sinus surgery    To best visualize the sinonasal anatomy and due to the chief complaint and HPI, I proceeded " with flexible fiberoptic nasal endoscopy.  The bilateral nasal cavities were not anesthetized and decongestede.  The bilateral nasal cavities were then examined using a rigid 30 degree nasal endoscope.  The right nasal cavity widely patent maxillary antrostomy and ethmoid cavity.  The sphenoethmoid recess was clear.  Minimal polypoid tissue.  Frontal sinus outflow tract was clear.  No evidence of adhesions.  The left nasal cavities widely patent maxillary antrostomy and ethmoid cavity.  The sphenoethmoid recess was clear.  Minimal polypoid tissue.  The frontal sinus outflow tract was clear.  No evidence of nasal adhesions.  The sinonasal mucosa appeared normal.  The nasopharynx had a normal appearance with normal Eustachian tube openings and fossa of Rosenmuller bilaterally.  Minimal adenoid tissue.  The scope was removed.  The patient tolerated the procedure well.    Assessment and Plan     ICD-10-CM    1. Chronic pansinusitis J32.4    2. Nasal polyposis J33.9    3. Samter's triad J45.909     J33.9     Z88.6    4. Severe persistent asthma without complication J45.50    5. Status post functional endoscopic sinus surgery Z98.890       It was my pleasure seeing Marco Stovall today in clinic.  He is doing well after his endoscopic sinus surgery.  He is healed well.  He is doing well on the Dupixent.  We will have him continue his budesonide irrigations at least once daily several days a week.  The patient can return to clinic as needed with problems or concerns.    Josh Swartz MD  Department of Otolarygology-Head and Neck Surgery  [facility]       Again, thank you for allowing me to participate in the care of your patient.        Sincerely,        Josh Swartz MD

## 2020-02-06 RX ORDER — SILDENAFIL CITRATE 20 MG/1
TABLET ORAL
Qty: 30 TABLET | Refills: 11 | Status: SHIPPED | OUTPATIENT
Start: 2020-02-06 | End: 2022-09-28

## 2020-02-06 NOTE — TELEPHONE ENCOUNTER
"Requested Prescriptions   Pending Prescriptions Disp Refills     sildenafil (REVATIO) 20 MG tablet 30 tablet 11     Sig: Take 1 tablet (20 mg) by mouth three times daily for pulmonary hypertension.       Erectile Dysfuction Protocol Passed - 2/3/2020  5:46 PM        Passed - Absence of nitrates on medication list        Passed - Absence of Alpha Blockers on Med list        Passed - Recent (12 mo) or future (30 days) visit within the authorizing provider's specialty     Patient has had an office visit with the authorizing provider or a provider within the authorizing providers department within the previous 12 mos or has a future within next 30 days. See \"Patient Info\" tab in inbasket, or \"Choose Columns\" in Meds & Orders section of the refill encounter.              Passed - Medication is active on med list        Passed - Patient is age 18 or older          "

## 2020-10-05 DIAGNOSIS — J32.4 CHRONIC PANSINUSITIS: ICD-10-CM

## 2020-10-05 DIAGNOSIS — J33.9 NOSE POLYP: ICD-10-CM

## 2020-10-05 RX ORDER — DUPILUMAB 300 MG/2ML
300 INJECTION, SOLUTION SUBCUTANEOUS
Qty: 2 SYRINGE | Refills: 11 | Status: SHIPPED | OUTPATIENT
Start: 2020-10-05 | End: 2020-10-09

## 2020-10-05 NOTE — TELEPHONE ENCOUNTER
Routing refill request to provider for review/approval because:  Drug not on the [facility] refill protocol   LOV:  1/10/2020, Return in about 3 months (around 4/10/2020)  Julia Mckeon RN

## 2020-10-06 ENCOUNTER — TELEPHONE (OUTPATIENT)
Dept: ALLERGY | Facility: CLINIC | Age: 55
End: 2020-10-06

## 2020-10-06 NOTE — TELEPHONE ENCOUNTER
Left message on answering machine for patient to call back.    Pt needs follow up appointment.     Brandee MOY RN  Specialty/Allergy Clinics

## 2020-10-06 NOTE — TELEPHONE ENCOUNTER
The refill provided; however, the patient needs a follow-up visit.    Sudarshan Pierson MD     Yes, that would be fine. THanks!

## 2020-10-06 NOTE — TELEPHONE ENCOUNTER
Pt returned call and notified of need for followup appt. Pt will call to reschedule.    Denise Behrendt  Specialty CSS

## 2020-10-06 NOTE — TELEPHONE ENCOUNTER
PA Initiation    Medication: dupixent (pa pending)  Insurance Company: [employer] [place] - Phone [phone] Fax [phone]  Pharmacy Filling the Rx: [facility] - [place], [place] - [facility] [address]  Filling Pharmacy Phone: [phone]  Filling Pharmacy Fax: [phone]  Start Date: 10/6/2020    [facility] Prior Authorization Team   Phone: [phone]  Fax: [phone]

## 2020-10-07 NOTE — TELEPHONE ENCOUNTER
Prior Authorization Approval    Authorization Effective Date: 10/6/2020  Authorization Expiration Date: 10/6/2021  Medication: dupixent (pa approval)  Approved Dose/Quantity: 4  Reference #:     Insurance Company: [employer] [place] - Phone [phone] Fax [phone]  Expected CoPay:       CoPay Card Available:      Foundation Assistance Needed:    Which Pharmacy is filling the prescription (Not needed for infusion/clinic administered): [facility] - [place] [address] [address]   Pharmacy Notified: Yes  Patient Notified: Yes  [facility] Prior Authorization Team   Phone: [phone]  Fax: [phone]

## 2020-10-09 ENCOUNTER — OFFICE VISIT (OUTPATIENT)
Dept: ALLERGY | Facility: CLINIC | Age: 55
End: 2020-10-09
Payer: COMMERCIAL

## 2020-10-09 VITALS
TEMPERATURE: 97 F | HEART RATE: 64 BPM | SYSTOLIC BLOOD PRESSURE: 118 MMHG | BODY MASS INDEX: 29.53 KG/M2 | DIASTOLIC BLOOD PRESSURE: 58 MMHG | WEIGHT: 194.22 LBS | OXYGEN SATURATION: 97 %

## 2020-10-09 DIAGNOSIS — J45.50 SEVERE PERSISTENT ASTHMA WITHOUT COMPLICATION (H): ICD-10-CM

## 2020-10-09 DIAGNOSIS — Z88.6 SAMTER'S TRIAD: ICD-10-CM

## 2020-10-09 DIAGNOSIS — J45.909 SAMTER'S TRIAD: ICD-10-CM

## 2020-10-09 DIAGNOSIS — J32.4 CHRONIC PANSINUSITIS: Primary | ICD-10-CM

## 2020-10-09 DIAGNOSIS — J33.9 SAMTER'S TRIAD: ICD-10-CM

## 2020-10-09 DIAGNOSIS — Z23 NEED FOR PROPHYLACTIC VACCINATION AND INOCULATION AGAINST INFLUENZA: ICD-10-CM

## 2020-10-09 DIAGNOSIS — J33.9 NASAL POLYPOSIS: ICD-10-CM

## 2020-10-09 PROCEDURE — 90682 RIV4 VACC RECOMBINANT DNA IM: CPT | Performed by: ALLERGY & IMMUNOLOGY

## 2020-10-09 PROCEDURE — 90471 IMMUNIZATION ADMIN: CPT | Performed by: ALLERGY & IMMUNOLOGY

## 2020-10-09 PROCEDURE — 99214 OFFICE O/P EST MOD 30 MIN: CPT | Mod: 25 | Performed by: ALLERGY & IMMUNOLOGY

## 2020-10-09 RX ORDER — MONTELUKAST SODIUM 10 MG/1
10 TABLET ORAL AT BEDTIME
Qty: 90 TABLET | Refills: 3 | Status: SHIPPED | OUTPATIENT
Start: 2020-10-09 | End: 2021-09-20

## 2020-10-09 RX ORDER — DUPILUMAB 300 MG/2ML
300 INJECTION, SOLUTION SUBCUTANEOUS
Qty: 2 SYRINGE | Refills: 11 | Status: SHIPPED | OUTPATIENT
Start: 2020-10-09 | End: 2021-09-15

## 2020-10-09 RX ORDER — ALBUTEROL SULFATE 90 UG/1
2 AEROSOL, METERED RESPIRATORY (INHALATION) EVERY 4 HOURS PRN
Qty: 1 INHALER | Refills: 11 | Status: SHIPPED | OUTPATIENT
Start: 2020-10-09 | End: 2021-09-20

## 2020-10-09 ASSESSMENT — ENCOUNTER SYMPTOMS
EYE ITCHING: 0
SHORTNESS OF BREATH: 0
ADENOPATHY: 0
NAUSEA: 0
EYE REDNESS: 0
JOINT SWELLING: 0
FATIGUE: 0
RHINORRHEA: 0
CHEST TIGHTNESS: 0
FACIAL SWELLING: 0
ACTIVITY CHANGE: 0
HEADACHES: 0
MYALGIAS: 0
WHEEZING: 0
VOMITING: 0
COUGH: 0
DIARRHEA: 0
EYE DISCHARGE: 0
SINUS PRESSURE: 0
ARTHRALGIAS: 0
FEVER: 0

## 2020-10-09 NOTE — LETTER
10/9/2020         RE: Marco Stovall  [address] [facility] [address]        Dear Colleague,    Thank you for referring your patient, Marco Stovall, to the [facility]. Please see a copy of my visit note below.    SUBJECTIVE:                                                                   Marco Stovall presents today to our Allergy Clinic at [facility] for a follow up visit.  He is a 55 year old male with nasal polyposis, asthma, and aspirin sensitivity, consistent with aspirin exacerbated respiratory disease.    Nasal polyposis was started when he was 26 years old.  He was getting polypectomy every 4 to 5 years.  In his 40s, he started having asthma symptoms.  In his 30s, he took Excedrin for pain, within 30 to 60 minutes, he developed chest tightness, rhinoconjunctivitis symptoms, and vomiting.  He had several episodes like this.  He tolerates acetaminophen without issues.   Percutaneous skin puncture testing for aeroallergens performed on October 26, 2018 showed sensitivity to dust mite (Dermatophagoides pteronyssinus), grass pollen (Azeem grass and grass mix #7) tree pollen (Hackberry and Oak), weed pollen( ragweed mix, Russian thistle), and Alternaria mold.   In 2018, normal CBC with differential without hypereosinophilia noted.  Normal total serum IgE.     In 2018, his FEV1 was in high 50s-low 60s.  The patient was recommended to use Breo Ellipta 200/25 mcg 1 puff once daily and montelukast 10 mg by mouth once daily.  He started dupilumab in Winter 2020, 300 mg every 2 weeks. States it helps him tremendously.   He stopped Breo and montelukast soon after starting dupilumab.    Marco  is using albuterol HFA  less than twice per week for rescue from chest symptoms. he is waking up less than twice per month due to chest symptoms.  There has been no use of oral steroids since last visit. No ED/PCP/urgent care/other specialist visits for asthma flare  since the previous visit. The patient denies current chest tightness, cough, wheeze, or shortness of breath.   He stopped using nasal rinses with budesonide in Winter.   The patient denies clear rhinorrhea, nasal itch, stuffiness, sneezing, or interval sinusitis symptoms of fever, facial pain, or purulent rhinorrhea.  The sense of smell and taste significantly improved. Overall, Marco is very pleased with dupilumab efficiency.      Patient Active Problem List   Diagnosis     Screening for hypertension     Chronic maxillary sinusitis     Hyperlipidemia LDL goal <130     Severe persistent asthma without complication     Nasal polyp     Genital herpes simplex, unspecified site     Samter's triad     Aspirin sensitivity     Seasonal allergic rhinitis due to pollen     Allergic rhinitis due to dust mite     Chronic pansinusitis     Nasal polyposis     History of endoscopic sinus surgery       Past Medical History:   Diagnosis Date     Mild intermittent asthma without complication 3/24/2016     Polyp of nasal cavity       Problem (# of Occurrences) Relation (Name,Age of Onset)    Diabetes (1) Maternal Grandfather    Hypertension (2) Mother, Father    Thyroid Disease (2) Mother: hypothyroidism, Daughter: hypothyroidism       Negative family history of: Asthma, C.A.D., Cerebrovascular Disease, Breast Cancer, Cancer - colorectal, Prostate Cancer        Past Surgical History:   Procedure Laterality Date     ENT SURGERY       ETHMOIDECTOMY  6/16/2014    Procedure: ETHMOIDECTOMY;  Surgeon: Jimbo Yang MD;  Location: [place] OR     [facility] ENDOSCOPIC SINUS SURGERY Bilateral 12/3/2019    Procedure: Bilateral endoscopic maxillary antrostomies with tissue removal, bilateral endoscopic total ethmoidectomies, bilateral endoscopic sphenoidotomies, bilateral endoscopic frontal sinusotomies, bilateral endoscopic nasal polypectomy, image guidance;  Surgeon: Josh Swartz MD;  Location:  OR     SURGICAL HISTORY  OF - 2010    Nasal Polyp; 3 total surgeries: 1997     Social History     Socioeconomic History     Marital status:      Spouse name: None     Number of children: None     Years of education: None     Highest education level: None   Occupational History     None   Social Needs     Financial resource strain: None     Food insecurity     Worry: None     Inability: None     Transportation needs     Medical: None     Non-medical: None   Tobacco Use     Smoking status: Never Smoker     Smokeless tobacco: Never Used   Substance and Sexual Activity     Alcohol use: Yes     Comment: wine or mixed 2 a month or more.     Drug use: No     Sexual activity: Yes     Partners: Female   Lifestyle     Physical activity     Days per week: None     Minutes per session: None     Stress: None   Relationships     Social connections     Talks on phone: None     Gets together: None     Attends Islam service: None     Active member of club or organization: None     Attends meetings of clubs or organizations: None     Relationship status: None     Intimate partner violence     Fear of current or ex partner: None     Emotionally abused: None     Physically abused: None     Forced sexual activity: None   Other Topics Concern     Parent/sibling w/ CABG, MI or angioplasty before 65F 55M? No      Service No     Blood Transfusions No     Caffeine Concern Yes     Comment: 2 cups a day     Occupational Exposure No     Hobby Hazards Yes     Comment: motorcycle     Sleep Concern No     Stress Concern No     Weight Concern No     Special Diet Yes     Comment: multi vit     Back Care No     Exercise No     Bike Helmet Yes     Seat Belt Yes     Self-Exams Not Asked   Social History Narrative    October 9, 2020    ENVIRONMENTAL HISTORY: [place] The [place] is heated with a forced air. They do have central air conditioning. The patient's bedroom is furnished with carpeting in bedroom and  allergen mattress cover.  Pets inside the house include 2 cats. There is no history of cockroach or mice infestation. There are no smokers in the house.  The house does not have a damp basement.            Review of Systems   Constitutional: Negative for activity change, fatigue and fever.   HENT: Negative for congestion, dental problem, ear pain, facial swelling, nosebleeds, postnasal drip, rhinorrhea, sinus pressure and sneezing.    Eyes: Negative for discharge, redness and itching.   Respiratory: Negative for cough, chest tightness, shortness of breath and wheezing.    Cardiovascular: Negative for chest pain.   Gastrointestinal: Negative for diarrhea, nausea and vomiting.   Musculoskeletal: Negative for arthralgias, joint swelling and myalgias.   Skin: Negative for rash.   Neurological: Negative for headaches.   Hematological: Negative for adenopathy.   Psychiatric/Behavioral: Negative for behavioral problems and self-injury.           Current Outpatient Medications:      albuterol (PROAIR HFA/PROVENTIL HFA/VENTOLIN HFA) 108 (90 Base) MCG/ACT inhaler, Inhale 2 puffs into the lungs every 4 hours as needed for shortness of breath / dyspnea, Disp: 1 Inhaler, Rfl: 11     Dupilumab (DUPIXENT) 300 MG/2ML syringe, Inject 2 mLs (300 mg) Subcutaneous every 14 days, Disp: 2 Syringe, Rfl: 11     montelukast (SINGULAIR) 10 MG tablet, Take 1 tablet (10 mg) by mouth At Bedtime, Disp: 90 tablet, Rfl: 3     Multiple Vitamin (MULTIVITAMIN) per tablet, Take 1 tablet by mouth daily., Disp: 100 tablet, Rfl: 12     sildenafil (REVATIO) 20 MG tablet, Take 1 tablet (20 mg) by mouth three times daily for pulmonary hypertension., Disp: 30 tablet, Rfl: 11     azelastine (ASTELIN) 0.1 % nasal spray, Spray 2 sprays into both nostrils 2 times daily as needed for rhinitis (Patient not taking: Reported on 2/5/2020), Disp: 30 mL, Rfl: 3     budesonide (PULMICORT) 0.5 MG/2ML neb solution, Place 0.5 mg/2 mL budesonide vial in 8 oz normal saline  sinus rinse bottle.  Irrigate each nostril with one half of the bottle twice daily. (Patient not taking: Reported on 2/5/2020), Disp: 360 mL, Rfl: 3     fluticasone-vilanterol (BREO ELLIPTA) 200-25 MCG/INH inhaler, Inhale 1 puff into the lungs daily (Patient not taking: Reported on 2/5/2020), Disp: 1 Inhaler, Rfl: 6     valACYclovir (VALTREX) 500 MG tablet, Take 2 tablets (1,000 mg) by mouth 2 times daily for 10 days (Patient not taking: Reported on 2/5/2020), Disp: 40 tablet, Rfl: 5  Immunization History   Administered Date(s) Administered     HepB-Adult 04/02/2019     Influenza Vaccine IM > 6 months Valent IIV4 10/25/2013, 11/02/2017, 10/26/2018     Pneumococcal 23 valent 10/25/2013     TDAP Vaccine (Adacel) 10/11/2010     Allergies   Allergen Reactions     Advil [Ibuprofen Micronized] Other (See Comments)     Tight chest     Asa [Aspirin] Nausea and Vomiting     Asthma - tight chest     Hazelnuts [Nuts] Other (See Comments)     Chest tight. Positive SPT. Tolerates other tree nuts and peanut.     Naprosyn [Naproxen] GI Disturbance     Stomach      OBJECTIVE:                                                                 /58 (BP Location: Left arm, Patient Position: Sitting, Cuff Size: Adult Regular)   Pulse 64   Temp 97  F (36.1  C) (Tympanic)   Wt 88.1 kg (194 lb 3.6 oz)   SpO2 97%   BMI 29.53 kg/m          Physical Exam  Vitals signs and nursing note reviewed.   Constitutional:       General: He is not in acute distress.     Appearance: He is not diaphoretic.   HENT:      Head: Normocephalic and atraumatic.      Right Ear: Tympanic membrane, ear canal and external ear normal.      Left Ear: Tympanic membrane, ear canal and external ear normal.      Nose: No mucosal edema or rhinorrhea.      Right Turbinates: Not enlarged, swollen or pale.      Left Turbinates: Not enlarged, swollen or pale.      Mouth/Throat:      Lips: Pink.      Mouth: Mucous membranes are moist.      Pharynx: Oropharynx is clear.  No pharyngeal swelling, oropharyngeal exudate or posterior oropharyngeal erythema.   Eyes:      General:         Right eye: No discharge.         Left eye: No discharge.      Conjunctiva/sclera: Conjunctivae normal.   Neck:      Musculoskeletal: Normal range of motion.   Cardiovascular:      Rate and Rhythm: Normal rate and regular rhythm.      Heart sounds: Normal heart sounds.   Pulmonary:      Effort: Pulmonary effort is normal. No respiratory distress.      Breath sounds: Normal breath sounds and air entry. No stridor, decreased air movement or transmitted upper airway sounds. No decreased breath sounds, wheezing, rhonchi or rales.   Musculoskeletal: Normal range of motion.   Skin:     General: Skin is warm.      Capillary Refill: Capillary refill takes less than 2 seconds.   Neurological:      Mental Status: He is alert and oriented to person, place, and time.   Psychiatric:         Mood and Affect: Mood normal.         Behavior: Behavior normal.         WORKUP:   ACT Score:  25    ASSESSMENT/PLAN:    Chronic pansinusitis  Nasal polyposis  Samter's triad  Severe persistent asthma without complication   Pretty much, the patient is asymptomatic on dupilumab 300 mg every 2 weeks.  He states that his sense of smell and taste significantly improved.  He denies any rhinoconjunctivitis symptoms or asthma symptoms.  No nasal obstruction.  His nasal exam is benign today.  -Continue dupilumab 300 mg every 2 weeks.  -Continue using albuterol inhaler 2 to 4 puffs every 4 hours as needed for persistent cough/chest tightness/wheezing/shortness of breath.  -Will get interval PFT.  -Considering aspirin-exacerbated respiratory disease pathophysiology, I still think that having montelukast on board is important.  I recommend restarting it after PFT.    - Dupilumab (DUPIXENT) 300 MG/2ML syringe  Dispense: 2 Syringe; Refill: 11  - montelukast (SINGULAIR) 10 MG tablet  Dispense: 90 tablet; Refill: 3  - General PFT Lab (Please  always keep checked)  - Pulmonary Function Test  - albuterol (PROAIR HFA/PROVENTIL HFA/VENTOLIN HFA) 108 (90 Base) MCG/ACT inhaler  Dispense: 1 Inhaler; Refill: 11     Return in about 1 year (around 10/9/2021), or if symptoms worsen or fail to improve.    Thank you for allowing us to participate in the care of this patient. Please feel free to contact us if there are any questions or concerns about the patient.    Disclaimer: This note consists of symbols derived from keyboarding, dictation and/or voice recognition software. As a result, there may be errors in the script that have gone undetected. Please consider this when interpreting information found in this chart.    Sudarshan Pierson MD, FAAAAI, FACEDMARI  Allergy, Asthma and Immunology    [facility]        Again, thank you for allowing me to participate in the care of your patient.        Sincerely,        Sudarshan Pierson MD

## 2020-10-09 NOTE — PROGRESS NOTES
SUBJECTIVE:                                                                   Marco Stovall presents today to our Allergy Clinic at [facility] for a follow up visit.  He is a 55 year old male with nasal polyposis, asthma, and aspirin sensitivity, consistent with aspirin exacerbated respiratory disease.    Nasal polyposis was started when he was 26 years old.  He was getting polypectomy every 4 to 5 years.  In his 40s, he started having asthma symptoms.  In his 30s, he took Excedrin for pain, within 30 to 60 minutes, he developed chest tightness, rhinoconjunctivitis symptoms, and vomiting.  He had several episodes like this.  He tolerates acetaminophen without issues.   Percutaneous skin puncture testing for aeroallergens performed on October 26, 2018 showed sensitivity to dust mite (Dermatophagoides pteronyssinus), grass pollen (Azeem grass and grass mix #7) tree pollen (Hackberry and Oak), weed pollen( ragweed mix, Russian thistle), and Alternaria mold.   In 2018, normal CBC with differential without hypereosinophilia noted.  Normal total serum IgE.     In 2018, his FEV1 was in high 50s-low 60s.  The patient was recommended to use Breo Ellipta 200/25 mcg 1 puff once daily and montelukast 10 mg by mouth once daily.  He started dupilumab in Winter 2020, 300 mg every 2 weeks. States it helps him tremendously.   He stopped Breo and montelukast soon after starting dupilumab.    Marco  is using albuterol HFA  less than twice per week for rescue from chest symptoms. he is waking up less than twice per month due to chest symptoms.  There has been no use of oral steroids since last visit. No ED/PCP/urgent care/other specialist visits for asthma flare since the previous visit. The patient denies current chest tightness, cough, wheeze, or shortness of breath.   He stopped using nasal rinses with budesonide in Winter.   The patient denies clear rhinorrhea, nasal itch, stuffiness, sneezing, or  interval sinusitis symptoms of fever, facial pain, or purulent rhinorrhea.  The sense of smell and taste significantly improved. Overall, Marco is very pleased with dupilumab efficiency.      Patient Active Problem List   Diagnosis     Screening for hypertension     Chronic maxillary sinusitis     Hyperlipidemia LDL goal <130     Severe persistent asthma without complication     Nasal polyp     Genital herpes simplex, unspecified site     Samter's triad     Aspirin sensitivity     Seasonal allergic rhinitis due to pollen     Allergic rhinitis due to dust mite     Chronic pansinusitis     Nasal polyposis     History of endoscopic sinus surgery       Past Medical History:   Diagnosis Date     Mild intermittent asthma without complication 3/24/2016     Polyp of nasal cavity       Problem (# of Occurrences) Relation (Name,Age of Onset)    Diabetes (1) Maternal Grandfather    Hypertension (2) Mother, Father    Thyroid Disease (2) Mother: hypothyroidism, Daughter: hypothyroidism       Negative family history of: Asthma, C.A.D., Cerebrovascular Disease, Breast Cancer, Cancer - colorectal, Prostate Cancer        Past Surgical History:   Procedure Laterality Date     ENT SURGERY       ETHMOIDECTOMY  6/16/2014    Procedure: ETHMOIDECTOMY;  Surgeon: Jimbo Yang MD;  Location: [facility] OR     [employer] SYSTEM ENDOSCOPIC SINUS SURGERY Bilateral 12/3/2019    Procedure: Bilateral endoscopic maxillary antrostomies with tissue removal, bilateral endoscopic total ethmoidectomies, bilateral endoscopic sphenoidotomies, bilateral endoscopic frontal sinusotomies, bilateral endoscopic nasal polypectomy, image guidance;  Surgeon: Josh Swartz MD;  Location:  OR     SURGICAL HISTORY OF -   2010    Nasal Polyp; 3 total surgeries: 1997     Social History     Socioeconomic History     Marital status:      Spouse name: None     Number of children: None     Years of education: None     Highest education level: None    Occupational History     None   Social Needs     Financial resource strain: None     Food insecurity     Worry: None     Inability: None     Transportation needs     Medical: None     Non-medical: None   Tobacco Use     Smoking status: Never Smoker     Smokeless tobacco: Never Used   Substance and Sexual Activity     Alcohol use: Yes     Comment: wine or mixed 2 a month or more.     Drug use: No     Sexual activity: Yes     Partners: Female   Lifestyle     Physical activity     Days per week: None     Minutes per session: None     Stress: None   Relationships     Social connections     Talks on phone: None     Gets together: None     Attends [employer] service: None     Active member of club or organization: None     Attends meetings of clubs or organizations: None     Relationship status: None     Intimate partner violence     Fear of current or ex partner: None     Emotionally abused: None     Physically abused: None     Forced sexual activity: None   Other Topics Concern     Parent/sibling w/ CABG, MI or angioplasty before 65F 55M? No      Service No     Blood Transfusions No     Caffeine Concern Yes     Comment: 2 cups a day     Occupational Exposure No     Hobby Hazards Yes     Comment: motorcycle     Sleep Concern No     Stress Concern No     Weight Concern No     Special Diet Yes     Comment: multi vit     Back Care No     Exercise No     Bike Helmet Yes     Seat Belt Yes     Self-Exams Not Asked   Social History Narrative    October 9, 2020    ENVIRONMENTAL HISTORY: [place] The home is heated with a forced air. They do have central air conditioning. The patient's bedroom is furnished with carpeting in bedroom and allergen mattress cover.  Pets inside the house include 2 cats. There is no history of cockroach or mice infestation. There are no smokers in the house.  The house does not have a damp basement.            Review of Systems    Constitutional: Negative for activity change, fatigue and fever.   HENT: Negative for congestion, dental problem, ear pain, facial swelling, nosebleeds, postnasal drip, rhinorrhea, sinus pressure and sneezing.    Eyes: Negative for discharge, redness and itching.   Respiratory: Negative for cough, chest tightness, shortness of breath and wheezing.    Cardiovascular: Negative for chest pain.   Gastrointestinal: Negative for diarrhea, nausea and vomiting.   Musculoskeletal: Negative for arthralgias, joint swelling and myalgias.   Skin: Negative for rash.   Neurological: Negative for headaches.   Hematological: Negative for adenopathy.   Psychiatric/Behavioral: Negative for behavioral problems and self-injury.           Current Outpatient Medications:      albuterol (PROAIR HFA/PROVENTIL HFA/VENTOLIN HFA) 108 (90 Base) MCG/ACT inhaler, Inhale 2 puffs into the lungs every 4 hours as needed for shortness of breath / dyspnea, Disp: 1 Inhaler, Rfl: 11     Dupilumab (DUPIXENT) 300 MG/2ML syringe, Inject 2 mLs (300 mg) Subcutaneous every 14 days, Disp: 2 Syringe, Rfl: 11     montelukast (SINGULAIR) 10 MG tablet, Take 1 tablet (10 mg) by mouth At Bedtime, Disp: 90 tablet, Rfl: 3     Multiple Vitamin (MULTIVITAMIN) per tablet, Take 1 tablet by mouth daily., Disp: 100 tablet, Rfl: 12     sildenafil (REVATIO) 20 MG tablet, Take 1 tablet (20 mg) by mouth three times daily for pulmonary hypertension., Disp: 30 tablet, Rfl: 11     azelastine (ASTELIN) 0.1 % nasal spray, Spray 2 sprays into both nostrils 2 times daily as needed for rhinitis (Patient not taking: Reported on 2/5/2020), Disp: 30 mL, Rfl: 3     budesonide (PULMICORT) 0.5 MG/2ML neb solution, Place 0.5 mg/2 mL budesonide vial in 8 oz normal saline sinus rinse bottle.  Irrigate each nostril with one half of the bottle twice daily. (Patient not taking: Reported on 2/5/2020), Disp: 360 mL, Rfl: 3     fluticasone-vilanterol (BREO ELLIPTA) 200-25 MCG/INH inhaler, Inhale 1  puff into the lungs daily (Patient not taking: Reported on 2/5/2020), Disp: 1 Inhaler, Rfl: 6     valACYclovir (VALTREX) 500 MG tablet, Take 2 tablets (1,000 mg) by mouth 2 times daily for 10 days (Patient not taking: Reported on 2/5/2020), Disp: 40 tablet, Rfl: 5  Immunization History   Administered Date(s) Administered     HepB-Adult 04/02/2019     Influenza Vaccine IM > 6 months Valent IIV4 10/25/2013, 11/02/2017, 10/26/2018     Pneumococcal 23 valent 10/25/2013     TDAP Vaccine (Adacel) 10/11/2010     Allergies   Allergen Reactions     Advil [Ibuprofen Micronized] Other (See Comments)     Tight chest     Asa [Aspirin] Nausea and Vomiting     Asthma - tight chest     Hazelnuts [Nuts] Other (See Comments)     Chest tight. Positive SPT. Tolerates other tree nuts and peanut.     Naprosyn [Naproxen] GI Disturbance     Stomach      OBJECTIVE:                                                                 /58 (BP Location: Left arm, Patient Position: Sitting, Cuff Size: Adult Regular)   Pulse 64   Temp 97  F (36.1  C) (Tympanic)   Wt 88.1 kg (194 lb 3.6 oz)   SpO2 97%   BMI 29.53 kg/m          Physical Exam  Vitals signs and nursing note reviewed.   Constitutional:       General: He is not in acute distress.     Appearance: He is not diaphoretic.   HENT:      Head: Normocephalic and atraumatic.      Right Ear: Tympanic membrane, ear canal and external ear normal.      Left Ear: Tympanic membrane, ear canal and external ear normal.      Nose: No mucosal edema or rhinorrhea.      Right Turbinates: Not enlarged, swollen or pale.      Left Turbinates: Not enlarged, swollen or pale.      Mouth/Throat:      Lips: Pink.      Mouth: Mucous membranes are moist.      Pharynx: Oropharynx is clear. No pharyngeal swelling, oropharyngeal exudate or posterior oropharyngeal erythema.   Eyes:      General:         Right eye: No discharge.         Left eye: No discharge.      Conjunctiva/sclera: Conjunctivae normal.   Neck:       Musculoskeletal: Normal range of motion.   Cardiovascular:      Rate and Rhythm: Normal rate and regular rhythm.      Heart sounds: Normal heart sounds.   Pulmonary:      Effort: Pulmonary effort is normal. No respiratory distress.      Breath sounds: Normal breath sounds and air entry. No stridor, decreased air movement or transmitted upper airway sounds. No decreased breath sounds, wheezing, rhonchi or rales.   Musculoskeletal: Normal range of motion.   Skin:     General: Skin is warm.      Capillary Refill: Capillary refill takes less than 2 seconds.   Neurological:      Mental Status: He is alert and oriented to person, place, and time.   Psychiatric:         Mood and Affect: Mood normal.         Behavior: Behavior normal.         WORKUP:   ACT Score:  25    ASSESSMENT/PLAN:    Chronic pansinusitis  Nasal polyposis  Samter's triad  Severe persistent asthma without complication   Pretty much, the patient is asymptomatic on dupilumab 300 mg every 2 weeks.  He states that his sense of smell and taste significantly improved.  He denies any rhinoconjunctivitis symptoms or asthma symptoms.  No nasal obstruction.  His nasal exam is benign today.  -Continue dupilumab 300 mg every 2 weeks.  -Continue using albuterol inhaler 2 to 4 puffs every 4 hours as needed for persistent cough/chest tightness/wheezing/shortness of breath.  -Will get interval PFT.  -Considering aspirin-exacerbated respiratory disease pathophysiology, I still think that having montelukast on board is important.  I recommend restarting it after PFT.    - Dupilumab (DUPIXENT) 300 MG/2ML syringe  Dispense: 2 Syringe; Refill: 11  - montelukast (SINGULAIR) 10 MG tablet  Dispense: 90 tablet; Refill: 3  - General PFT Lab (Please always keep checked)  - Pulmonary Function Test  - albuterol (PROAIR HFA/PROVENTIL HFA/VENTOLIN HFA) 108 (90 Base) MCG/ACT inhaler  Dispense: 1 Inhaler; Refill: 11      Need for prophylactic vaccination and inoculation against  influenza    - INFLUENZA QUAD, RECOMBINANT, P-FREE (RIV4) (FLUBLOCK) [17979]  - Vaccine Administration, Initial [38107]       Return in about 1 year (around 10/9/2021), or if symptoms worsen or fail to improve.    Thank you for allowing us to participate in the care of this patient. Please feel free to contact us if there are any questions or concerns about the patient.    Disclaimer: This note consists of symbols derived from keyboarding, dictation and/or voice recognition software. As a result, there may be errors in the script that have gone undetected. Please consider this when interpreting information found in this chart.    Sudarshan Pierson MD, FAAAAI, FACEDMARI  Allergy, Asthma and Immunology    [facility] [place]

## 2020-10-09 NOTE — PATIENT INSTRUCTIONS
-Continue using albuterol inhaler 2-4 puffs every 4 hours as needed for chest tightness/wheezing/shortness of breath/persistent cough.  Restart montelukast.    Call to schedule pulmonary function test:    [phone]

## 2020-10-10 ASSESSMENT — ASTHMA QUESTIONNAIRES: ACT_TOTALSCORE: 25

## 2020-11-16 ENCOUNTER — HEALTH MAINTENANCE LETTER (OUTPATIENT)
Age: 55
End: 2020-11-16

## 2021-03-25 ENCOUNTER — IMMUNIZATION (OUTPATIENT)
Dept: NURSING | Facility: CLINIC | Age: 56
End: 2021-03-25
Payer: COMMERCIAL

## 2021-03-25 PROCEDURE — 0031A PR COVID VAC JANSSEN AD26 0.5ML: CPT

## 2021-03-25 PROCEDURE — 91303 PR COVID VAC JANSSEN AD26 0.5ML: CPT

## 2021-04-13 ENCOUNTER — HOSPITAL ENCOUNTER (EMERGENCY)
Facility: CLINIC | Age: 56
Discharge: HOME | End: 2021-04-13
Attending: EMERGENCY MEDICINE | Admitting: EMERGENCY MEDICINE
Payer: COMMERCIAL

## 2021-04-13 ENCOUNTER — NURSE TRIAGE (OUTPATIENT)
Dept: NURSING | Facility: CLINIC | Age: 56
End: 2021-04-13

## 2021-04-13 ENCOUNTER — APPOINTMENT (OUTPATIENT)
Dept: ULTRASOUND IMAGING | Facility: CLINIC | Age: 56
End: 2021-04-13
Attending: EMERGENCY MEDICINE
Payer: COMMERCIAL

## 2021-04-13 ENCOUNTER — APPOINTMENT (OUTPATIENT)
Dept: GENERAL RADIOLOGY | Facility: CLINIC | Age: 56
End: 2021-04-13
Attending: EMERGENCY MEDICINE
Payer: COMMERCIAL

## 2021-04-13 VITALS
TEMPERATURE: 97.5 F | RESPIRATION RATE: 20 BRPM | OXYGEN SATURATION: 98 % | BODY MASS INDEX: 28.04 KG/M2 | WEIGHT: 185 LBS | HEART RATE: 63 BPM | HEIGHT: 68 IN

## 2021-04-13 DIAGNOSIS — M79.652 PAIN OF LEFT THIGH: ICD-10-CM

## 2021-04-13 PROCEDURE — 99284 EMERGENCY DEPT VISIT MOD MDM: CPT | Mod: 25 | Performed by: EMERGENCY MEDICINE

## 2021-04-13 PROCEDURE — 99282 EMERGENCY DEPT VISIT SF MDM: CPT | Performed by: EMERGENCY MEDICINE

## 2021-04-13 PROCEDURE — 73552 X-RAY EXAM OF FEMUR 2/>: CPT | Mod: LT

## 2021-04-13 PROCEDURE — 93971 EXTREMITY STUDY: CPT | Mod: LT

## 2021-04-13 ASSESSMENT — MIFFLIN-ST. JEOR: SCORE: 1653.65

## 2021-04-13 NOTE — TELEPHONE ENCOUNTER
Leg pain last few days left side, no known injury.    Hurts worse when walking. No hot, lump or bulge.    Had J&J immunization last month.    Needs to be seen in clinic today or ER.    Caller agrees with care advice given. Agreed to call back if patient has additional symptoms or questions.    Haylee Dodd RN  [facility] Nurse Advisor    Additional Information    Negative: Looks like a broken bone or dislocated joint (e.g., crooked or deformed)    Negative: Sounds like a life-threatening emergency to the triager    Negative: Followed a hip injury    Negative: Followed a knee injury    Negative: Followed an ankle or foot injury    Negative: Back pain radiating (shooting) into leg(s)    Negative: Foot pain is the main symptom    Negative: Ankle pain is the main symptom    Negative: Knee pain is the main symptom    Negative: Leg swelling is the main symptom    Negative: Chest pain    Negative: Difficulty breathing    Negative: Entire foot is cool or blue in comparison to other side    Negative: Unable to walk    Thigh or calf pain in only one leg and present > 1 hour    Protocols used: LEG PAIN-A-OH

## 2021-04-13 NOTE — ED PROVIDER NOTES
History     Chief Complaint   Patient presents with     Leg Pain     left upper, outer thigh, constant pain x 3 days. unable to find a position of comfort. denies injury     HPI  Marco Stovall is a 55 year old male with a history of Samter's triad and allergies who presents emergency department complaining of left thigh pain.  Patient states this came on suddenly yesterday.  It is in the posterior lateral aspect of his left mid to upper thigh region.  Says sharp pain that does not radiate down his leg.  The pain is worsened with activity currently rates it moderate in nature.  Patient has not had any trauma to the region.  He denies fevers or chills he has not been ill lately he is not short of breath has not had a cough.  Did get his Azeem & Azeem vaccine roughly 1 week ago.  He is concerned of possible blood clot.    Allergies:  Allergies   Allergen Reactions     Advil [Ibuprofen Micronized] Other (See Comments)     Tight chest     Asa [Aspirin] Nausea and Vomiting     Asthma - tight chest     Hazelnuts [Nuts] Other (See Comments)     Chest tight. Positive SPT. Tolerates other tree nuts and peanut.     Naprosyn [Naproxen] GI Disturbance     Stomach        Problem List:    Patient Active Problem List    Diagnosis Date Noted     Chronic pansinusitis 11/06/2019     Priority: Medium     Added automatically from request for surgery [id number]       Nasal polyposis 11/06/2019     Priority: Medium     Added automatically from request for surgery [id number]       History of endoscopic sinus surgery 11/06/2019     Priority: Medium     Added automatically from request for surgery [id number]       Seasonal allergic rhinitis due to pollen 10/27/2018     Priority: Medium     Percutaneous skin puncture testing for aeroallergens performed on October 26, 2018 showed sensitivity to dust mite (Dermatophagoides pteronyssinus), grass pollen (Azeem grass and grass mix #7) tree pollen (Hackberry, and oak), weed pollen( ragweed mix,  Russian thistle) and Alternaria mold.       Allergic rhinitis due to dust mite 10/27/2018     Priority: Medium     Aspirin sensitivity 10/26/2018     Priority: Medium     Samter's triad 09/14/2018     Priority: Medium     Nasal polyp 08/22/2018     Priority: Medium     Genital herpes simplex, unspecified site 08/22/2018     Priority: Medium     Severe persistent asthma without complication 03/24/2016     Priority: Medium     Hyperlipidemia LDL goal <130 07/20/2011     Priority: Medium     , July, 2011.        Chronic maxillary sinusitis 07/15/2011     Priority: Medium     Seen at [facility], and Dr. Yang. Surgery in 2010.        Screening for hypertension 10/16/2007     Priority: Medium        Past Medical History:    Past Medical History:   Diagnosis Date     Mild intermittent asthma without complication 3/24/2016     Polyp of nasal cavity        Past Surgical History:    Past Surgical History:   Procedure Laterality Date     ENT SURGERY       ETHMOIDECTOMY  6/16/2014    Procedure: ETHMOIDECTOMY;  Surgeon: Jimbo Yang MD;  Location: [facility] OR     [employer] SYSTEM ENDOSCOPIC SINUS SURGERY Bilateral 12/3/2019    Procedure: Bilateral endoscopic maxillary antrostomies with tissue removal, bilateral endoscopic total ethmoidectomies, bilateral endoscopic sphenoidotomies, bilateral endoscopic frontal sinusotomies, bilateral endoscopic nasal polypectomy, image guidance;  Surgeon: Josh Swartz MD;  Location:  OR     SURGICAL HISTORY OF -   2010    Nasal Polyp; 3 total surgeries: 1997       Family History:    Family History   Problem Relation Age of Onset     Hypertension Mother      Thyroid Disease Mother         hypothyroidism     Hypertension Father      Diabetes Maternal Grandfather      Thyroid Disease Daughter         hypothyroidism     Asthma No family hx of      C.A.D. No family hx of      Cerebrovascular Disease No family hx of      Breast Cancer No family hx of      Cancer  "- colorectal No family hx of      Prostate Cancer No family hx of        Social History:  Marital Status:   [4]  Social History     Tobacco Use     Smoking status: Never Smoker     Smokeless tobacco: Never Used   Substance Use Topics     Alcohol use: Yes     Comment: wine or mixed 2 a month or more.     Drug use: No        Medications:    albuterol (PROAIR HFA/PROVENTIL HFA/VENTOLIN HFA) 108 (90 Base) MCG/ACT inhaler  azelastine (ASTELIN) 0.1 % nasal spray  budesonide (PULMICORT) 0.5 MG/2ML neb solution  Dupilumab (DUPIXENT) 300 MG/2ML syringe  fluticasone-vilanterol (BREO ELLIPTA) 200-25 MCG/INH inhaler  montelukast (SINGULAIR) 10 MG tablet  Multiple Vitamin (MULTIVITAMIN) per tablet  sildenafil (REVATIO) 20 MG tablet  valACYclovir (VALTREX) 500 MG tablet          Review of Systems  As per HPI.  Physical Exam   Pulse: 63  Temp: 97.5  F (36.4  C)  Resp: 20  Height: 172.7 cm (5' 8\")  Weight: 83.9 kg (185 lb)  SpO2: 98 %      Physical Exam  Vitals signs and nursing note reviewed.   Constitutional:       General: He is not in acute distress.     Appearance: Normal appearance. He is not ill-appearing or toxic-appearing.   HENT:      Head: Normocephalic.   Neck:      Musculoskeletal: Normal range of motion.   Cardiovascular:      Pulses: Normal pulses.   Pulmonary:      Effort: Pulmonary effort is normal.   Musculoskeletal:      Comments: There is tenderness to palpation of the posterior and lateral aspects of the proximal thigh on the left.  No erythema or edema is noted there is no obvious hip tenderness knee tenderness or ankle tenderness on the left.  There is no calf tenderness.  Pulses sensation symmetrical.   Skin:     General: Skin is warm and dry.      Findings: No erythema or rash.   Neurological:      General: No focal deficit present.      Mental Status: He is alert.      Motor: No weakness.      Coordination: Coordination normal.   Psychiatric:         Mood and Affect: Mood normal.         ED Course "        Procedures               Critical Care time:  none               Results for orders placed or performed during the hospital encounter of 04/13/21 (from the past 24 hour(s))   US Lower Extremity Venous Duplex Left    Narrative    VENOUS ULTRASOUND LEFT LOWER EXTREMITY April 13, 2021 11:20 AM     HISTORY: Left thigh pain.    COMPARISON: None.    TECHNIQUE: Color Doppler and spectral waveform analysis performed  throughout the deep veins of the left lower extremity.    FINDINGS: The left common femoral, femoral, and popliteal veins  demonstrate normal blood flow, compression, and augmentation. The  posterior tibial and peroneal veins are compressible. Contralateral  right common femoral vein is patent.      Impression    IMPRESSION: Negative for deep venous thrombosis throughout the left  lower extremity.    EMILEE BARBER MD   XR Femur Left 2 Views    Narrative    XR FEMUR LT 2 VW   4/13/2021 11:31 AM     HISTORY:  L thigh pain      Impression    IMPRESSION: Unremarkable exam.    BEBO BOLANOS MD       Medications - No data to display    Assessments & Plan (with Medical Decision Making) records were reviewed.  This pain is more likely musculoskeletal or neurogenic but I feel ruling out if occult fracture or a DVT is warranted.  Patient does have concerns secondary to Azeem & Azeem shot a week ago.  X-ray of the femur on the left was a venous Doppler ultrasound were obtained.  X-ray was unremarkable for fracture or other abnormality and there was no DVT present on ultrasound.  Findings were discussed with patient.  This could be a musculoskeletal or neurogenic pain.  There is not any evidence of infection or abscess.  I do not think him further imaging studies or labs at this time I wanted he should try heat to the area and take ibuprofen or Tylenol as needed.  He should keep an eye on this area and if increased pain swelling numbness weakness or other symptoms present he should return for recheck.  Patient  is agree with this plan.     I have reviewed the nursing notes.    I have reviewed the findings, diagnosis, plan and need for follow up with the patient.       New Prescriptions    No medications on file       Final diagnoses:   Pain of left thigh       4/13/2021   [facility] EMERGENCY DEPT     Andrew Lacey MD  04/13/21 2001

## 2021-04-13 NOTE — DISCHARGE INSTRUCTIONS
Return if symptoms worsen or new symptoms develop.  Follow-up with primary care physician next available.  Drink plenty of fluids.  If increased pain any swelling erythema and edema occur numbness weakness or other symptoms present please return for further evaluation and care.  Take Tylenol for pain and add ibuprofen if you can.  Try heat and stretching.  X-ray hand venous Doppler ultrasound were both negative.

## 2021-04-13 NOTE — ED NOTES
Pt presents to ER concerned he may have a blood clot in his LLE.    Pt reports pain in L buttock that extends down L posterior thigh.  No recent flights nor long car trips.  He is a non smoker and has no clotting disorders. No recent injury to the area.

## 2021-09-12 ENCOUNTER — HEALTH MAINTENANCE LETTER (OUTPATIENT)
Age: 56
End: 2021-09-12

## 2021-09-15 DIAGNOSIS — J33.9 NASAL POLYPOSIS: Primary | ICD-10-CM

## 2022-01-02 ENCOUNTER — HEALTH MAINTENANCE LETTER (OUTPATIENT)
Age: 57
End: 2022-01-02

## 2022-01-21 ENCOUNTER — OFFICE VISIT (OUTPATIENT)
Dept: FAMILY MEDICINE | Facility: CLINIC | Age: 57
End: 2022-01-21
Payer: COMMERCIAL

## 2022-01-21 VITALS
DIASTOLIC BLOOD PRESSURE: 86 MMHG | TEMPERATURE: 96.3 F | HEART RATE: 59 BPM | SYSTOLIC BLOOD PRESSURE: 147 MMHG | HEIGHT: 67 IN | OXYGEN SATURATION: 97 % | RESPIRATION RATE: 14 BRPM | BODY MASS INDEX: 30.7 KG/M2 | WEIGHT: 195.6 LBS

## 2022-01-21 DIAGNOSIS — N50.812 LEFT TESTICULAR PAIN: Primary | ICD-10-CM

## 2022-01-21 DIAGNOSIS — J45.909 SAMTER'S TRIAD: ICD-10-CM

## 2022-01-21 DIAGNOSIS — Z88.6 SAMTER'S TRIAD: ICD-10-CM

## 2022-01-21 DIAGNOSIS — J33.9 SAMTER'S TRIAD: ICD-10-CM

## 2022-01-21 DIAGNOSIS — R03.0 ELEVATED BLOOD PRESSURE READING WITHOUT DIAGNOSIS OF HYPERTENSION: ICD-10-CM

## 2022-01-21 PROCEDURE — 99214 OFFICE O/P EST MOD 30 MIN: CPT | Mod: 95 | Performed by: PHYSICIAN ASSISTANT

## 2022-01-21 RX ORDER — SULFAMETHOXAZOLE/TRIMETHOPRIM 800-160 MG
1 TABLET ORAL 2 TIMES DAILY
Qty: 20 TABLET | Refills: 0 | Status: SHIPPED | OUTPATIENT
Start: 2022-01-21 | End: 2022-01-31

## 2022-01-21 ASSESSMENT — PAIN SCALES - GENERAL: PAINLEVEL: MILD PAIN (2)

## 2022-01-21 ASSESSMENT — MIFFLIN-ST. JEOR: SCORE: 1687.87

## 2022-01-21 NOTE — PROGRESS NOTES
Assessment & Plan   Problem List Items Addressed This Visit        Respiratory    Samter's triad (Chronic)    Relevant Medications    sulfamethoxazole-trimethoprim (BACTRIM DS) 800-160 MG tablet      Other Visit Diagnoses     Left testicular pain    -  Primary    Relevant Medications    sulfamethoxazole-trimethoprim (BACTRIM DS) 800-160 MG tablet    Other Relevant Orders    US Testicular & Scrotum w Doppler Ltd    Elevated blood pressure reading without diagnosis of hypertension             Impression is left scrotal pain from unknown etiology, but the suspicious for orchitis plus or minus epididymitis given his history of this x2.  No new sexual partners or concerns for sexually transmitted infections.  No urinary, abdominal or other symptoms.  Appears well and nontoxic and I have low suspicion for systemic illness.  Given intermittent mild discomfort without nausea/vomiting, I have a lower suspicion for testicular torsion.  I did offer the patient urgent imaging today, but he declined and I feel this is reasonable given his previous history of this.  We will treat him empirically with Bactrim course and I have ordered an ultrasound should he change his mind about imaging.  Follow-up with us in a week if not improving.  Patient initially discussed getting back on Dupixent as he had improvement with his taste and smell, however it also caused him to have body aches.  Upon further review, he declined a refill of this medication.  He also declined my offer of referral to a different allergist as he has an upcoming move.  He will contact us should he change his mind about this.    Complete history and physical exam as below. AF with normal VS except for elevated bp, which they will monitor at home and contact us if >140/90mmHg greater than 50% of the time.    DDx and Dx discussed with and explained to the pt to their satisfaction.  All questions were answered at this time. Pt expressed understanding of and agreement  "with this dx, tx, and plan. No further workup warranted and standard medication warnings given. I have given the patient a list of pertinent indications for re-evaluation. Will go to the Emergency Department if symptoms worsen or new concerning symptoms arise. Patient left in no apparent distress.     33 minutes spent on the date of the encounter doing chart review, history and exam, documentation and further activities per the note     BMI:   Estimated body mass index is 30.24 kg/m  as calculated from the following:    Height as of this encounter: 1.713 m (5' 7.44\").    Weight as of this encounter: 88.7 kg (195 lb 9.6 oz).     See Patient Instructions    Return in about 1 week (around 1/28/2022) for a recheck of your symptoms if not improving, or call 911/go to an ER anytime if worsening.    BRIANNA Lin  [facility] LETA Lara is a 56 year old who presents for the following health issues     HPI     Concern - Testicular issue  Onset: 1-2 months  Description: pain, left side  Intensity: moderate, 1-2/10  Progression of Symptoms:  same and constant  Accompanying Signs & Symptoms: No  Previous history of similar problem: Yes  Precipitating factors:        Worsened by: No  Alleviating factors:        Improved by: ice  Therapies tried and outcome: ice    Discuss getting back on Dupixent. Was able   Allergy to Tylenol now? Vomiting afterwards  Patient declined to do ACT    Review of Systems   Constitutional, HEENT, cardiovascular, pulmonary, gi and gu systems are negative, except as otherwise noted.      Objective    BP (!) 147/86   Pulse 59   Temp (!) 96.3  F (35.7  C) (Tympanic)   Resp 14   Ht 1.713 m (5' 7.44\")   Wt 88.7 kg (195 lb 9.6 oz)   SpO2 97%   BMI 30.24 kg/m    Body mass index is 30.24 kg/m .  Physical Exam  Vitals and nursing note reviewed.   Constitutional:       General: He is not in acute distress.     Appearance: He is not ill-appearing or diaphoretic. "   HENT:      Head: Normocephalic and atraumatic.      Mouth/Throat:      Mouth: Mucous membranes are moist.   Eyes:      Conjunctiva/sclera: Conjunctivae normal.   Cardiovascular:      Rate and Rhythm: Normal rate and regular rhythm.      Heart sounds: Normal heart sounds. No murmur heard.  No friction rub. No gallop.    Pulmonary:      Effort: Pulmonary effort is normal. No respiratory distress.      Breath sounds: Normal breath sounds. No stridor. No wheezing, rhonchi or rales.   Abdominal:      General: Bowel sounds are normal. There is no distension.      Palpations: Abdomen is soft. There is no mass.      Tenderness: There is no abdominal tenderness. There is no guarding or rebound.      Hernia: No hernia is present.   Genitourinary:     Comments: Circumcised. No inguinal hernia or adenopathy. Left testicle and epididymis mildly tender. No blood or discharge at the meatus.  Skin:     General: Skin is warm and dry.   Neurological:      General: No focal deficit present.      Mental Status: He is alert. Mental status is at baseline.   Psychiatric:         Mood and Affect: Mood normal.         Behavior: Behavior normal.

## 2022-01-21 NOTE — PATIENT INSTRUCTIONS
Jim Lara,    Thank you for allowing [facility] to manage your care.    Your blood pressure was high today. Get a blood pressure cuff for use at home. If your blood pressure is greater than or equal to 140/90mm Hg more than half of the time, please schedule an appointment with us for a recheck.    I am unsure of the cause of your symptoms, but your exam and story is most consistent with orchitis/epididymitis. We will see what our workup shows.     If you develop worsening/changing symptoms at any time, please call 911 or go to the emergency department for evaluation.    I sent your prescriptions to your pharmacy.    Drink 8-10 glasses of fluid daily to stay well-hydrated.    I ordered an ultrasound as needed. Please call diagnostic imaging [phone] to schedule your test.    Please allow 1-2 business days for our office to contact you in regards to your laboratory/radiological studies.  If not done so, I encourage you to login into [employer] ([email or web address] to review your results as well.     If you have any questions or concerns, please feel free to call us at [phone]    Sincerely,    Guy Carvalho PA-C    Did you know?      You can schedule a video visit for follow-up appointments as well as future appointments for certain conditions.  Please see the below link.     [email or web address]    If you have not already done so,  I encourage you to sign up for [employer] ([email or web address]).  This will allow you to review your results, securely communicate with a provider, and schedule virtual visits as well.      Patient Education     Treating Epididymitis and Orchitis    You have inflammation of the epididymis (epididymitis) and testicle (orchitis). This is likely from an infection. In many cases, epididymitis and orchitis occur along with urinary tract infections. Treatment includes medicine to get rid of the infection. It  also includes medicine and other methods to ease symptoms.   Possible treatments    Antibiotics. Acute epididymitis is most often treated with oral antibiotics. You may also be given a shot (injection) of antibiotics. Be sure to take all of your medicine until it is gone, even if you feel better.    Anti-inflammatories. You may be prescribed medicine to reduce swelling and tenderness.    Rest. You will most likely need to rest for 3 to 4 days until swelling and fever are gone. When you are able, lie down with a towel folded under the scrotum to raise it slightly. This can help ease mild pain.    Scrotal support. If your testicles are swollen, wear an athletic supporter (jockstrap) or spandex shorts. This may help control swelling and ease symptoms.    Ice and heat. To ease swelling, use an ice pack wrapped in a thin towel on the scrotum. Once swelling is gone, sit in a warm bath to increase blood flow to the area. To make an ice pack, put ice in a plastic bag that seals at the top. Wrap the bag in a clean, thin towel. Never put an ice pack directly on the skin.    After treatment  The inflammation will go away with treatment. But you may have an achy feeling in the testicles for 2 to 4 weeks. This does not mean the infection has come back. The testicles just take time to heal. But if you feel a lump in a testicle after treatment, see your healthcare provider. Once the inflammation is gone, you can be active again.   If you are sexually active, your sex partner needs to see a healthcare provider as well. This is because sex can sometimes spread the infection that causes this condition.   [employer] last reviewed this educational content on 12/1/2019 2000-2021 [employer]. All rights reserved. This information is not intended as a substitute for professional medical care. Always follow your healthcare professional's instructions.           Patient Education     Testicular Pain, Unclear Cause   You have  had pain in one or both testicles. Based on your exam today, the exact cause of your pain is not certain. But your condition doesn't appear to be dangerous. Testicles are very sensitive. Even a small injury can cause quite a bit of pain. Other possible causes of testicular pain include kidney stones, cysts, mumps, inflammatory conditions, chronic conditions, hernia, infection, and a twisted testicle.  Certain tests may be done to rule out an underlying problem causing the pain. Nothing conclusive was found today. Most likely, the pain will go away on its own. If it doesn t, you may need more tests.    Home care  Medicine may be prescribed to help relieve pain and swelling. This may be an over-the-counter pain reliever or prescription pain medicine. Take all medicine as directed.  The following are general care guidelines:    To relieve pain and swelling, apply an ice pack wrapped in a thin towel for 10 minutes at a time. Continue this on and off for 1 to 2 days.    When lying down, place a small rolled towel under your scrotum. When moving around, wear a jockstrap (athletic supporter) or supportive underwear. These will help support and protect your testicles.    If it hurts to walk, walk as little as possible until you feel better.    Don't do any strenuous activity until you feel better.    Don't have sex until you feel better.    If you have severe pain in the testicle, seek care right away. Delay may lead to permanent loss of the testicle s function.  Follow-up care  Follow up with your healthcare provider, or as advised.  When to seek medical advice  Call your healthcare provider right away if any of these occur:    Fever of 100.4 F (38 C) or higher, or as directed by your provider    Worsening of the pain or severe pain    Swelling of the testicle or scrotum    A lump in the scrotum    Warm and red scrotum (signs of infection)    Nausea and vomiting    Pain or swelling in abdomen    Trouble peeing    Numbness  or weakness in the leg    Shrinking of the testicle    Blood in your urine  [employer] last reviewed this educational content on 9/1/2019 2000-2021 [employer]. All rights reserved. This information is not intended as a substitute for professional medical care. Always follow your healthcare professional's instructions.

## 2022-02-01 ENCOUNTER — ANCILLARY PROCEDURE (OUTPATIENT)
Dept: ULTRASOUND IMAGING | Facility: CLINIC | Age: 57
End: 2022-02-01
Attending: PHYSICIAN ASSISTANT
Payer: COMMERCIAL

## 2022-02-01 DIAGNOSIS — N50.812 LEFT TESTICULAR PAIN: Primary | ICD-10-CM

## 2022-02-01 DIAGNOSIS — N50.812 LEFT TESTICULAR PAIN: ICD-10-CM

## 2022-02-01 PROCEDURE — 76870 US EXAM SCROTUM: CPT | Performed by: RADIOLOGY

## 2022-06-29 ENCOUNTER — TELEPHONE (OUTPATIENT)
Dept: FAMILY MEDICINE | Facility: CLINIC | Age: 57
End: 2022-06-29

## 2022-06-29 NOTE — LETTER
June 29, 2022      Marco Stovall  [address]  [place] [address]        Dear Marco,     June 29, 2022      Marco Stovall  [address]  [place] [address]      Your healthcare team cares about your health. To provide you with the best care,   we have reviewed your chart and based on our findings, we see that you are due to:     - COLON CANCER SCREENING:  Call or mychart the clinic to schedule your colonoscopy or schedule/ your FIT Test, or Cologuard test  - OTHER FOLLOW UP:  You are due for a Routine Physical Exam.  If any injections are due they can be updated at your appointment.    If you have already completed these items, please contact the clinic via phone or   [employer] so your care team can review and update your records. Thank you for   choosing [facility] for your healthcare needs. For any questions,   concerns, or to schedule an appointment please contact the clinic.     Healthy Regards,    Your [facility] Care Team    Sincerely,    Wilmar Johnston MD/ben

## 2022-06-29 NOTE — TELEPHONE ENCOUNTER
Patient Quality Outreach    Patient is due for the following:   Asthma  -  Asthma follow-up visit  Colon Cancer Screening -  FIT  Physical  - Due after Due anytime.  Immunizations  -  TD    NEXT STEPS:   Schedule a yearly physical    Type of outreach:    Sent letter.      Questions for provider review:    None     Nena Morales, [facility]  Chart routed to none, letter sent.

## 2022-09-03 ENCOUNTER — NURSE TRIAGE (OUTPATIENT)
Dept: NURSING | Facility: CLINIC | Age: 57
End: 2022-09-03

## 2022-09-03 NOTE — TELEPHONE ENCOUNTER
Covid-19 positive today. Headache and body aches. Symptoms started last night. I connected with scheduling for a virtual visit to discuss treatment. He has asthma and an anti inflammatory issue. He can not take over the counter medications to help.  Karley Garcia RN  [facility]      Reason for Disposition    HIGH RISK for severe COVID complications (e.g., weak immune system, age > 64 years, obesity with BMI > 25, pregnant, chronic lung disease or other chronic medical condition)  (Exception: Already seen by PCP and no new or worsening symptoms.)     Anti inflammatory issues and asthma.    Additional Information    Negative: SEVERE difficulty breathing (e.g., struggling for each breath, speaks in single words)    Negative: Difficult to awaken or acting confused (e.g., disoriented, slurred speech)    Negative: Bluish (or gray) lips or face now    Negative: Shock suspected (e.g., cold/pale/clammy skin, too weak to stand, low BP, rapid pulse)    Negative: Sounds like a life-threatening emergency to the triager    Negative: [1] Diagnosed or suspected COVID-19 AND [2] symptoms lasting 3 or more weeks    Negative: [1] COVID-19 exposure AND [2] no symptoms    Negative: COVID-19 vaccine reaction suspected (e.g., fever, headache, muscle aches) occurring 1 to 3 days after getting vaccine    Negative: COVID-19 vaccine, questions about    Negative: [1] Lives with someone known to have influenza (flu test positive) AND [2] flu-like symptoms (e.g., cough, runny nose, sore throat, SOB; with or without fever)    Negative: [1] Adult with possible COVID-19 symptoms AND [2] triager concerned about severity of symptoms or other causes    Negative: COVID-19 and breastfeeding, questions about    Negative: SEVERE or constant chest pain or pressure  (Exception: Mild central chest pain, present only when coughing.)    Negative: MODERATE difficulty breathing (e.g., speaks in phrases, SOB even at rest, pulse 100-120)     Negative: [1] Headache AND [2] stiff neck (can't touch chin to chest)     Headache pain at 5    Negative: Oxygen level (e.g., pulse oximetry) 90 percent or lower    Negative: Chest pain or pressure    Negative: Patient sounds very sick or weak to the triager    Negative: MILD difficulty breathing (e.g., minimal/no SOB at rest, SOB with walking, pulse <100)    Negative: Fever > 103 F (39.4 C)     99.1    Negative: [1] Fever > 101 F (38.3 C) AND [2] age > 60 years    Negative: [1] Fever > 100.0 F (37.8 C) AND [2] bedridden (e.g., nursing home patient, CVA, chronic illness, recovering from surgery)    Protocols used: CORONAVIRUS (COVID-19) DIAGNOSED OR [id number] 1.18.2022

## 2022-09-23 DIAGNOSIS — N52.9 ERECTILE DYSFUNCTION, UNSPECIFIED ERECTILE DYSFUNCTION TYPE: ICD-10-CM

## 2022-09-26 RX ORDER — SILDENAFIL CITRATE 20 MG/1
TABLET ORAL
OUTPATIENT
Start: 2022-09-26

## 2022-09-26 NOTE — TELEPHONE ENCOUNTER
Routing refill request to provider for review/approval because:  Please review at upcoming office visit (9/28/22)    Alisha Wiggins RN BSN  [facility]

## 2022-09-28 ENCOUNTER — OFFICE VISIT (OUTPATIENT)
Dept: FAMILY MEDICINE | Facility: CLINIC | Age: 57
End: 2022-09-28
Payer: COMMERCIAL

## 2022-09-28 VITALS
SYSTOLIC BLOOD PRESSURE: 125 MMHG | WEIGHT: 195.6 LBS | HEART RATE: 60 BPM | DIASTOLIC BLOOD PRESSURE: 79 MMHG | TEMPERATURE: 97.6 F | RESPIRATION RATE: 20 BRPM | BODY MASS INDEX: 30.7 KG/M2 | HEIGHT: 67 IN | OXYGEN SATURATION: 98 %

## 2022-09-28 DIAGNOSIS — J45.50 SEVERE PERSISTENT ASTHMA WITHOUT COMPLICATION (H): ICD-10-CM

## 2022-09-28 DIAGNOSIS — Z12.11 SCREEN FOR COLON CANCER: ICD-10-CM

## 2022-09-28 DIAGNOSIS — N52.9 ERECTILE DYSFUNCTION, UNSPECIFIED ERECTILE DYSFUNCTION TYPE: ICD-10-CM

## 2022-09-28 DIAGNOSIS — J33.9 SAMTER'S TRIAD: ICD-10-CM

## 2022-09-28 DIAGNOSIS — J33.9 NASAL POLYPOSIS: ICD-10-CM

## 2022-09-28 DIAGNOSIS — J32.4 CHRONIC PANSINUSITIS: ICD-10-CM

## 2022-09-28 DIAGNOSIS — Z88.6 SAMTER'S TRIAD: ICD-10-CM

## 2022-09-28 DIAGNOSIS — J45.909 SAMTER'S TRIAD: ICD-10-CM

## 2022-09-28 DIAGNOSIS — K42.9 UMBILICAL HERNIA WITHOUT OBSTRUCTION AND WITHOUT GANGRENE: Primary | ICD-10-CM

## 2022-09-28 PROCEDURE — 99214 OFFICE O/P EST MOD 30 MIN: CPT | Mod: 25 | Performed by: NURSE PRACTITIONER

## 2022-09-28 PROCEDURE — 90471 IMMUNIZATION ADMIN: CPT | Performed by: NURSE PRACTITIONER

## 2022-09-28 PROCEDURE — 90682 RIV4 VACC RECOMBINANT DNA IM: CPT | Performed by: NURSE PRACTITIONER

## 2022-09-28 RX ORDER — ALBUTEROL SULFATE 90 UG/1
2-4 AEROSOL, METERED RESPIRATORY (INHALATION) EVERY 4 HOURS PRN
Qty: 18 G | Refills: 3 | Status: SHIPPED | OUTPATIENT
Start: 2022-09-28 | End: 2023-09-25

## 2022-09-28 RX ORDER — SILDENAFIL CITRATE 20 MG/1
TABLET ORAL
Qty: 30 TABLET | Refills: 11 | Status: SHIPPED | OUTPATIENT
Start: 2022-09-28 | End: 2024-10-01

## 2022-09-28 RX ORDER — MONTELUKAST SODIUM 10 MG/1
10 TABLET ORAL AT BEDTIME
Qty: 90 TABLET | Refills: 3 | Status: SHIPPED | OUTPATIENT
Start: 2022-09-28 | End: 2024-02-13

## 2022-09-28 ASSESSMENT — ASTHMA QUESTIONNAIRES
QUESTION_3 LAST FOUR WEEKS HOW OFTEN DID YOUR ASTHMA SYMPTOMS (WHEEZING, COUGHING, SHORTNESS OF BREATH, CHEST TIGHTNESS OR PAIN) WAKE YOU UP AT NIGHT OR EARLIER THAN USUAL IN THE MORNING: NOT AT ALL
QUESTION_5 LAST FOUR WEEKS HOW WOULD YOU RATE YOUR ASTHMA CONTROL: SOMEWHAT CONTROLLED
ACT_TOTALSCORE: 21
ACT_TOTALSCORE: 21
QUESTION_4 LAST FOUR WEEKS HOW OFTEN HAVE YOU USED YOUR RESCUE INHALER OR NEBULIZER MEDICATION (SUCH AS ALBUTEROL): TWO OR THREE TIMES PER WEEK
QUESTION_2 LAST FOUR WEEKS HOW OFTEN HAVE YOU HAD SHORTNESS OF BREATH: NOT AT ALL
QUESTION_1 LAST FOUR WEEKS HOW MUCH OF THE TIME DID YOUR ASTHMA KEEP YOU FROM GETTING AS MUCH DONE AT WORK, SCHOOL OR AT HOME: NONE OF THE TIME

## 2022-09-28 ASSESSMENT — PAIN SCALES - GENERAL: PAINLEVEL: NO PAIN (0)

## 2022-09-28 NOTE — PATIENT INSTRUCTIONS
At [facility], we strive to deliver an exceptional experience to you, every time we see you. If you receive a survey, please complete it as we do value your feedback.  If you have MyChart, you can expect to receive results automatically within 24 hours of their completion.  Your provider will send a note interpreting your results as well.   If you do not have MyChart, you should receive your results in about a week by mail.    Your care team:                            Family Medicine Internal Medicine   MD Adriano Burton MD Shantel Branch-Fleming, MD Srinivasa Vaka, MD Katya Belousova, LARA Gomez CNP, MD ([facility]   Matt Wright, MD Ling Tan MD Amelia Massimini APRN CNP Kim Thein, APRN CNP Bethany Templen, MD             Clinic hours: Monday - Thursday 7 am-6 pm; Fridays 7 am-5 pm.   Urgent care: Monday - Friday 10 am- 8 pm; Saturday and Sunday 9 am-5 pm.    Clinic: [phone]       [facility]: Monday - Thursday 8 am - 7 pm; Friday 8 am - 6 pm  [facility]: [phone]

## 2022-09-28 NOTE — PROGRESS NOTES
"  Assessment & Plan     Umbilical hernia without obstruction and without gangrene  - Adult General Surg Referral; Future    Severe persistent asthma without complication  Refilled. ACT 21 today.  - albuterol (PROAIR HFA/PROVENTIL HFA/VENTOLIN HFA) 108 (90 Base) MCG/ACT inhaler; Inhale 2-4 puffs into the lungs every 4 hours as needed for shortness of breath / dyspnea, wheezing or other (persistent cough)  - fluticasone-vilanterol (BREO ELLIPTA) 200-25 MCG/INH inhaler; Inhale 1 puff into the lungs daily  - montelukast (SINGULAIR) 10 MG tablet; Take 1 tablet (10 mg) by mouth At Bedtime    Chronic pansinusitis  Refilled.  - montelukast (SINGULAIR) 10 MG tablet; Take 1 tablet (10 mg) by mouth At Bedtime    Nasal polyposis  Refilled.  - montelukast (SINGULAIR) 10 MG tablet; Take 1 tablet (10 mg) by mouth At Bedtime    Samter's triad  Refilled.  - montelukast (SINGULAIR) 10 MG tablet; Take 1 tablet (10 mg) by mouth At Bedtime    Erectile dysfunction, unspecified erectile dysfunction type  Refilled.   - sildenafil (REVATIO) 20 MG tablet; Take 1-5 tablets 30-60 min before intimacy    Screen for colon cancer  - Colonoscopy Screening  Referral; Future             BMI:   Estimated body mass index is 30.24 kg/m  as calculated from the following:    Height as of this encounter: 1.713 m (5' 7.44\").    Weight as of this encounter: 88.7 kg (195 lb 9.6 oz).   Weight management plan: Discussed healthy diet and exercise guidelines    See Patient Instructions    Return in about 6 months (around 3/28/2023), or if symptoms worsen or fail to improve.     The benefits, risks and potential side effects were discussed in detail. Black box warnings discussed as relevant. All patient questions were answered. The patient was instructed to follow up immediately if any adverse reactions develop.    Return precautions discussed, including when to seek urgent/emergent care.    Patient verbalizes understanding and agrees with plan of care. " "Patient stable for discharge.      LARA MARIN CNP  [facility] AC Lara is a 56 year old, presenting for the following health issues:  Hernia      History of Present Illness       Reason for visit:  Umbilical Hernia  Symptom onset:  More than a month  Symptoms include:  Getting bigger  Symptom intensity:  Mild  Symptom progression:  Worsening  Had these symptoms before:  Yes  Has tried/received treatment for these symptoms:  No  What makes it worse:  Lifting, doing heavy work,    He eats 2-3 servings of fruits and vegetables daily.He consumes 1 sweetened beverage(s) daily.He exercises with enough effort to increase his heart rate 10 to 19 minutes per day.  He exercises with enough effort to increase his heart rate 4 days per week.   He is taking medications regularly.     Hernia - present for a while. Getting bigger. Moved this summer and now has intermittent pain. If he bumps it it's more tender. Normal BMs.    Needs refills of medications. Doing well with current doses    Has previously seen an allergest. Needs refills for asthma           Review of Systems   Constitutional, HEENT, cardiovascular, pulmonary, GI, , musculoskeletal, neuro, skin, endocrine and psych systems are negative, except as otherwise noted.      Objective    /79 (BP Location: Left arm, Patient Position: Sitting, Cuff Size: Adult Large)   Pulse 60   Temp 97.6  F (36.4  C) (Oral)   Resp 20   Ht 1.713 m (5' 7.44\")   Wt 88.7 kg (195 lb 9.6 oz)   SpO2 98%   BMI 30.24 kg/m    Body mass index is 30.24 kg/m .  Physical Exam   GENERAL: healthy, alert and no distress  NECK: no adenopathy, no asymmetry, masses, or scars and thyroid normal to palpation  RESP: lungs clear to auscultation - no rales, rhonchi or wheezes  CV: regular rate and rhythm, normal S1 S2, no S3 or S4, no murmur, click or rub, no peripheral edema and peripheral pulses strong  ABDOMEN: soft, nontender, umbilical " hernia  MS: no gross musculoskeletal defects noted, no edema  PSYCH: mentation appears normal, affect normal/bright    No results found for any visits on 09/28/22.

## 2022-10-03 ENCOUNTER — HOSPITAL ENCOUNTER (OUTPATIENT)
Facility: AMBULATORY SURGERY CENTER | Age: 57
End: 2022-10-03
Attending: INTERNAL MEDICINE
Payer: COMMERCIAL

## 2022-10-03 ENCOUNTER — TELEPHONE (OUTPATIENT)
Dept: GASTROENTEROLOGY | Facility: CLINIC | Age: 57
End: 2022-10-03

## 2022-10-03 NOTE — TELEPHONE ENCOUNTER
Screening Questions    BlueKIND OF PREP RedLOCATION [review exclusion criteria] GreenSEDATION TYPE      Y 9/2 HOME Have you had a positive covid test in the last 90 days?   If yes, what date?        Y Are you able to give consent for your medical care?  (Sedation review/consideration needed)      Y Are you active on mychart?       [employer] What insurance is in the chart?        PHUC GRIFFIN Ordering/Referring Provider:        29.8 BMI [BMI OVER 40-EXTENDED PREP]  BMI OVER 40 NEED PAC EVALUATION FOR UPU     Respiratory Screening:  [If yes to any of the following HOSPITAL setting only]     N      Do you use daily home oxygen?   N      Do you have mod to severe Obstructive Sleep Apnea? Hospital only - Ok at [facility]   N      Do you have Pulmonary Hypertension? NEED PAC APPT AT U     N      Do you have UNCONTROLLED asthma?         N Have you had a heart or lung transplant?          N Are you currently on dialysis? [If yes, G-PREP & HOSPITAL setting only]        N  Do you have chronic kidney disease? [If yes, G-PREP]       N  Do you have a diagnosis of diabetes?[If yes, G-PREP]       N Have you had a stroke or Transient ischemic attack (TIA - aka  mini stroke ) within 6 months? (If yes, please review exclusion criteria)         N  In the past 6 months, have you had any heart related issues including cardiomyopathy or heart attack?          N  If yes, did it require cardiac stenting or other implantable device?          N Do you have any implantable devices in your body (pacemaker, defib, LVAD)? (If yes, please review exclusion criteria)       N Do you take nitroglycerin?          N If yes, how often?  (If yes, HOSPITAL setting ONLY)       N Are you currently taking any blood thinners?           [IF YES, INFORM PATIENT TO FOLLOW UP W/ ORDERING PROVIDER FOR BRIDGING INSTRUCTIONS]        N  Do you take Phentermine?      Yes-> Hold for 7 days before procedure.  Please consult your prescribing provider if you  have questions about holding this medication.       N Are you taking any prescription pain medications on a routine schedule? [EXTENDED PREP AND MAC]            [FEMALES] are you currently pregnant?                If yes, how many weeks? [Greater than 12 weeks, OR NEEDED]       N Any chemical dependencies such as alcohol, street drugs, or methadone? [If yes, MAC]       N Any history of post-traumatic stress syndrome, severe anxiety or history of psychosis? [If yes, MAC]       Y Can you transfer from bed to chair? (If NO, please HOSPITAL setting  only)        N  On a regular basis do you go 3-5 days between bowel movements?[ If yes, EXTENDED PREP.]        Preferred LOCAL Pharmacy for Pre Prescription:           [id number] - [place], [place] - [address]                            Scheduling Details       Caller:   (Please ask for phone number if not scheduled by patient)    Type of Procedure Scheduled: Lower Endoscopy [Colonoscopy]    GPREP Which Colonoscopy Prep was Sent:   EMERALD CF PATIENTS & GROEN'S PATIENTS NEEDS EXTENDED PREP    Date of Procedure: 11/28  Surgeon: RAMON  Location:     Sedation Type: CS    Conscious Sedation- Needs  for 6 hours after the procedure  MAC/General-Needs  for 24 hours after procedure    N Pre-op Required at [facility], [place], [place] and OR for MAC sedation:        (Advise patient they will need a pre-op WITH IN 30 DAYS prior to procedure -)      Y Informed patient they will need an adult          Cannot take any type of public or medical transportation alone    Y Confirmed Nurse will call to complete assessment     HOME Pre-Procedure Covid test to be completed [[facility] PCR Testing Required]       Additional comments:

## 2022-10-17 ENCOUNTER — OFFICE VISIT (OUTPATIENT)
Dept: SURGERY | Facility: CLINIC | Age: 57
End: 2022-10-17
Payer: COMMERCIAL

## 2022-10-17 ENCOUNTER — TELEPHONE (OUTPATIENT)
Dept: SURGERY | Facility: CLINIC | Age: 57
End: 2022-10-17

## 2022-10-17 VITALS
BODY MASS INDEX: 30.02 KG/M2 | SYSTOLIC BLOOD PRESSURE: 136 MMHG | DIASTOLIC BLOOD PRESSURE: 81 MMHG | HEIGHT: 68 IN | OXYGEN SATURATION: 99 % | WEIGHT: 198.1 LBS | HEART RATE: 61 BPM

## 2022-10-17 DIAGNOSIS — K42.9 UMBILICAL HERNIA WITHOUT OBSTRUCTION AND WITHOUT GANGRENE: ICD-10-CM

## 2022-10-17 PROCEDURE — 99203 OFFICE O/P NEW LOW 30 MIN: CPT | Performed by: SURGERY

## 2022-10-17 ASSESSMENT — PAIN SCALES - GENERAL: PAINLEVEL: NO PAIN (0)

## 2022-10-17 NOTE — PATIENT INSTRUCTIONS
Surgery Instructions    Always follow your surgeon s instructions. If you don t, your surgery could be cancelled. Please use the following checklist.  Your surgery is on: The surgery scheduler will contact you within 1 week of your consult with the surgeon. If you do not hear from them, please call the clinic or RN Care Coordinator for your provider.    Time: Prearrival times can vary depending on location/type of surgery.  [place] - 2 hour pre-arrival  [facility] [place] - 2 hour pre-arrival  [place] - 1 hour pre-arrival    Note:  These times may change. A nurse will call you before surgery to confirm. If you have not received a call or if you have more questions, please call us on the working day before your surgery:  [place]: [phone] or [phone] (9am to 5:30pm)  [facility] [place]: [phone] (8am to 6pm)  [place]: [phone] (9am to 5pm)  [facility] [phone] (7am to 4pm)  Prior to surgery  Have a pre-op physical exam with your Primary Doctor within 30 days of surgery  Ask your doctor to send all of your results to the surgery center/hospital before surgery. Your doctor also may ask you to bring the results with you on the day of surgery.  Tell your doctor if:  You are allergic to latex or rubber (latex and rubber gloves are often used in medical care).  You are taking any medicines (including aspirin), vitamins, or herbal products. You may need to stop taking some medicines before surgery.  You have any medical problems (allergies, diabetes, or heart disease, for example).  You have a pacemaker or an AICD (automatic implanted cardiac defibrillator). If you do, please bring the ID card with you on the day of surgery.  People who smoke have a higher risk of infection after surgery. Ask your doctor how you can quit smoking.  If you Primary Doctor is not within the [employer] system, you will need to have your pre-op physical faxed to us to be scanned into your chart.  [place]: [phone]  or [phone]  [facility] ([place]): [phone]  [facility]): [phone]  Complete pre-procedure COVID-19 Testing  What kind of COVID-19 test should I have?  At-home rapid antigen test  You must have a rapid-antigen test 1 to 2 days before your procedure (surgery) if you are:   - Over age 2 and   - Planning to go home the same day as your procedure (surgery)  Rapid antigen tests are not recommended for children age 2 and under. These patients should schedule a PCR test unless you have otherwise discussed performing an at-home antigen test with your surgeon.  You can buy this test at many pharmacy stores. Or, you may order a free test at covid.gov/tests.  If your test is negative: please take a photo of the test. Show the nurse the photo when you come in for your procedure. We can't accept rapid antigen tests that are more than 2 days old.  PCR lab test   You must have a PCR test in a lab within 4 days of your procedure (surgery) if you are:   - Age 2 or under (unless your surgeon gives you different instructions).   - Planning to stay overnight in the hospital   - Unable to find an at-home test.  While you don't have to use our lab, we recommend it. You can get your results more quickly and easily this way. To schedule a test at an [facility] lab, please call [phone]. Or, visit [email or web address].  If you have your test somewhere else, ask the testing location to fax your results to [phone].   If we don't get your results within 4 days of your procedure (surgery), we may have to delay or cancel your treatment.   We can't accept PCR tests that are more than 4 days old.  If your test shows you have COVID-19  If your test is positive, tell your surgeon's office right away. A positive test means that you have COVID-19.  We'll probably have to postpone your admission, surgery or procedure. Your care team will discuss this with you. We'll let you know  "what to do and when you can reschedule.  If your test shows you DON'T have COVID-19  A negative test result means you don't have COVID-19, but it is still possible that you might get it. It's rare, but sometimes the test result is wrong. Or, you could catch the virus after taking the test. There is also a very small chance that you could catch COVID-19 in the hospital or surgery center. [facility] has taken many steps to prevent this from happening.   To reduce your risk of getting COVID-19 before your procedure (surgery), follow the precautions below.  COVID-19 precautions  After your test and before your procedure, please follow these safety guidelines:  - Limit trips outside your home.  - Limit the number of people you see.  - Always wear a face covering outside your home.  - Use social distancing. Stay 6 feet away from others whenever you can.  - Wash your hands often.  Possible surgery delay   Like you, we want your surgery to happen when it's scheduled. But sometimes the hospital is so full that it's not safe for you to have your surgery. This is especially true during the pandemic. Your surgery may need to be rescheduled to a later date. If this happens, we will call and tell you.   Day of your surgery or procedure  Please come wearing a face covering that covers both your nose and mouth.  When you arrive, we'll ask you some questions to find out if you've had any exposures to COVID-19 or have any signs of COVID-19.   Ask your care team if you can have visitors. All visitors must wear face coverings and will be screened for exposure to, or signs of, COVID-19.   - The rules for visitors change often, depending on how much the virus is spreading. To learn more, see \"Visiting a Loved One in the Hospital during the COVID-19 Outbreak,\" at: www.[employer]/176875.pdf.   Call your insurance company. Ask if you need pre-approval for your surgery. If you do not have insurance, please let us know. If you wish to " speak to the , please alert the clinic staff so this can be arranged.  Arrange for someone to drive you home after surgery.  will need to be a responsible adult (18 years or older) that will provide transportation to and from surgery and stay in the waiting room during your surgery. You may not drive yourself or take public transportation to and from surgery.  Arrange for someone to stay with you for 24 hours after you go home. This person must be a responsible adult (18 years or older).  Call your surgeon or their nurse if there is any change in your health (cold, flu, infections, hospitalizations).  Do not smoke, drink alcohol, or take over-the-counter medicine for 24 hours before and after surgery.  If you take prescribed drugs, you may need to stop them until after the surgery.  Discuss what medications to take or not take prior to surgery with your Primary Doctor at your pre-op physical. Avoid over-the-counter blood-thinning medications such as Aspirin, Ibuprofen, vitamin E, or fish oil 7 days prior to surgery (unless otherwise directed by your Primary Doctor). Tylenol is a good alternative for mild pain relief prior to surgery.  Eating and drinking guidelines prior to surgery:  Stop all solid food consumption 8 hours prior to surgery  You may drink clear liquids up to 1 hour prior to surgery (water, fruit juices without pulp, jello, tea/coffee without creamer, sports drinks, clear-fat free broth (bouillon or consomme), popsicles (without milk, bits of fruit, or seeds/nuts)  Follow instructions given for showering or bathing before surgery.    Use 8 ounces of antiseptic surgical soap, like:  Hibiclens, Scrub Care, or Exidine  You can find it at your local pharmacy, clinic, or retail store. If you have trouble, ask your pharmacist to help you find the right substitute.  Please wash with one of the above soaps twice before coming to the hospital for your surgery. This will decrease bacteria  (germs) on your skin. It will also help reduce your chance of infection after surgery.  Items you will need for showerin newly washed washcloths  2 newly washed towels  8 ounces of one of the above soaps  Following these instructions:  The evening before surgery: Shower or bathe as you normally would, using your regular soap and a clean washcloth. Give special attention to places where your incision (surgical cut) or catheters will be. This includes your groin area. Rinse well. You may wash your hair with your regular shampoo. Next, wash your body with 4 ounces of the antiseptic soap. Use a clean, damp washcloth and gently clean your body (from the chin down). If your surgery involves your head, use the special soap on your head and scalp. Rinse well and dry off using a newly washed towel.  The morning of surgery: Repeat the same process as the evening shower.  Other suggestions:  Do not shave within 12 inches of your incision (surgical cut) area for at least 3 days before surgery. Shaving can make small cuts in the skin. This puts you at higher risk of infection.  Wear freshly washed pajamas or clothing after your evening shower.  Wear freshly washed clothes the day of surgery.  Wash and change your bed sheets the day before surgery to have clean bed sheets after your shower and when you get home from surgery.  If you have trouble washing all areas, make sure someone helps you.  Don't use any deodorant, lotion or powder after your shower.  Women who are menstruating should wear a fresh sanitary pad to the hospital.  Do not wear or add deodorant, cologne, lotion, makeup, nail polish or jewelry to surgery. If you wear fake nails, please remove at least one nail before coming to surgery (an oxygen monitor needs to be placed on your finger during surgery).  Bring these items to the surgery center/hospital:  Insurance card  Money for parking and co-pays, if needed  A list of all the medicines you take. Include  vitamins, minerals, herbs, and over-the-counter drugs.  Note any drug allergies.  A copy of your advance health care directive, if you have one. This tells us what treatment you would want--and who would make health care decisions--if you could no longer speak for yourself. You may request this form in advance or download it from [email or web address].pdf.  A case for glasses, contact lenses, hearing aids, or dentures.  Your inhaler or CPAP machine, if you use these at home.  Leave extra cash, jewelry, and other valuables at home.  When you arrive  When you get to the surgery center/hospital, you will:  Check in. If you are under age 18, you must be with a parent or legal guardian.  Sign consent forms, if you haven t already. These forms state that you know the risks and benefits of surgery. When you sign the forms, you give us permission to do the surgery. Do not sign them unless you understand what will happen during and after your surgery. If you have any questions about your surgery, ask to speak with your doctor before you sign the forms. If you don t understand the answers, ask again.  Receive a copy of the Patient s Bill of Rights. If you do not receive a copy, please ask for one.  Change into hospital clothes. Your belongings will be placed in a bag. We will return them to you after surgery.  Meet with the anesthesia provider. He or she will tell you what kind of anesthesia (medicine) will be used to keep you comfortable during surgery.  Remember: it s okay to remind doctors and nurses to wash their hands before touching you.  In most cases, your surgeon will use a marker to write his or her initials on the surgery site. This ensures that the exact site is operated on.  For safety reasons, we will ask you the same questions many times. For example, we may ask your name and birth date over and over again.  Friends and family can stay with you until it s time for surgery. While you re in surgery, they will be  in the waiting area. Please note that cell phones are not allowed in some patient care areas.  If you have questions about what will happen in the operating room, talk to your care team.  After surgery  We will move you to a recovery room, where we will watch you closely. If you have any pain or discomfort, tell your nurse. He or she will try to make you comfortable.  If you are staying overnight, we will move you to your hospital room after you are awake.  If you are going home, we will move you to another room. Friends and family may be able to join you. The length of time you spend in recovery depend on the type of medicine you received, your medical condition, the type of surgery you had, or your response to the anesthesia given during your procedure.  When you are discharged from the recovery room, the nurses will review instructions with you and your caregiver.  Please wash your hands every time you touch the wound or change bandages or dressings.  Do not submerge the wound in water.  You may not use a bathtub or hot tub until the wound is closed. The wait time frame is generally 2-3 weeks, but any open area can be a source of incoming bacteria, so it is better to be on the safe side and avoid water submersion until your wound is fully healed.  You may take a shower 24 hours after surgery. Double check with your surgeon if it is OK for water to run over the wound, whether it has been sutured, stapled, glued, or is open. You may gently wash the wound using the antiseptic soap provided for your pre-surgery showering (do not use a washcloth). Any mild soap will work as well.  Many surgical wounds will have small white strips of tape on them called steri-strips.  Do not remove these. The edges will curl and fall off within 7-10 days with normal showering.  If you are going home with sutures (stitches) or staples, you must return to the clinic to have them taken out, usually within 1-2 weeks. Some stitches are  dissolvable and do not require removal. Make sure to clarify with your surgeon or surgery nurse reviewing discharge paperwork what kind of sutures you have.  Signs and symptoms of infection include:  Fever, temperature over 101.5   F  Redness  Swelling  Increased pain  Green or yellow drainage which may or may not have a foul odor  Dealing with pain  A nurse will check your comfort level often during your stay. He or she will work with you to manage your pain.  Remember:  All pain is real. There are many ways to control pain. We can help you decide what works best for you.  Ask for pain medicine when you need it. Don t try to  tough it out --this can make you feel worse. Always take your medicine as ordered.  Medicine doesn t work the same for everyone. If your medicine isn t working, tell your nurse. There may be other medicines or treatments we can try.  Going home  We will let you know when you re ready to leave the surgery center or hospital. Before you leave, we will tell you how to care for yourself at home and prevent infections. If you do not understand something, please say so. We will answer any questions you have. We will then help you get ready to leave.  Remember, you must have a responsible adult (18 years or older) to stay with you 24 hours after you leave the hospital.  Take it easy when you get home. You will need some time to recover--you may be more tired than you realize at first. Rest and relax for at least the first 24 hours at home. You ll feel better and heal faster if you take good care of yourself.  Follow the discharge instructions that are given to you when you leave the surgery center or hospital  Please call the clinic if you experience any problems during regular clinic hours (Monday-Friday 8:00am-5:00pm).  If you experience problems during non-clinic hours, please call the [facility] on-call line at [phone] and ask the  to page the on-call Provider for your  specialty. The on-call Provider will call you back and can triage your symptoms and further advise. If you are having an emergency, always call 911 or seek immediate evaluation at the Emergency Room.  Locations  [facility]  Same-day surgery center - 2nd floor, check-in #5  [address] [place] [address]  [phone]  [email or web address] ([facility])  [address] [place] [address]  [phone]   [email or web address]    [facility]  [address] [place] [address]  [phone] (patient registration)  [phone] (main line)  [email or web address]  [facility]  [address] [place] [address]  [facility], 3rd floor for check-in  [phone] (patient registration)  [phone] (main line)  [email or web address]  [facility]  [address] [place] [address]  [phone]  [email or web address]  [facility]  [address] [place] [address]  [phone]  [email or web address]  [facility]  [address] [place] [address]  [phone]  [email or web address]  [facility]  [address]  [place] [address]  [phone]  [email or web address]  [facility]  [address] [place] [address]  [phone]  [email or web address]  [facility]  [address] [place] [address]  [phone]  [email or web address]

## 2022-10-17 NOTE — TELEPHONE ENCOUNTER
Case Request: HERNIORRHAPHY, UMBILICAL, OPEN [[id number]] (Order [id number])  Case Request  Date: 10/17/2022 Department: [facility] Ordering/Authorizing: Denise Espinoza MD     Order Information    Order Date/Time Release Date/Time Start Date/Time End Date/Time   10/17/22 04:41 PM None 10/17/2022 None     Order Details    Frequency Duration Priority Order Class   None None Routine Clinic Performed     Order Providers    Authorizing Provider Encounter Provider   Denise Espinoza MD Ramaswamy, Archana, MD       Ordering Provider's NPI: [id number]  Denise Espinoza    Case Request: HERNIORRHAPHY, UMBILICAL, OPEN [[id number]]    Electronically signed by: Denise Espinoza MD on 10/17/22 1641 Status: Completed   Ordering user: Denise Espinoza MD 10/17/22 1641     Order History  Outpatient  Date/Time Action Taken User Additional Information   10/17/22 1641 Sign Denise Espinoza MD    10/17/22 1641 Complete Denise Espinoza MD      Associated Diagnoses    Umbilical hernia without obstruction and without gangrene [K42.9]         Source Order Set    Order Set Name Order ID    [id number]     Case Request: Case Info    Panel 1    Providers    Provider Role Service   Denise Espinoza MD Primary      Procedures    Procedure Laterality Anesthesia Region   HERNIORRHAPHY, UMBILICAL, OPEN N/A MAC with Local                   Requested date:    Location: [facility] OR   Patient class:       Pre-op diagnoses:  Umbilical hernia without obstruction and without gangrene     Scheduling Instructions    4 week follow up phone, video, or in person

## 2022-10-17 NOTE — NURSING NOTE
"Marco Stovall's chief complaint for this visit includes:  Chief Complaint   Patient presents with     Consult     Umbilical hernia     PCP: [facility] - [facility]    Referring Provider:  LARA Appiah CNP  [address] [place] [address]    /81 (BP Location: Left arm, Patient Position: Sitting, Cuff Size: Adult Regular)   Pulse 61   Ht 1.727 m (5' 8\")   Wt 89.9 kg (198 lb 1.6 oz)   SpO2 99%   BMI 30.12 kg/m    No Pain (0)        Allergies   Allergen Reactions     Advil [Ibuprofen Micronized] Other (See Comments)     Tight chest     Asa [Aspirin] Nausea and Vomiting     Asthma - tight chest     Hazelnuts [Nuts] Other (See Comments)     Chest tight. Positive SPT. Tolerates other tree nuts and peanut.     Naprosyn [Naproxen] GI Disturbance     Stomach          Do you need any medication refills at today's visit? No    Rosemary Mathew CMA        "

## 2022-10-17 NOTE — LETTER
10/17/2022         RE: Marco Stovall  [address]  [place] [address]        Dear Colleague,    Thank you for referring your patient, Marco Stovall, to the [facility]. Please see a copy of my visit note below.    Chief Complaint: umbilical hernia    History of Present Illness:   57 year old male sent in consultation by Janice Ramirez for evaluation of an umbilical hernia. This has been present for about 10 years. He has noted however that it has increased slowly in size, and is starting to cause him discomfort. There is mild pain which is worsened with lifting and any direct pressure, and improved with rest. He notes that it is not reducible.       Past Medical History:   Diagnosis Date     Mild intermittent asthma without complication 3/24/2016     Polyp of nasal cavity      Past Surgical History:   Procedure Laterality Date     ENT SURGERY       ETHMOIDECTOMY  6/16/2014    Procedure: ETHMOIDECTOMY;  Surgeon: Jimbo Yang MD;  Location: [facility] OR     [facility] ENDOSCOPIC SINUS SURGERY Bilateral 12/3/2019    Procedure: Bilateral endoscopic maxillary antrostomies with tissue removal, bilateral endoscopic total ethmoidectomies, bilateral endoscopic sphenoidotomies, bilateral endoscopic frontal sinusotomies, bilateral endoscopic nasal polypectomy, image guidance;  Surgeon: Josh Swartz MD;  Location:  OR     SURGICAL HISTORY OF -   2010    Nasal Polyp; 3 total surgeries: 1997     Current Outpatient Medications   Medication     albuterol (PROAIR HFA/PROVENTIL HFA/VENTOLIN HFA) 108 (90 Base) MCG/ACT inhaler     fluticasone-vilanterol (BREO ELLIPTA) 200-25 MCG/INH inhaler     montelukast (SINGULAIR) 10 MG tablet     Multiple Vitamin (MULTIVITAMIN) per tablet     sildenafil (REVATIO) 20 MG tablet     No current facility-administered medications for this visit.        Allergies   Allergen Reactions     Advil [Ibuprofen Micronized] Other (See  Comments)     Tight chest     Asa [Aspirin] Nausea and Vomiting     Asthma - tight chest     Hazelnuts [Nuts] Other (See Comments)     Chest tight. Positive SPT. Tolerates other tree nuts and peanut.     Naprosyn [Naproxen] GI Disturbance     Stomach      Social History     Socioeconomic History     Marital status: Single     Spouse name: Not on file     Number of children: Not on file     Years of education: Not on file     Highest education level: Not on file   Occupational History     Not on file   Tobacco Use     Smoking status: Never     Smokeless tobacco: Never   Vaping Use     Vaping Use: Never used   Substance and Sexual Activity     Alcohol use: Yes     Drug use: No     Sexual activity: Yes     Partners: Female   Other Topics Concern     Parent/sibling w/ CABG, MI or angioplasty before 65F 55M? No      Service No     Blood Transfusions No     Caffeine Concern Yes     Comment: 2 cups a day     Occupational Exposure No     Hobby Hazards Yes     Comment: motorcycle     Sleep Concern No     Stress Concern No     Weight Concern No     Special Diet Yes     Comment: multi vit     Back Care No     Exercise No     Bike Helmet Yes     Seat Belt Yes     Self-Exams Not Asked   Social History Narrative    October 9, 2020    ENVIRONMENTAL HISTORY: [place] The [place] is heated with a forced air. They do have central air conditioning. The patient's bedroom is furnished with carpeting in bedroom and allergen mattress cover.  Pets inside the house include 2 cats. There is no history of cockroach or mice infestation. There are no smokers in the house.  The house does not have a damp basement.      Social Determinants of Health     Financial Resource Strain: Not on file   Food Insecurity: Not on file   Transportation Needs: Not on file   Physical Activity: Not on file   Stress: Not on file   Social Connections: Not on file   Intimate Partner Violence: Not on file  "  Housing Stability: Not on file     Family History   Problem Relation Age of Onset     Hypertension Mother      Thyroid Disease Mother         hypothyroidism     Hypertension Father      Diabetes Maternal Grandfather      Thyroid Disease Daughter         hypothyroidism     Asthma No family hx of      C.A.D. No family hx of      Cerebrovascular Disease No family hx of      Breast Cancer No family hx of      Cancer - colorectal No family hx of      Prostate Cancer No family hx of            Review of Systems:  No chest pain, dyspnea, weight loss, fevers or night sweats.   All other systems questioned and negative.     Exam:  Vital signs for exam: /81 (BP Location: Left arm, Patient Position: Sitting, Cuff Size: Adult Regular)   Pulse 61   Ht 1.727 m (5' 8\")   Wt 89.9 kg (198 lb 1.6 oz)   SpO2 99%   BMI 30.12 kg/m    Constitutional: healthy, alert and no distress.  Head: Normocephalic. No masses, lesions, tenderness or abnormalities.  ENT: ENT exam normal, no neck nodes or sinus tenderness.  Cardiovascular: Normal S1, S2, no murmurs  Respiratory: Clear to auscultation.   Gastrointestinal:  Abdomen soft, non-tender. Non reducible umbilical hernia, mild tenderness to palpation  : Deferred  Musculoskeletal: extremities normal- no gross deformities noted and normal muscle tone  Skin: no jaundice, rashes or petechiae.   Neurologic: Gait normal.  Psychiatric: Mentation appears normal and affect normal.    Laboratory Studies:    Latest CBC:  Lab Results   Component Value Date    WBC 8.4 11/18/2019     Lab Results   Component Value Date    RBC 4.82 11/18/2019     Lab Results   Component Value Date    HGB 14.9 11/18/2019     Lab Results   Component Value Date    HCT 43.9 11/18/2019     Lab Results   Component Value Date    MCV 91 11/18/2019     Lab Results   Component Value Date    MCH 30.9 11/18/2019     Lab Results   Component Value Date    MCHC 33.9 11/18/2019     Lab Results   Component Value Date    RDW 13.0 " 11/18/2019     Lab Results   Component Value Date     11/18/2019       Latest Basic Metabolic Panel:  Lab Results   Component Value Date     06/09/2014      Lab Results   Component Value Date    POTASSIUM 3.9 06/09/2014     Lab Results   Component Value Date    CHLORIDE 106 06/09/2014     Lab Results   Component Value Date    MARLON 9.1 06/09/2014     Lab Results   Component Value Date    CO2 28 06/09/2014     Lab Results   Component Value Date    BUN 14 06/09/2014     Lab Results   Component Value Date    CR 0.85 11/18/2019     Lab Results   Component Value Date     05/15/2017         IMPRESSION AND PLAN:  57 year old male with a symptomatic umbilical hernia. We discussed options, and he would like to proceed with an open umbilical hernia repair. Risks of the procedure including visceral injury, bleeding, infection, and recurrence explained to the patient who understands and is willing to proceed.            Again, thank you for allowing me to participate in the care of your patient.        Sincerely,        Denise Espinoza MD

## 2022-10-17 NOTE — PROGRESS NOTES
Chief Complaint: umbilical hernia    History of Present Illness:   57 year old male sent in consultation by Janice Ramirez for evaluation of an umbilical hernia. This has been present for about 10 years. He has noted however that it has increased slowly in size, and is starting to cause him discomfort. There is mild pain which is worsened with lifting and any direct pressure, and improved with rest. He notes that it is not reducible.       Past Medical History:   Diagnosis Date     Mild intermittent asthma without complication 3/24/2016     Polyp of nasal cavity      Past Surgical History:   Procedure Laterality Date     ENT SURGERY       ETHMOIDECTOMY  6/16/2014    Procedure: ETHMOIDECTOMY;  Surgeon: Jimbo Yang MD;  Location: [facility] OR     [facility] ENDOSCOPIC SINUS SURGERY Bilateral 12/3/2019    Procedure: Bilateral endoscopic maxillary antrostomies with tissue removal, bilateral endoscopic total ethmoidectomies, bilateral endoscopic sphenoidotomies, bilateral endoscopic frontal sinusotomies, bilateral endoscopic nasal polypectomy, image guidance;  Surgeon: Josh Swartz MD;  Location:  OR     SURGICAL HISTORY OF -   2010    Nasal Polyp; 3 total surgeries: 1997     Current Outpatient Medications   Medication     albuterol (PROAIR HFA/PROVENTIL HFA/VENTOLIN HFA) 108 (90 Base) MCG/ACT inhaler     fluticasone-vilanterol (BREO ELLIPTA) 200-25 MCG/INH inhaler     montelukast (SINGULAIR) 10 MG tablet     Multiple Vitamin (MULTIVITAMIN) per tablet     sildenafil (REVATIO) 20 MG tablet     No current facility-administered medications for this visit.        Allergies   Allergen Reactions     Advil [Ibuprofen Micronized] Other (See Comments)     Tight chest     Asa [Aspirin] Nausea and Vomiting     Asthma - tight chest     Hazelnuts [Nuts] Other (See Comments)     Chest tight. Positive SPT. Tolerates other tree nuts and peanut.     Naprosyn [Naproxen] GI Disturbance     Stomach      Social  History     Socioeconomic History     Marital status: Single     Spouse name: Not on file     Number of children: Not on file     Years of education: Not on file     Highest education level: Not on file   Occupational History     Not on file   Tobacco Use     Smoking status: Never     Smokeless tobacco: Never   Vaping Use     Vaping Use: Never used   Substance and Sexual Activity     Alcohol use: Yes     Drug use: No     Sexual activity: Yes     Partners: Female   Other Topics Concern     Parent/sibling w/ CABG, MI or angioplasty before 65F 55M? No      Service No     Blood Transfusions No     Caffeine Concern Yes     Comment: 2 cups a day     Occupational Exposure No     Hobby Hazards Yes     Comment: motorcycle     Sleep Concern No     Stress Concern No     Weight Concern No     Special Diet Yes     Comment: multi vit     Back Care No     Exercise No     Bike Helmet Yes     Seat Belt Yes     Self-Exams Not Asked   Social History Narrative    October 9, 2020    ENVIRONMENTAL HISTORY: [place] The home is heated with a forced air. They do have central air conditioning. The patient's bedroom is furnished with carpeting in bedroom and allergen mattress cover.  Pets inside the house include 2 cats. There is no history of cockroach or mice infestation. There are no smokers in the house.  The house does not have a damp basement.      Social Determinants of Health     Financial Resource Strain: Not on file   Food Insecurity: Not on file   Transportation Needs: Not on file   Physical Activity: Not on file   Stress: Not on file   Social Connections: Not on file   Intimate Partner Violence: Not on file   Housing Stability: Not on file     Family History   Problem Relation Age of Onset     Hypertension Mother      Thyroid Disease Mother         hypothyroidism     Hypertension Father      Diabetes Maternal Grandfather      Thyroid Disease Daughter         hypothyroidism  "    Asthma No family hx of      C.A.D. No family hx of      Cerebrovascular Disease No family hx of      Breast Cancer No family hx of      Cancer - colorectal No family hx of      Prostate Cancer No family hx of            Review of Systems:  No chest pain, dyspnea, weight loss, fevers or night sweats.   All other systems questioned and negative.     Exam:  Vital signs for exam: /81 (BP Location: Left arm, Patient Position: Sitting, Cuff Size: Adult Regular)   Pulse 61   Ht 1.727 m (5' 8\")   Wt 89.9 kg (198 lb 1.6 oz)   SpO2 99%   BMI 30.12 kg/m    Constitutional: healthy, alert and no distress.  Head: Normocephalic. No masses, lesions, tenderness or abnormalities.  ENT: ENT exam normal, no neck nodes or sinus tenderness.  Cardiovascular: Normal S1, S2, no murmurs  Respiratory: Clear to auscultation.   Gastrointestinal:  Abdomen soft, non-tender. Non reducible umbilical hernia, mild tenderness to palpation  : Deferred  Musculoskeletal: extremities normal- no gross deformities noted and normal muscle tone  Skin: no jaundice, rashes or petechiae.   Neurologic: Gait normal.  Psychiatric: Mentation appears normal and affect normal.    Laboratory Studies:    Latest CBC:  Lab Results   Component Value Date    WBC 8.4 11/18/2019     Lab Results   Component Value Date    RBC 4.82 11/18/2019     Lab Results   Component Value Date    HGB 14.9 11/18/2019     Lab Results   Component Value Date    HCT 43.9 11/18/2019     Lab Results   Component Value Date    MCV 91 11/18/2019     Lab Results   Component Value Date    MCH 30.9 11/18/2019     Lab Results   Component Value Date    MCHC 33.9 11/18/2019     Lab Results   Component Value Date    RDW 13.0 11/18/2019     Lab Results   Component Value Date     11/18/2019       Latest Basic Metabolic Panel:  Lab Results   Component Value Date     06/09/2014      Lab Results   Component Value Date    POTASSIUM 3.9 06/09/2014     Lab Results   Component Value Date "    CHLORIDE 106 06/09/2014     Lab Results   Component Value Date    MARLON 9.1 06/09/2014     Lab Results   Component Value Date    CO2 28 06/09/2014     Lab Results   Component Value Date    BUN 14 06/09/2014     Lab Results   Component Value Date    CR 0.85 11/18/2019     Lab Results   Component Value Date     05/15/2017         IMPRESSION AND PLAN:  57 year old male with a symptomatic umbilical hernia. We discussed options, and he would like to proceed with an open umbilical hernia repair. Risks of the procedure including visceral injury, bleeding, infection, and recurrence explained to the patient who understands and is willing to proceed.

## 2022-10-18 ENCOUNTER — MYC MEDICAL ADVICE (OUTPATIENT)
Dept: DERMATOLOGY | Facility: CLINIC | Age: 57
End: 2022-10-18

## 2022-10-18 PROBLEM — K42.9 UMBILICAL HERNIA WITHOUT OBSTRUCTION AND WITHOUT GANGRENE: Status: ACTIVE | Noted: 2022-10-18

## 2022-10-18 NOTE — TELEPHONE ENCOUNTER
Pt called, no answer. VM Id's pt. Left detailed message with reason for call and  for pt to call the [facility] back at [phone].. [employer] message sent as well. .Phone message sent to  who advised waiting 4 weeks post surgery before having Colonoscopy.Follow up [employer] message sent to pt .Helena Shah RN

## 2022-10-18 NOTE — TELEPHONE ENCOUNTER
Called patient to schedule surgery and he would like someone to follow up with him regarding how much time there is for recovery after surgery. He also would like to know if he can have his colonoscopy that is scheduled for 10/28. Will route to clinic to assist with questions.

## 2022-10-19 NOTE — TELEPHONE ENCOUNTER
Routing to surgery scheduler and Procedure scheduling pool to assist with scheduling surgery and rescheduling colonoscopy. Thank you..Helena Shah RN

## 2022-10-20 NOTE — TELEPHONE ENCOUNTER
10/20 Called and spoke to patient. Provided him with scheduling number [phone] to reschedule colonoscopy.     Sarah hernandez Procedure   Orthopedics, Podiatry, Sports Medicine, Ent ,Eye , Audiology, Adult Endocrine & Diabetes, Nutrition & Medication Therapy Management Specialties   [facility]

## 2022-10-20 NOTE — TELEPHONE ENCOUNTER
Scheduled surgery for 11/21 in [facility] with Dr. Espinoza.    H&P with PCP.    PT will take an at home COVID test 1-2 days before surgery and bring the results the day of surgery.     Post-op scheduled for 12/16.    PT has surg packet already.    No further questions/concerns at this time.

## 2022-11-09 ENCOUNTER — TELEPHONE (OUTPATIENT)
Dept: GASTROENTEROLOGY | Facility: CLINIC | Age: 57
End: 2022-11-09

## 2022-11-09 ENCOUNTER — OFFICE VISIT (OUTPATIENT)
Dept: FAMILY MEDICINE | Facility: CLINIC | Age: 57
End: 2022-11-09
Payer: COMMERCIAL

## 2022-11-09 VITALS
HEART RATE: 70 BPM | BODY MASS INDEX: 30.01 KG/M2 | OXYGEN SATURATION: 97 % | DIASTOLIC BLOOD PRESSURE: 78 MMHG | HEIGHT: 68 IN | WEIGHT: 198 LBS | TEMPERATURE: 98.8 F | SYSTOLIC BLOOD PRESSURE: 131 MMHG | RESPIRATION RATE: 14 BRPM

## 2022-11-09 DIAGNOSIS — J45.50 SEVERE PERSISTENT ASTHMA WITHOUT COMPLICATION (H): ICD-10-CM

## 2022-11-09 DIAGNOSIS — E78.5 HYPERLIPIDEMIA LDL GOAL <130: ICD-10-CM

## 2022-11-09 DIAGNOSIS — K42.9 UMBILICAL HERNIA WITHOUT OBSTRUCTION AND WITHOUT GANGRENE: ICD-10-CM

## 2022-11-09 DIAGNOSIS — Z01.818 PREOP GENERAL PHYSICAL EXAM: Primary | ICD-10-CM

## 2022-11-09 LAB
ERYTHROCYTE [DISTWIDTH] IN BLOOD BY AUTOMATED COUNT: 13.1 % (ref 10–15)
HCT VFR BLD AUTO: 44.6 % (ref 40–53)
HGB BLD-MCNC: 15 G/DL (ref 13.3–17.7)
INR PPP: 0.97 (ref 0.85–1.15)
MCH RBC QN AUTO: 30.4 PG (ref 26.5–33)
MCHC RBC AUTO-ENTMCNC: 33.6 G/DL (ref 31.5–36.5)
MCV RBC AUTO: 90 FL (ref 78–100)
PLATELET # BLD AUTO: 171 10E3/UL (ref 150–450)
RBC # BLD AUTO: 4.94 10E6/UL (ref 4.4–5.9)
WBC # BLD AUTO: 7.7 10E3/UL (ref 4–11)

## 2022-11-09 PROCEDURE — 80048 BASIC METABOLIC PNL TOTAL CA: CPT | Performed by: NURSE PRACTITIONER

## 2022-11-09 PROCEDURE — 85027 COMPLETE CBC AUTOMATED: CPT | Performed by: NURSE PRACTITIONER

## 2022-11-09 PROCEDURE — 36415 COLL VENOUS BLD VENIPUNCTURE: CPT | Performed by: NURSE PRACTITIONER

## 2022-11-09 PROCEDURE — 85610 PROTHROMBIN TIME: CPT | Performed by: NURSE PRACTITIONER

## 2022-11-09 PROCEDURE — 99214 OFFICE O/P EST MOD 30 MIN: CPT | Performed by: NURSE PRACTITIONER

## 2022-11-09 ASSESSMENT — PAIN SCALES - GENERAL: PAINLEVEL: NO PAIN (0)

## 2022-11-09 NOTE — PROGRESS NOTES
[facility] [address] [address]  Phone: [phone]  Primary Provider: Alber - [facility]  Pre-op Performing Provider: PHUC GRIFFIN      PREOPERATIVE EVALUATION:  Today's date: 11/9/2022    Marco Stovall is a 57 year old male who presents for a preoperative evaluation.    Surgical Information:  Surgery/Procedure:   Surgery Location:   Surgeon:   Surgery Date: 11/21/22  Time of Surgery: 10:25 am  Where patient plans to recover: At home with family  Fax number for surgical facility: Note does not need to be faxed, will be available electronically in Epic.    Type of Anesthesia Anticipated: MAC with local    Assessment & Plan     The proposed surgical procedure is considered INTERMEDIATE risk.    Preop general physical exam  - Basic metabolic panel  (Ca, Cl, CO2, Creat, Gluc, K, Na, BUN); Future  - CBC with platelets; Future  - INR; Future    Umbilical hernia without obstruction and without gangrene    Severe persistent asthma without complication  Controlled.    Hyperlipidemia LDL goal <130      Patient due for colon cancer screening. Had colonoscopy scheduled for the week after hernia surgery and was advised by his surgery to cancel that. He voiced frustration with getting through to someone on the phone to cancel the colonoscopy so our  called over to get it cancelled for him. I advised him to reschedule it once he's cleared by his surgeon to do so.         Risks and Recommendations:  The patient has the following additional risks and recommendations for perioperative complications:  Pulmonary:    - Incentive spirometry post-op    Medication Instructions:  Patient is to take all scheduled medications on the day of surgery    RECOMMENDATION:  APPROVAL GIVEN to proceed with proposed procedure, without further diagnostic evaluation.                Subjective     HPI related to upcoming procedure: Umbilical hernia  present for several years but slowly becoming larger and more painful. Not reducible.    Preop Questions 11/8/2022   1. Have you ever had a heart attack or stroke? No   2. Have you ever had surgery on your heart or blood vessels, such as a stent placement, a coronary artery bypass, or surgery on an artery in your head, neck, heart, or legs? No   3. Do you have chest pain with activity? No   4. Do you have a history of  heart failure? No   5. Do you currently have a cold, bronchitis or symptoms of other infection? No   6. Do you have a cough, shortness of breath, or wheezing? No   7. Do you or anyone in your family have previous history of blood clots? No   8. Do you or does anyone in your family have a serious bleeding problem such as prolonged bleeding following surgeries or cuts? No   9. Have you ever had problems with anemia or been told to take iron pills? No   10. Have you had any abnormal blood loss such as black, tarry or bloody stools? No   11. Have you ever had a blood transfusion? No   12. Are you willing to have a blood transfusion if it is medically needed before, during, or after your surgery? Yes   13. Have you or any of your relatives ever had problems with anesthesia? No   14. Do you have sleep apnea, excessive snoring or daytime drowsiness? No   15. Do you have any artifical heart valves or other implanted medical devices like a pacemaker, defibrillator, or continuous glucose monitor? No   16. Do you have artificial joints? No   17. Are you allergic to latex? No       Preoperative Review of :   reviewed - no record of controlled substances prescribed.      Status of Chronic Conditions:  ASTHMA - Patient has a longstanding history of moderate-severe Asthma . Patient has been doing well overall noting NO SYMPTOMS (occasionally gets wheezing in the evenings) and continues on medication regimen consisting of Breo Ellipta, Singulair and albuterol PRN without adverse reactions or side effects.        Review of Systems  Constitutional, neuro, ENT, endocrine, pulmonary, cardiac, gastrointestinal, genitourinary, musculoskeletal, integument and psychiatric systems are negative, except as otherwise noted.    Patient Active Problem List    Diagnosis Date Noted     Umbilical hernia without obstruction and without gangrene 10/18/2022     Priority: Medium     Added automatically from request for surgery [id number]       Chronic pansinusitis 11/06/2019     Priority: Medium     Added automatically from request for surgery [id number]       Nasal polyposis 11/06/2019     Priority: Medium     Added automatically from request for surgery [id number]       History of endoscopic sinus surgery 11/06/2019     Priority: Medium     Added automatically from request for surgery [id number]       Seasonal allergic rhinitis due to pollen 10/27/2018     Priority: Medium     Percutaneous skin puncture testing for aeroallergens performed on October 26, 2018 showed sensitivity to dust mite (Dermatophagoides pteronyssinus), grass pollen (Azeem grass and grass mix #7) tree pollen (Hackberry, and oak), weed pollen( ragweed mix, Russian thistle) and Alternaria mold.       Allergic rhinitis due to dust mite 10/27/2018     Priority: Medium     Aspirin sensitivity 10/26/2018     Priority: Medium     Samter's triad 09/14/2018     Priority: Medium     Nasal polyp 08/22/2018     Priority: Medium     Genital herpes simplex, unspecified site 08/22/2018     Priority: Medium     Severe persistent asthma without complication 03/24/2016     Priority: Medium     Hyperlipidemia LDL goal <130 07/20/2011     Priority: Medium     , July, 2011.        Chronic maxillary sinusitis 07/15/2011     Priority: Medium     Seen at [facility], and Dr. Yang. Surgery in 2010.        Screening for hypertension 10/16/2007     Priority: Medium      Past Medical History:   Diagnosis Date     Mild intermittent asthma without complication 3/24/2016     Polyp of  nasal cavity      Past Surgical History:   Procedure Laterality Date     ENT SURGERY       ETHMOIDECTOMY  6/16/2014    Procedure: ETHMOIDECTOMY;  Surgeon: Jimbo Yang MD;  Location: [facility] OR     OPTICAL TRACKING SYSTEM ENDOSCOPIC SINUS SURGERY Bilateral 12/3/2019    Procedure: Bilateral endoscopic maxillary antrostomies with tissue removal, bilateral endoscopic total ethmoidectomies, bilateral endoscopic sphenoidotomies, bilateral endoscopic frontal sinusotomies, bilateral endoscopic nasal polypectomy, image guidance;  Surgeon: Josh Swartz MD;  Location:  OR     SURGICAL HISTORY OF -   2010    Nasal Polyp; 3 total surgeries: 1997     Current Outpatient Medications   Medication Sig Dispense Refill     albuterol (PROAIR HFA/PROVENTIL HFA/VENTOLIN HFA) 108 (90 Base) MCG/ACT inhaler Inhale 2-4 puffs into the lungs every 4 hours as needed for shortness of breath / dyspnea, wheezing or other (persistent cough) 18 g 3     fluticasone-vilanterol (BREO ELLIPTA) 200-25 MCG/INH inhaler Inhale 1 puff into the lungs daily 60 each 5     montelukast (SINGULAIR) 10 MG tablet Take 1 tablet (10 mg) by mouth At Bedtime 90 tablet 3     Multiple Vitamin (MULTIVITAMIN) per tablet Take 1 tablet by mouth daily. 100 tablet 12     sildenafil (REVATIO) 20 MG tablet Take 1-5 tablets 30-60 min before intimacy 30 tablet 11       Allergies   Allergen Reactions     Advil [Ibuprofen Micronized] Other (See Comments)     Tight chest     Asa [Aspirin] Nausea and Vomiting     Asthma - tight chest     Hazelnuts [Nuts] Other (See Comments)     Chest tight. Positive SPT. Tolerates other tree nuts and peanut.     Naprosyn [Naproxen] GI Disturbance     Stomach         Social History     Tobacco Use     Smoking status: Never     Smokeless tobacco: Never   Substance Use Topics     Alcohol use: Yes     Family History   Problem Relation Age of Onset     Hypertension Mother      Thyroid Disease Mother         hypothyroidism     Hypertension Father   "    Diabetes Maternal Grandfather      Thyroid Disease Daughter         hypothyroidism     Asthma No family hx of      C.A.D. No family hx of      Cerebrovascular Disease No family hx of      Breast Cancer No family hx of      Cancer - colorectal No family hx of      Prostate Cancer No family hx of      History   Drug Use No         Objective     /78   Pulse 70   Temp 98.8  F (37.1  C) (Tympanic)   Resp 14   Ht 1.727 m (5' 8\")   Wt 89.8 kg (198 lb)   SpO2 97%   BMI 30.11 kg/m      Physical Exam    GENERAL APPEARANCE: healthy, alert and no distress     EYES: EOMI,  PERRL     HENT: ear canals and TM's normal and nose and mouth without ulcers or lesions     NECK: no adenopathy, no asymmetry, masses, or scars and thyroid normal to palpation     RESP: lungs clear to auscultation - no rales, rhonchi or wheezes     CV: regular rates and rhythm, normal S1 S2, no S3 or S4 and no murmur, click or rub     ABDOMEN:  soft, nontender, no HSM or masses and bowel sounds normal     MS: extremities normal- no gross deformities noted, no evidence of inflammation in joints, FROM in all extremities.     SKIN: no suspicious lesions or rashes     NEURO: Normal strength and tone, sensory exam grossly normal, mentation intact and speech normal     PSYCH: mentation appears normal. and affect normal/bright     LYMPHATICS: No cervical adenopathy    No results for input(s): HGB, PLT, INR, NA, POTASSIUM, CR, A1C in the last [id number] hours.     Diagnostics:  Recent Results (from the past 24 hour(s))   INR    Collection Time: 11/09/22  1:29 PM   Result Value Ref Range    INR 0.97 0.85 - 1.15   CBC with platelets    Collection Time: 11/09/22  1:29 PM   Result Value Ref Range    WBC Count 7.7 4.0 - 11.0 10e3/uL    RBC Count 4.94 4.40 - 5.90 10e6/uL    Hemoglobin 15.0 13.3 - 17.7 g/dL    Hematocrit 44.6 40.0 - 53.0 %    MCV 90 78 - 100 fL    MCH 30.4 26.5 - 33.0 pg    MCHC 33.6 31.5 - 36.5 g/dL    RDW 13.1 10.0 - 15.0 %    Platelet Count " 171 150 - 450 10e3/uL   Basic metabolic panel  (Ca, Cl, CO2, Creat, Gluc, K, Na, BUN)    Collection Time: 11/09/22  1:29 PM   Result Value Ref Range    Sodium 142 133 - 144 mmol/L    Potassium 4.1 3.4 - 5.3 mmol/L    Chloride 110 (H) 94 - 109 mmol/L    Carbon Dioxide (CO2) 29 20 - 32 mmol/L    Anion Gap 3 3 - 14 mmol/L    Urea Nitrogen 16 7 - 30 mg/dL    Creatinine 1.11 0.66 - 1.25 mg/dL    Calcium 8.4 (L) 8.5 - 10.1 mg/dL    Glucose 105 (H) 70 - 99 mg/dL    GFR Estimate 78 >60 mL/min/1.73m2      No EKG required, no history of coronary heart disease, significant arrhythmia, peripheral arterial disease or other structural heart disease.    Revised Cardiac Risk Index (RCRI):  The patient has the following serious cardiovascular risks for perioperative complications:   - No serious cardiac risks = 0 points     RCRI Interpretation: 0 points: Class I (very low risk - 0.4% complication rate)           Signed Electronically by: LARA MARIN CNP  Copy of this evaluation report is provided to requesting physician.

## 2022-11-09 NOTE — TELEPHONE ENCOUNTER
Caller: MARKET ([facility])    Procedure: COLONOSCOPY    Date, Location, and Surgeon of Procedure Cancelled: 11/28, RAMON OLIVARES    Ordering Provider:PHUC GRIFFIN    Reason for cancel (please be detailed, any staff messages or encounters to note?): Caller stated provider and patient decided appointment was not needed        Rescheduled: NO

## 2022-11-09 NOTE — PATIENT INSTRUCTIONS
At [facility], we strive to deliver an exceptional experience to you, every time we see you. If you receive a survey, please complete it as we do value your feedback.  If you have MyChart, you can expect to receive results automatically within 24 hours of their completion.  Your provider will send a note interpreting your results as well.   If you do not have MyChart, you should receive your results in about a week by mail.    Your care team:                            Family Medicine Internal Medicine   MD Adriano Burton MD Shantel Branch-Fleming, MD Srinivasa Vaka, MD Katya Belousova, LARA Gomez CNP, MD ([facility]   Matt Wright, MD Ling Tan MD Amelia Massimini APRN CNP   Julia Bowman, APRN MD Lissette Mazariegos MD          Clinic hours: Monday - Thursday 7 am-6 pm; Fridays 7 am-5 pm.   Urgent care: Monday - Friday 10 am- 8 pm; Saturday and Sunday 9 am-5 pm.    Clinic: [phone]       [facility]: Monday - Thursday 8 am - 7 pm; Friday 8 am - 6 pm  [facility]: [phone]     Preparing for Your Surgery  Getting started  A nurse will call you to review your health history and instructions. They will give you an arrival time based on your scheduled surgery time. Please be ready to share:  Your doctor s clinic name and phone number  Your medical, surgical, and anesthesia history  A list of allergies and sensitivities  A list of medicines, including herbal treatments and over-the-counter drugs  Whether the patient has a legal guardian (ask how to send us the papers in advance)  Please tell us if you re pregnant--or if there s any chance you might be pregnant. Some surgeries may injure a fetus (unborn baby), so they require a pregnancy test. Surgeries that are safe for a fetus don t always need a test, and you can  choose whether to have one.   If you have a child who s having surgery, please ask for a copy of Preparing for Your Child s Surgery.    Preparing for surgery  Within 10 to 30 days of surgery: Have a pre-op exam (sometimes called an H&P, or History and Physical). This can be done at a clinic or pre-operative center.  If you re having a , you may not need this exam. Talk to your care team.  At your pre-op exam, talk to your care team about all medicines you take. If you need to stop any medicines before surgery, ask when to start taking them again.  We do this for your safety. Many medicines can make you bleed too much during surgery. Some change how well surgery (anesthesia) drugs work.  Call your insurance company to let them know you re having surgery. (If you don t have insurance, call [phone].)  Call your clinic if there s any change in your health. This includes signs of a cold or flu (sore throat, runny nose, cough, rash, fever). It also includes a scrape or scratch near the surgery site.  If you have questions on the day of surgery, call your hospital or surgery center.  COVID testing  You may need to be tested for COVID-19 before having surgery. If so, we will give you instructions (or click here).  Eating and drinking guidelines  For your safety: Unless your surgeon tells you otherwise, follow the guidelines below.  Eat and drink as usual until 8 hours before you arrive for surgery. After that, no food or milk.  Drink clear liquids until 2 hours before you arrive. These are liquids you can see through, like water, Gatorade, and Propel Water. They also include plain black coffee and tea (no cream or milk), candy, and breath mints. You can spit out gum when you arrive.  If you drink alcohol: Stop drinking it the night before surgery.  If your care team tells you to take medicine on the morning of surgery, it s okay to take it with a sip of water.  Preventing infection  Shower or bathe the night  before and morning of your surgery. Follow the instructions your clinic gave you. (If no instructions, use regular soap.)  Don t shave or clip hair near your surgery site. We ll remove the hair if needed.  Don t smoke or vape the morning of surgery. You may chew nicotine gum up to 2 hours before surgery. A nicotine patch is okay.  Note: Some surgeries require you to completely quit smoking and nicotine. Check with your surgeon.  Your care team will make every effort to keep you safe from infection. We will:  Clean our hands often with soap and water (or an alcohol-based hand rub).  Clean the skin at your surgery site with a special soap that kills germs.  Give you a special gown to keep you warm. (Cold raises the risk of infection.)  Wear special hair covers, masks, gowns and gloves during surgery.  Give antibiotic medicine, if prescribed. Not all surgeries need antibiotics.  What to bring on the day of surgery  Photo ID and insurance card  Copy of your health care directive, if you have one  Glasses and hearing aids (bring cases)  You can t wear contacts during surgery  Inhaler and eye drops, if you use them (tell us about these when you arrive)  CPAP machine or breathing device, if you use them  A few personal items, if spending the night  If you have . . .  A pacemaker, ICD (cardiac defibrillator) or other implant: Bring the ID card.  An implanted stimulator: Bring the remote control.  A legal guardian: Bring a copy of the certified (court-stamped) guardianship papers.  Please remove any jewelry, including body piercings. Leave jewelry and other valuables at home.  If you re going home the day of surgery  You must have a responsible adult drive you home. They should stay with you overnight as well.  If you don t have someone to stay with you, and you aren t safe to go home alone, we may keep you overnight. Insurance often won t pay for this.  After surgery  If it s hard to control your pain or you need more pain  medicine, please call your surgeon s office.  Questions?   If you have any questions for your care team, list them here:   ____________________________________________________________________________________________________________________________________________________________________________________________________________________________________________________________________  For informational purposes only. Not to replace the advice of your health care provider. Copyright   2003, 2019 [facility]. All rights reserved. Clinically reviewed by Sravanthi Quiñones MD. [employer] [id number] - REV 10/22.

## 2022-11-09 NOTE — H&P (VIEW-ONLY)
[facility] [address] [address]  Phone: [phone]  Primary Provider: [facility]  Pre-op Performing Provider: PHUC GRIFFIN      PREOPERATIVE EVALUATION:  Today's date: 11/9/2022    Marco Stovall is a 57 year old male who presents for a preoperative evaluation.    Surgical Information:  Surgery/Procedure:   Surgery Location:   Surgeon:   Surgery Date: 11/21/22  Time of Surgery: 10:25 am  Where patient plans to recover: At home with family  Fax number for surgical facility: Note does not need to be faxed, will be available electronically in Epic.    Type of Anesthesia Anticipated: MAC with local    Assessment & Plan     The proposed surgical procedure is considered INTERMEDIATE risk.    Preop general physical exam  - Basic metabolic panel  (Ca, Cl, CO2, Creat, Gluc, K, Na, BUN); Future  - CBC with platelets; Future  - INR; Future    Umbilical hernia without obstruction and without gangrene    Severe persistent asthma without complication  Controlled.    Hyperlipidemia LDL goal <130      Patient due for colon cancer screening. Had colonoscopy scheduled for the week after hernia surgery and was advised by his surgery to cancel that. He voiced frustration with getting through to someone on the phone to cancel the colonoscopy so our  called over to get it cancelled for him. I advised him to reschedule it once he's cleared by his surgeon to do so.         Risks and Recommendations:  The patient has the following additional risks and recommendations for perioperative complications:  Pulmonary:    - Incentive spirometry post-op    Medication Instructions:  Patient is to take all scheduled medications on the day of surgery    RECOMMENDATION:  APPROVAL GIVEN to proceed with proposed procedure, without further diagnostic evaluation.                Subjective     HPI related to upcoming procedure: Umbilical hernia  present for several years but slowly becoming larger and more painful. Not reducible.    Preop Questions 11/8/2022   1. Have you ever had a heart attack or stroke? No   2. Have you ever had surgery on your heart or blood vessels, such as a stent placement, a coronary artery bypass, or surgery on an artery in your head, neck, heart, or legs? No   3. Do you have chest pain with activity? No   4. Do you have a history of  heart failure? No   5. Do you currently have a cold, bronchitis or symptoms of other infection? No   6. Do you have a cough, shortness of breath, or wheezing? No   7. Do you or anyone in your family have previous history of blood clots? No   8. Do you or does anyone in your family have a serious bleeding problem such as prolonged bleeding following surgeries or cuts? No   9. Have you ever had problems with anemia or been told to take iron pills? No   10. Have you had any abnormal blood loss such as black, tarry or bloody stools? No   11. Have you ever had a blood transfusion? No   12. Are you willing to have a blood transfusion if it is medically needed before, during, or after your surgery? Yes   13. Have you or any of your relatives ever had problems with anesthesia? No   14. Do you have sleep apnea, excessive snoring or daytime drowsiness? No   15. Do you have any artifical heart valves or other implanted medical devices like a pacemaker, defibrillator, or continuous glucose monitor? No   16. Do you have artificial joints? No   17. Are you allergic to latex? No       Preoperative Review of :   reviewed - no record of controlled substances prescribed.      Status of Chronic Conditions:  ASTHMA - Patient has a longstanding history of moderate-severe Asthma . Patient has been doing well overall noting NO SYMPTOMS (occasionally gets wheezing in the evenings) and continues on medication regimen consisting of Breo Ellipta, Singulair and albuterol PRN without adverse reactions or side effects.        Review of Systems  Constitutional, neuro, ENT, endocrine, pulmonary, cardiac, gastrointestinal, genitourinary, musculoskeletal, integument and psychiatric systems are negative, except as otherwise noted.    Patient Active Problem List    Diagnosis Date Noted     Umbilical hernia without obstruction and without gangrene 10/18/2022     Priority: Medium     Added automatically from request for surgery [id number]       Chronic pansinusitis 11/06/2019     Priority: Medium     Added automatically from request for surgery [id number]       Nasal polyposis 11/06/2019     Priority: Medium     Added automatically from request for surgery [id number]       History of endoscopic sinus surgery 11/06/2019     Priority: Medium     Added automatically from request for surgery [id number]       Seasonal allergic rhinitis due to pollen 10/27/2018     Priority: Medium     Percutaneous skin puncture testing for aeroallergens performed on October 26, 2018 showed sensitivity to dust mite (Dermatophagoides pteronyssinus), grass pollen (Azeem grass and grass mix #7) tree pollen (Hackberry, and oak), weed pollen( ragweed mix, Russian thistle) and Alternaria mold.       Allergic rhinitis due to dust mite 10/27/2018     Priority: Medium     Aspirin sensitivity 10/26/2018     Priority: Medium     Samter's triad 09/14/2018     Priority: Medium     Nasal polyp 08/22/2018     Priority: Medium     Genital herpes simplex, unspecified site 08/22/2018     Priority: Medium     Severe persistent asthma without complication 03/24/2016     Priority: Medium     Hyperlipidemia LDL goal <130 07/20/2011     Priority: Medium     , July, 2011.        Chronic maxillary sinusitis 07/15/2011     Priority: Medium     Seen at [facility], and Dr. aYng. Surgery in 2010.        Screening for hypertension 10/16/2007     Priority: Medium      Past Medical History:   Diagnosis Date     Mild intermittent asthma without complication 3/24/2016     Polyp of  nasal cavity      Past Surgical History:   Procedure Laterality Date     ENT SURGERY       ETHMOIDECTOMY  6/16/2014    Procedure: ETHMOIDECTOMY;  Surgeon: Jimbo Yang MD;  Location: [facility] OR     OPTICAL TRACKING SYSTEM ENDOSCOPIC SINUS SURGERY Bilateral 12/3/2019    Procedure: Bilateral endoscopic maxillary antrostomies with tissue removal, bilateral endoscopic total ethmoidectomies, bilateral endoscopic sphenoidotomies, bilateral endoscopic frontal sinusotomies, bilateral endoscopic nasal polypectomy, image guidance;  Surgeon: Josh Swartz MD;  Location:  OR     SURGICAL HISTORY OF -   2010    Nasal Polyp; 3 total surgeries: 1997     Current Outpatient Medications   Medication Sig Dispense Refill     albuterol (PROAIR HFA/PROVENTIL HFA/VENTOLIN HFA) 108 (90 Base) MCG/ACT inhaler Inhale 2-4 puffs into the lungs every 4 hours as needed for shortness of breath / dyspnea, wheezing or other (persistent cough) 18 g 3     fluticasone-vilanterol (BREO ELLIPTA) 200-25 MCG/INH inhaler Inhale 1 puff into the lungs daily 60 each 5     montelukast (SINGULAIR) 10 MG tablet Take 1 tablet (10 mg) by mouth At Bedtime 90 tablet 3     Multiple Vitamin (MULTIVITAMIN) per tablet Take 1 tablet by mouth daily. 100 tablet 12     sildenafil (REVATIO) 20 MG tablet Take 1-5 tablets 30-60 min before intimacy 30 tablet 11       Allergies   Allergen Reactions     Advil [Ibuprofen Micronized] Other (See Comments)     Tight chest     Asa [Aspirin] Nausea and Vomiting     Asthma - tight chest     Hazelnuts [Nuts] Other (See Comments)     Chest tight. Positive SPT. Tolerates other tree nuts and peanut.     Naprosyn [Naproxen] GI Disturbance     Stomach         Social History     Tobacco Use     Smoking status: Never     Smokeless tobacco: Never   Substance Use Topics     Alcohol use: Yes     Family History   Problem Relation Age of Onset     Hypertension Mother      Thyroid Disease Mother         hypothyroidism     Hypertension Father   "    Diabetes Maternal Grandfather      Thyroid Disease Daughter         hypothyroidism     Asthma No family hx of      C.A.D. No family hx of      Cerebrovascular Disease No family hx of      Breast Cancer No family hx of      Cancer - colorectal No family hx of      Prostate Cancer No family hx of      History   Drug Use No         Objective     /78   Pulse 70   Temp 98.8  F (37.1  C) (Tympanic)   Resp 14   Ht 1.727 m (5' 8\")   Wt 89.8 kg (198 lb)   SpO2 97%   BMI 30.11 kg/m      Physical Exam    GENERAL APPEARANCE: healthy, alert and no distress     EYES: EOMI,  PERRL     HENT: ear canals and TM's normal and nose and mouth without ulcers or lesions     NECK: no adenopathy, no asymmetry, masses, or scars and thyroid normal to palpation     RESP: lungs clear to auscultation - no rales, rhonchi or wheezes     CV: regular rates and rhythm, normal S1 S2, no S3 or S4 and no murmur, click or rub     ABDOMEN:  soft, nontender, no HSM or masses and bowel sounds normal     MS: extremities normal- no gross deformities noted, no evidence of inflammation in joints, FROM in all extremities.     SKIN: no suspicious lesions or rashes     NEURO: Normal strength and tone, sensory exam grossly normal, mentation intact and speech normal     PSYCH: mentation appears normal. and affect normal/bright     LYMPHATICS: No cervical adenopathy    No results for input(s): HGB, PLT, INR, NA, POTASSIUM, CR, A1C in the last 74850 hours.     Diagnostics:  Recent Results (from the past 24 hour(s))   INR    Collection Time: 11/09/22  1:29 PM   Result Value Ref Range    INR 0.97 0.85 - 1.15   CBC with platelets    Collection Time: 11/09/22  1:29 PM   Result Value Ref Range    WBC Count 7.7 4.0 - 11.0 10e3/uL    RBC Count 4.94 4.40 - 5.90 10e6/uL    Hemoglobin 15.0 13.3 - 17.7 g/dL    Hematocrit 44.6 40.0 - 53.0 %    MCV 90 78 - 100 fL    MCH 30.4 26.5 - 33.0 pg    MCHC 33.6 31.5 - 36.5 g/dL    RDW 13.1 10.0 - 15.0 %    Platelet Count " 171 150 - 450 10e3/uL   Basic metabolic panel  (Ca, Cl, CO2, Creat, Gluc, K, Na, BUN)    Collection Time: 11/09/22  1:29 PM   Result Value Ref Range    Sodium 142 133 - 144 mmol/L    Potassium 4.1 3.4 - 5.3 mmol/L    Chloride 110 (H) 94 - 109 mmol/L    Carbon Dioxide (CO2) 29 20 - 32 mmol/L    Anion Gap 3 3 - 14 mmol/L    Urea Nitrogen 16 7 - 30 mg/dL    Creatinine 1.11 0.66 - 1.25 mg/dL    Calcium 8.4 (L) 8.5 - 10.1 mg/dL    Glucose 105 (H) 70 - 99 mg/dL    GFR Estimate 78 >60 mL/min/1.73m2      No EKG required, no history of coronary heart disease, significant arrhythmia, peripheral arterial disease or other structural heart disease.    Revised Cardiac Risk Index (RCRI):  The patient has the following serious cardiovascular risks for perioperative complications:   - No serious cardiac risks = 0 points     RCRI Interpretation: 0 points: Class I (very low risk - 0.4% complication rate)           Signed Electronically by: LARA MARIN CNP  Copy of this evaluation report is provided to requesting physician.

## 2022-11-10 LAB
ANION GAP SERPL CALCULATED.3IONS-SCNC: 3 MMOL/L (ref 3–14)
BUN SERPL-MCNC: 16 MG/DL (ref 7–30)
CALCIUM SERPL-MCNC: 8.4 MG/DL (ref 8.5–10.1)
CHLORIDE BLD-SCNC: 110 MMOL/L (ref 94–109)
CO2 SERPL-SCNC: 29 MMOL/L (ref 20–32)
CREAT SERPL-MCNC: 1.11 MG/DL (ref 0.66–1.25)
GFR SERPL CREATININE-BSD FRML MDRD: 78 ML/MIN/1.73M2
GLUCOSE BLD-MCNC: 105 MG/DL (ref 70–99)
POTASSIUM BLD-SCNC: 4.1 MMOL/L (ref 3.4–5.3)
SODIUM SERPL-SCNC: 142 MMOL/L (ref 133–144)

## 2022-11-18 ENCOUNTER — ANESTHESIA EVENT (OUTPATIENT)
Dept: SURGERY | Facility: AMBULATORY SURGERY CENTER | Age: 57
End: 2022-11-18
Payer: COMMERCIAL

## 2022-11-21 ENCOUNTER — ANESTHESIA (OUTPATIENT)
Dept: SURGERY | Facility: AMBULATORY SURGERY CENTER | Age: 57
End: 2022-11-21
Payer: COMMERCIAL

## 2022-11-21 ENCOUNTER — TELEPHONE (OUTPATIENT)
Dept: SURGERY | Facility: CLINIC | Age: 57
End: 2022-11-21

## 2022-11-21 ENCOUNTER — HOSPITAL ENCOUNTER (OUTPATIENT)
Facility: AMBULATORY SURGERY CENTER | Age: 57
Discharge: HOME | End: 2022-11-21
Attending: SURGERY | Admitting: SURGERY
Payer: COMMERCIAL

## 2022-11-21 VITALS
TEMPERATURE: 96.3 F | SYSTOLIC BLOOD PRESSURE: 119 MMHG | OXYGEN SATURATION: 94 % | RESPIRATION RATE: 16 BRPM | DIASTOLIC BLOOD PRESSURE: 79 MMHG

## 2022-11-21 DIAGNOSIS — K42.9 UMBILICAL HERNIA WITHOUT OBSTRUCTION AND WITHOUT GANGRENE: ICD-10-CM

## 2022-11-21 PROCEDURE — 49587 PR REPAIR UMBILICAL HERN,5+Y/O,STRANG: CPT | Performed by: SURGERY

## 2022-11-21 PROCEDURE — G8916 PT W IV AB GIVEN ON TIME: HCPCS

## 2022-11-21 PROCEDURE — G8907 PT DOC NO EVENTS ON DISCHARG: HCPCS

## 2022-11-21 PROCEDURE — 49587: CPT

## 2022-11-21 RX ORDER — FENTANYL CITRATE 50 UG/ML
INJECTION, SOLUTION INTRAMUSCULAR; INTRAVENOUS PRN
Status: DISCONTINUED | OUTPATIENT
Start: 2022-11-21 | End: 2022-11-21

## 2022-11-21 RX ORDER — ACETAMINOPHEN 325 MG/1
975 TABLET ORAL ONCE
Status: DISCONTINUED | OUTPATIENT
Start: 2022-11-21 | End: 2022-11-22 | Stop reason: HOSPADM

## 2022-11-21 RX ORDER — CEFAZOLIN SODIUM 2 G/100ML
2 INJECTION, SOLUTION INTRAVENOUS SEE ADMIN INSTRUCTIONS
Status: DISCONTINUED | OUTPATIENT
Start: 2022-11-21 | End: 2022-11-22 | Stop reason: HOSPADM

## 2022-11-21 RX ORDER — METOPROLOL TARTRATE 1 MG/ML
1-2 INJECTION, SOLUTION INTRAVENOUS EVERY 5 MIN PRN
Status: DISCONTINUED | OUTPATIENT
Start: 2022-11-21 | End: 2022-11-22 | Stop reason: HOSPADM

## 2022-11-21 RX ORDER — BUPIVACAINE HYDROCHLORIDE AND EPINEPHRINE 2.5; 5 MG/ML; UG/ML
INJECTION, SOLUTION INFILTRATION; PERINEURAL PRN
Status: DISCONTINUED | OUTPATIENT
Start: 2022-11-21 | End: 2022-11-21 | Stop reason: HOSPADM

## 2022-11-21 RX ORDER — SODIUM CHLORIDE, SODIUM LACTATE, POTASSIUM CHLORIDE, CALCIUM CHLORIDE 600; 310; 30; 20 MG/100ML; MG/100ML; MG/100ML; MG/100ML
INJECTION, SOLUTION INTRAVENOUS CONTINUOUS
Status: DISCONTINUED | OUTPATIENT
Start: 2022-11-21 | End: 2022-11-22 | Stop reason: HOSPADM

## 2022-11-21 RX ORDER — CEFAZOLIN SODIUM 2 G/100ML
2 INJECTION, SOLUTION INTRAVENOUS
Status: COMPLETED | OUTPATIENT
Start: 2022-11-21 | End: 2022-11-21

## 2022-11-21 RX ORDER — FENTANYL CITRATE 50 UG/ML
25 INJECTION, SOLUTION INTRAMUSCULAR; INTRAVENOUS EVERY 5 MIN PRN
Status: DISCONTINUED | OUTPATIENT
Start: 2022-11-21 | End: 2022-11-22 | Stop reason: HOSPADM

## 2022-11-21 RX ORDER — LIDOCAINE HYDROCHLORIDE 20 MG/ML
INJECTION, SOLUTION INFILTRATION; PERINEURAL PRN
Status: DISCONTINUED | OUTPATIENT
Start: 2022-11-21 | End: 2022-11-21

## 2022-11-21 RX ORDER — ONDANSETRON 2 MG/ML
4 INJECTION INTRAMUSCULAR; INTRAVENOUS EVERY 30 MIN PRN
Status: DISCONTINUED | OUTPATIENT
Start: 2022-11-21 | End: 2022-11-22 | Stop reason: HOSPADM

## 2022-11-21 RX ORDER — FENTANYL CITRATE 50 UG/ML
25 INJECTION, SOLUTION INTRAMUSCULAR; INTRAVENOUS
Status: DISCONTINUED | OUTPATIENT
Start: 2022-11-21 | End: 2022-11-22 | Stop reason: HOSPADM

## 2022-11-21 RX ORDER — OXYCODONE HYDROCHLORIDE 5 MG/1
5 TABLET ORAL
Status: CANCELLED | OUTPATIENT
Start: 2022-11-21

## 2022-11-21 RX ORDER — FENTANYL CITRATE 50 UG/ML
50 INJECTION, SOLUTION INTRAMUSCULAR; INTRAVENOUS EVERY 5 MIN PRN
Status: DISCONTINUED | OUTPATIENT
Start: 2022-11-21 | End: 2022-11-22 | Stop reason: HOSPADM

## 2022-11-21 RX ORDER — ONDANSETRON 4 MG/1
4 TABLET, ORALLY DISINTEGRATING ORAL EVERY 30 MIN PRN
Status: DISCONTINUED | OUTPATIENT
Start: 2022-11-21 | End: 2022-11-22 | Stop reason: HOSPADM

## 2022-11-21 RX ORDER — OXYCODONE HYDROCHLORIDE 5 MG/1
5 TABLET ORAL EVERY 4 HOURS PRN
Status: DISCONTINUED | OUTPATIENT
Start: 2022-11-21 | End: 2022-11-22 | Stop reason: HOSPADM

## 2022-11-21 RX ORDER — PROPOFOL 10 MG/ML
INJECTION, EMULSION INTRAVENOUS CONTINUOUS PRN
Status: DISCONTINUED | OUTPATIENT
Start: 2022-11-21 | End: 2022-11-21

## 2022-11-21 RX ORDER — OXYCODONE HYDROCHLORIDE 5 MG/1
10 TABLET ORAL EVERY 4 HOURS PRN
Status: DISCONTINUED | OUTPATIENT
Start: 2022-11-21 | End: 2022-11-22 | Stop reason: HOSPADM

## 2022-11-21 RX ORDER — HYDRALAZINE HYDROCHLORIDE 20 MG/ML
2.5-5 INJECTION INTRAMUSCULAR; INTRAVENOUS EVERY 10 MIN PRN
Status: DISCONTINUED | OUTPATIENT
Start: 2022-11-21 | End: 2022-11-22 | Stop reason: HOSPADM

## 2022-11-21 RX ORDER — PROPOFOL 10 MG/ML
INJECTION, EMULSION INTRAVENOUS PRN
Status: DISCONTINUED | OUTPATIENT
Start: 2022-11-21 | End: 2022-11-21

## 2022-11-21 RX ORDER — LIDOCAINE 40 MG/G
CREAM TOPICAL
Status: DISCONTINUED | OUTPATIENT
Start: 2022-11-21 | End: 2022-11-22 | Stop reason: HOSPADM

## 2022-11-21 RX ADMIN — LIDOCAINE HYDROCHLORIDE 100 MG: 20 INJECTION, SOLUTION INFILTRATION; PERINEURAL at 10:20

## 2022-11-21 RX ADMIN — CEFAZOLIN SODIUM 2 G: 2 INJECTION, SOLUTION INTRAVENOUS at 10:16

## 2022-11-21 RX ADMIN — FENTANYL CITRATE 50 MCG: 50 INJECTION, SOLUTION INTRAMUSCULAR; INTRAVENOUS at 10:18

## 2022-11-21 RX ADMIN — PROPOFOL 30 MG: 10 INJECTION, EMULSION INTRAVENOUS at 10:22

## 2022-11-21 RX ADMIN — PROPOFOL 150 MCG/KG/MIN: 10 INJECTION, EMULSION INTRAVENOUS at 10:22

## 2022-11-21 RX ADMIN — SODIUM CHLORIDE, SODIUM LACTATE, POTASSIUM CHLORIDE, CALCIUM CHLORIDE: 600; 310; 30; 20 INJECTION, SOLUTION INTRAVENOUS at 10:16

## 2022-11-21 RX ADMIN — OXYCODONE HYDROCHLORIDE 5 MG: 5 TABLET ORAL at 11:29

## 2022-11-21 ASSESSMENT — ENCOUNTER SYMPTOMS: SEIZURES: 0

## 2022-11-21 NOTE — ANESTHESIA POSTPROCEDURE EVALUATION
Patient: Marco Stovall    Procedure: Procedure(s):  HERNIORRHAPHY, UMBILICAL, OPEN       Anesthesia Type:  MAC    Note:  Disposition: Outpatient   Postop Pain Control: Uneventful            Sign Out: Well controlled pain   PONV: No   Neuro/Psych: Uneventful            Sign Out: Acceptable/Baseline neuro status   Airway/Respiratory: Uneventful            Sign Out: Acceptable/Baseline resp. status   CV/Hemodynamics: Uneventful            Sign Out: Acceptable CV status; No obvious hypovolemia; No obvious fluid overload   Other NRE: NONE   DID A NON-ROUTINE EVENT OCCUR? No           Last vitals:  Vitals Value Taken Time   /79 11/21/22 1143   Temp 96.3  F (35.7  C) 11/21/22 1058   Pulse     Resp 16 11/21/22 1143   SpO2 94 % 11/21/22 1143       Electronically Signed By: Darshan Izaguirre MD  November 21, 2022  1:47 PM

## 2022-11-21 NOTE — DISCHARGE INSTRUCTIONS
[facility]  Same-Day Surgery   Adult Discharge Orders & Instructions   For 24 hours after surgery  Get plenty of rest.  A responsible adult must stay with you for at least 24 hours after you leave the hospital.   Do not drive or use heavy equipment.  If you have weakness or tingling, don't drive or use heavy equipment until this feeling goes away.  Do not drink alcohol.  Avoid strenuous or risky activities.  Ask for help when climbing stairs.   You may feel lightheaded.  IF so, sit for a few minutes before standing.  Have someone help you get up.   If you have nausea (feel sick to your stomach): Drink only clear liquids such as apple juice, ginger ale, broth or 7-Up.  Rest may also help.  Be sure to drink enough fluids.  Move to a regular diet as you feel able.  You may have a slight fever. Call the doctor if your fever is over 100 F (37.7 C) (taken under the tongue) or lasts longer than 24 hours.  You may have a dry mouth, a sore throat, muscle aches or trouble sleeping.  These should go away after 24 hours.  Do not make important or legal decisions.   Call your doctor for any of the followin.  Signs of infection (fever, growing tenderness at the surgery site, a large amount of drainage or bleeding, severe pain, foul-smelling drainage, redness, swelling).    2. It has been over 8 to 10 hours since surgery and you are still not able to urinate (pass water).    3.  Headache for over 24 hours.

## 2022-11-21 NOTE — TELEPHONE ENCOUNTER
[facility] Call Center    Phone Message    May a detailed message be left on voicemail: yes     Reason for Call: [facility] called for Dr. Espinoza due to the patient being unable to take Tylenol and Ibuprofen.  Requesting another pain management plan.  The patient is due to discharge from the [facility] at 12:30.  They advised that pain management should be sent to the patients preferred pharmacy. Please advise.  Thank you.        [id number] - [facility] [address]    Action Taken: Message routed to:  Adult Clinics: Surgery, General p [phone]    Travel Screening: Not Applicable

## 2022-11-21 NOTE — ANESTHESIA CARE TRANSFER NOTE
Patient: Marco Stovall    Procedure: Procedure(s):  HERNIORRHAPHY, UMBILICAL, OPEN       Diagnosis: Umbilical hernia without obstruction and without gangrene [K42.9]  Diagnosis Additional Information: No value filed.    Anesthesia Type:   MAC     Note:      Level of Consciousness: drowsy  Oxygen Supplementation: room air    Independent Airway: airway patency satisfactory and stable  Dentition: dentition unchanged  Vital Signs Stable: post-procedure vital signs reviewed and stable  Report to RN Given: handoff report given  Patient transferred to: Phase II    Handoff Report: Identifed the Patient, Identified the Reponsible Provider, Reviewed the pertinent medical history, Discussed the surgical course, Reviewed Intra-OP anesthesia mangement and issues during anesthesia, Set expectations for post-procedure period and Allowed opportunity for questions and acknowledgement of understanding      Vitals:  Vitals Value Taken Time   /76    Temp 96.9    Pulse 65    Resp 12    SpO2 95        Electronically Signed By: LARA Javier CRNA  November 21, 2022  11:02 AM

## 2022-11-21 NOTE — ANESTHESIA PREPROCEDURE EVALUATION
Anesthesia Pre-Procedure Evaluation    Patient: Marco Stovall   MRN: [id number] : 1965        Procedure : Procedure(s):  HERNIORRHAPHY, UMBILICAL, OPEN          Past Medical History:   Diagnosis Date     Mild intermittent asthma without complication 3/24/2016     Polyp of nasal cavity       Past Surgical History:   Procedure Laterality Date     ENT SURGERY       ETHMOIDECTOMY  2014    Procedure: ETHMOIDECTOMY;  Surgeon: Jimbo Yang MD;  Location: [facility] OR     OPTICAL TRACKING SYSTEM ENDOSCOPIC SINUS SURGERY Bilateral 12/3/2019    Procedure: Bilateral endoscopic maxillary antrostomies with tissue removal, bilateral endoscopic total ethmoidectomies, bilateral endoscopic sphenoidotomies, bilateral endoscopic frontal sinusotomies, bilateral endoscopic nasal polypectomy, image guidance;  Surgeon: Josh Swartz MD;  Location:  OR     SURGICAL HISTORY OF -       Nasal Polyp; 3 total surgeries:       Allergies   Allergen Reactions     Advil [Ibuprofen Micronized] Other (See Comments)     Tight chest     Asa [Aspirin] Nausea and Vomiting     Asthma - tight chest     Hazelnuts [Nuts] Other (See Comments)     Chest tight. Positive SPT. Tolerates other tree nuts and peanut.     Naprosyn [Naproxen] GI Disturbance     Stomach       Social History     Tobacco Use     Smoking status: Never     Smokeless tobacco: Never   Substance Use Topics     Alcohol use: Yes      Wt Readings from Last 1 Encounters:   22 89.8 kg (198 lb)        Anesthesia Evaluation   Pt has had prior anesthetic.         ROS/MED HX  ENT/Pulmonary:     (+) allergic rhinitis, Severe Persistent, asthma Treatment: Inhaler prn,   (-) recent URI   Neurologic:    (-) no seizures   Cardiovascular:    (-) hypertension and murmur   METS/Exercise Tolerance: >4 METS    Hematologic:    (-) anemia   Musculoskeletal:       GI/Hepatic:    (-) GERD and liver disease   Renal/Genitourinary:    (-) renal disease   Endo:        Psychiatric/Substance Use:       Infectious Disease:       Malignancy:       Other:            Physical Exam    Airway        Mallampati: II   TM distance: > 3 FB   Neck ROM: full   Mouth opening: > 3 cm    Respiratory Devices and Support         Dental  no notable dental history         Cardiovascular          Rhythm and rate: regular and normal (-) no murmur    Pulmonary   pulmonary exam normal        breath sounds clear to auscultation           OUTSIDE LABS:  CBC:   Lab Results   Component Value Date    WBC 7.7 11/09/2022    WBC 8.4 11/18/2019    HGB 15.0 11/09/2022    HGB 14.9 11/18/2019    HCT 44.6 11/09/2022    HCT 43.9 11/18/2019     11/09/2022     11/18/2019     BMP:   Lab Results   Component Value Date     11/09/2022     06/09/2014    POTASSIUM 4.1 11/09/2022    POTASSIUM 3.9 06/09/2014    CHLORIDE 110 (H) 11/09/2022    CHLORIDE 106 06/09/2014    CO2 29 11/09/2022    CO2 28 06/09/2014    BUN 16 11/09/2022    BUN 14 06/09/2014    CR 1.11 11/09/2022    CR 0.85 11/18/2019     (H) 11/09/2022     (H) 05/15/2017     COAGS:   Lab Results   Component Value Date    PTT 29 10/11/2010    INR 0.97 11/09/2022     POC: No results found for: BGM, HCG, HCGS  HEPATIC: No results found for: ALBUMIN, PROTTOTAL, ALT, AST, GGT, ALKPHOS, BILITOTAL, BILIDIRECT, COLLEEN  OTHER:   Lab Results   Component Value Date    MARLON 8.4 (L) 11/09/2022    TSH 2.05 07/15/2011    T4 0.85 07/15/2011    T3 125 12/14/2009       Anesthesia Plan    ASA Status:  2   NPO Status:  NPO Appropriate    Anesthesia Type: MAC.     - Reason for MAC: immobility needed   Induction: Intravenous, Propofol.   Maintenance: TIVA.        Consents    Anesthesia Plan(s) and associated risks, benefits, and realistic alternatives discussed. Questions answered and patient/representative(s) expressed understanding.    - Discussed:     - Discussed with:  Patient      - Extended Intubation/Ventilatory Support Discussed: No.      -  Patient is DNR/DNI Status: No    Use of blood products discussed: No .     Postoperative Care    Pain management: IV analgesics.   PONV prophylaxis: Ondansetron (or other 5HT-3), Background Propofol Infusion     Comments:    Other Comments: Discussed plan for general anesthetic with oral ETT. Discussed risks of sore throat, post op pain/nausea, oropharyngeal damage, rare major complications.         H&P reviewed: Unable to attach H&P to encounter due to EHR limitations. H&P Update: appropriate H&P reviewed, patient examined. No interval changes since H&P (within 30 days).         Darshan Izaguirre MD

## 2022-11-21 NOTE — INTERVAL H&P NOTE
Preop Dx: Incarcerated umbilical hernia  Planned procedure: Open umbilical hernia repair  Patient seen and examined. Questions answered and consent obtained.  No changes to previously documented H&P.

## 2022-11-21 NOTE — OP NOTE
Preoperative Diagnosis: Incarcerated umbilical hernia     Postoperative Diagnosis:  Incarcerated umbilical hernia      Procedure: Open umbilical hernia repair     Attending Surgeon: Denise Espinoza MD     Anesthesiologist: Darshan Izaguirre MD     Preoperative Note: This is a 57 year old male with a symptomatic umbilical hernia. At time of surgery, a <1 cm hernia was identified.     Operative Note:   Once the patient was sedated with MAC anesthesia, the abdomen was prepped and draped in the usual fashion. A time out was performed. Infiltration was performed with marcaine 0.25% with epinephrine using 13 ml. An infraumbilical incision was made. Dissection was performed down to the level of the rectus sheath. The umbilical stalk was encircled and detached. The incarcerated preperitoneal contents were reduced.The fascial defect was identified. Due to the small size, mesh was not utilized. The defect edges were freshened up, and then closed with 0 prolene interrupted sutures. The umbilical stalk was tacked back down with 2-0 vicryl. Skin was closed with 4-0 vicryl subcuticular. Steristrips were applied and a dry dressing with a cotton ball at the base. The patient was lightened from anesthesia and taken to recovery room in stable condition. Sponge, instrument and needle counts were correct at the end of the case. Estimated blood loss was minimal.

## 2022-11-22 ENCOUNTER — TELEPHONE (OUTPATIENT)
Dept: SURGERY | Facility: CLINIC | Age: 57
End: 2022-11-22

## 2022-11-22 DIAGNOSIS — K42.9 UMBILICAL HERNIA WITHOUT OBSTRUCTION AND WITHOUT GANGRENE: Primary | ICD-10-CM

## 2022-11-22 RX ORDER — CYCLOBENZAPRINE HCL 5 MG
5 TABLET ORAL 3 TIMES DAILY PRN
Qty: 6 TABLET | Refills: 1 | Status: SHIPPED | OUTPATIENT
Start: 2022-11-22 | End: 2024-07-16

## 2022-11-22 NOTE — TELEPHONE ENCOUNTER
RN called pt and he states he had hernia surgery yesterday and he states he wasn't sent home with any pain medication. Pt states that he was told by someone before he left that they were going to check with the doctor on some options for pain medication but pt states he never heard anything. Pt states that he cannot take Tylenol or Ibuprofen but would like to know if there is another option for him. RN encouraged pt to try ice, resting and each day the pain should improve but if not to call the clinic back. Sent a phone message to  to advise...Helena Shah RN

## 2022-11-22 NOTE — TELEPHONE ENCOUNTER
[facility] Call Center    Phone Message    May a detailed message be left on voicemail: yes     Reason for Call: Other:     Pt has hernia surgery yesterday 11/21/2022 with Dr. Espinoza. Pt is requesting a call back to discuss medication for pain management.    Action Taken: Message routed to:  Adult Clinics: Surgery, General p [phone]    Travel Screening: Not Applicable

## 2022-11-22 NOTE — TELEPHONE ENCOUNTER
replied back that she is okay with trying a muscle relaxer. RN reached out to [place] pharmacist and discussed Choices of muscle relaxers that may be safe with pt's allergies and Flexeril and Robaxin would be safe choices per Pharmacist.  notified and advised she would send in a prescription within the hour. Pt called and notified and agreed with this plan. Pt instructed to call the clinic back if any worsening symptoms develop or any further concerns..Helena Shah RN

## 2022-12-19 ENCOUNTER — VIRTUAL VISIT (OUTPATIENT)
Dept: SURGERY | Facility: CLINIC | Age: 57
End: 2022-12-19
Payer: COMMERCIAL

## 2022-12-19 DIAGNOSIS — K42.9 UMBILICAL HERNIA WITHOUT OBSTRUCTION AND WITHOUT GANGRENE: Primary | ICD-10-CM

## 2022-12-19 PROCEDURE — 99024 POSTOP FOLLOW-UP VISIT: CPT | Mod: 95 | Performed by: SURGERY

## 2022-12-19 NOTE — PROGRESS NOTES
Marco is a 57 year old who is being evaluated via a billable video visit.      How would you like to obtain your AVS? Shahiya  If the video visit is dropped, the invitation should be resent by: Text to cell phone: [phone]  Will anyone else be joining your video visit? No        Video-Visit Details    Video Start Time: 3:34 PM    Type of service:  Video Visit    Video End Time:3:38 PM    Originating Location (pt. Location): Home        Distant Location (provider location):  On-site    Platform used for Video Visit: Archana     57 year old man status post open umbilical hernia 4 weeks ago. The patient has no complaints, is off narcotics, and has returned to normal activities.       Abdomen: Incisions well healed      Impression and Plan:  Doing well status post umbilical hernia repair. F/U prn.

## 2022-12-19 NOTE — NURSING NOTE
Marco Stovall's chief complaint for this visit includes:  Chief Complaint   Patient presents with     Post-Op - General Surgery     Umbilical hernia, no concerns     PCP: Alber - [facility]    Referring Provider:  Denise Espinoza MD  [address] [address] [place] [address]    There were no vitals taken for this visit.  Data Unavailable        Allergies   Allergen Reactions     Advil [Ibuprofen Micronized] Other (See Comments)     Tight chest     Asa [Aspirin] Nausea and Vomiting     Asthma - tight chest     Hazelnuts [Nuts] Other (See Comments)     Chest tight. Positive SPT. Tolerates other tree nuts and peanut.     Naprosyn [Naproxen] GI Disturbance     Stomach      Tylenol [Acetaminophen] Nausea         Do you need any medication refills at today's visit?

## 2023-04-09 ENCOUNTER — HEALTH MAINTENANCE LETTER (OUTPATIENT)
Age: 58
End: 2023-04-09

## 2023-08-10 ENCOUNTER — TELEPHONE (OUTPATIENT)
Dept: FAMILY MEDICINE | Facility: CLINIC | Age: 58
End: 2023-08-10
Payer: COMMERCIAL

## 2023-08-10 NOTE — TELEPHONE ENCOUNTER
Patient Quality Outreach    Patient is due for the following:   Asthma  -  ACT needed and AAP  Colon Cancer Screening  Physical Preventive Adult Physical      Topic Date Due    Pneumococcal Vaccine (2 - PCV) 10/25/2014    Zoster (Shingles) Vaccine (1 of 2) Never done    Hepatitis B Vaccine (2 of 3 - 19+ 3-dose series) 04/30/2019    Diptheria Tetanus Pertussis (DTAP/TDAP/TD) Vaccine (2 - Td or Tdap) 10/11/2020    COVID-19 Vaccine (4 - Booster for Yumiko series) 07/01/2022       Next Steps:   Schedule a Adult Preventative    Type of outreach:    Sent GÃ¼dpod message.      Questions for provider review:    None           Scarlett Allen, IVETH

## 2023-09-25 DIAGNOSIS — J45.50 SEVERE PERSISTENT ASTHMA WITHOUT COMPLICATION (H): ICD-10-CM

## 2023-09-26 RX ORDER — ALBUTEROL SULFATE 90 UG/1
1-2 AEROSOL, METERED RESPIRATORY (INHALATION) EVERY 4 HOURS PRN
Qty: 18 G | Refills: 0 | Status: SHIPPED | OUTPATIENT
Start: 2023-09-26 | End: 2024-02-06

## 2024-01-02 NOTE — NURSING NOTE
"Chief Complaint   Patient presents with     Nose Problem     nasal polyps - surgery x4 on polyps on both sides       Initial /75 (BP Location: Right arm, Patient Position: Chair, Cuff Size: Adult Regular)   Pulse 67   Temp 96.9  F (36.1  C) (Tympanic)   Resp 18   Ht 1.727 m (5' 8\")   Wt 83 kg (183 lb)   BMI 27.83 kg/m   Estimated body mass index is 27.83 kg/m  as calculated from the following:    Height as of this encounter: 1.727 m (5' 8\").    Weight as of this encounter: 83 kg (183 lb).  Medications and allergies reviewed.    Michelle LOPEZ CMA ([facility])    " "No chief complaint on file.          History of Present Illness  69-year-old male presents today for follow-up after hospitalization December 2023 with small bowel obstruction.  Last office visit April 12, 2022.  Family history of colon polyps and family history of colon cancer in his maternal grandmother.  He has a personal history of numerous and large colon polyps and chronic constipation.    CT scan with dilated fluid-filled loops of small bowel proximal to possible transition point also large stool burden.  Patient was evaluated by general surgery and NG tube was placed and obstruction was managed conservatively and patient also received Gastrografin via NG tube with improvement in obstructive symptoms and NG tube was removed.  During hospitalization patient was also treated aggressively for significant constipation with milk of magnesium, enema, mineral oil, lactulose.      He is currently taking MiraLAX and stool softener daily.  He will average a bowel movement every 5 to 7 days, he is currently taking MiraLAX and stool softener daily.  He was prescribed lactulose upon discharge from the hospital but is only taking this as needed.  He has not tried daily use of lactulose.    He is a resident of [facility] in [place].  Phone number [phone].      Result Review :       Patient admitted December 22, 2023 discharge December 24, 2023, discharge summary reviewed [facility].    CT Abdomen Pelvis Without Contrast (12/22/2023 10:10)    Vital Signs:   /70   Pulse 97   Temp 97 °F (36.1 °C)   Ht 182.9 cm (72.01\")   Wt 73.5 kg (162 lb 1.6 oz)   SpO2 96%   BMI 21.98 kg/m²     Body mass index is 21.98 kg/m².     Physical Exam  Vitals reviewed.   Constitutional:       General: He is not in acute distress.     Appearance: Normal appearance. He is not ill-appearing or toxic-appearing.   Eyes:      General: No scleral icterus.  Pulmonary:      Effort: Pulmonary effort is " normal. No respiratory distress.   Skin:     Coloration: Skin is not jaundiced.   Neurological:      Mental Status: He is alert and oriented to person, place, and time.   Psychiatric:         Mood and Affect: Mood normal.         Behavior: Behavior normal.         Thought Content: Thought content normal.         Judgment: Judgment normal.       Assessment and Plan    Diagnoses and all orders for this visit:    1. Chronic idiopathic constipation (Primary)  -     lactulose (CHRONULAC) 10 GM/15ML solution; Take 15 mL by mouth Daily.  Dispense: 450 mL; Refill: 5    2. Small bowel obstruction    3. Family history of colon cancer    4. History of colon polyps    5. Screening for malignant neoplasm of colon       Reviewed recent hospitalization records, patient with small bowel obstruction, no previous abdominal surgeries.  Symptoms improved conservatively, patient has significant constipation and is currently only using lactulose as needed.  He can go 5 to 7 days without a bowel movement.  Recommend daily lactulose and that he continue MiraLAX and stool softener.  The goal is 3-4 bowel movements per week or more frequently if the addition of lactulose promotes more regular bowel movements however if diarrhea and urgency occurs with the addition of lactulose, decrease lactulose to every other day.  We can also increase lactulose to twice daily based on clinical course.    Colonoscopy May 17, 2023 with 12 colon polyps ranging in size from 2 to 13 mm.  Colonoscopy January 6, 2022 with 6 colon polyps, poor to good bowel prep, polyps ranging in size from 2 to 15 mm, hepatic flexure polyp was 15 mm in size, tattoo placed.  Dr. Valdivia has recommended repeat colonoscopy in 1 year, orders have been placed.  Patient is due for colonoscopy May 2024, orders placed and written information will be given with instructions for 2-day prep.    Patient states that his brother or a friend will bring him to the colonoscopy however if this is  not the case, patient is agreeable to contact the office prior to colonoscopy as he has used a medical taxi that has been coordinated with  Juan with [facility] where he resides at [phone] in the past with previous endoscopic procedures and Jewell the endoscopy manager as well as our office.             Patient Instructions   Start lactulose 15 ml once daily for constipation    If diarrhea and urgency persists, decrease lactulose to every other day  We can increase dose of lactulose for daily use as well if it does not promote more regular BM at lower dose    Continue miralax and stool softener daily    Goal is 3-4 BM per week or more frequently if regimen dictates  But do not want accidents    Schedule colonoscopy May 2024  Two day prep        EMR Dragon/Transcription Disclaimer:  This document has been Dictated utilizing Dragon dictation.

## 2024-02-04 ENCOUNTER — OFFICE VISIT (OUTPATIENT)
Dept: URGENT CARE | Facility: URGENT CARE | Age: 59
End: 2024-02-04
Payer: COMMERCIAL

## 2024-02-04 VITALS
SYSTOLIC BLOOD PRESSURE: 153 MMHG | TEMPERATURE: 98.8 F | RESPIRATION RATE: 16 BRPM | WEIGHT: 190.9 LBS | HEART RATE: 75 BPM | BODY MASS INDEX: 29.03 KG/M2 | OXYGEN SATURATION: 95 % | DIASTOLIC BLOOD PRESSURE: 95 MMHG

## 2024-02-04 DIAGNOSIS — J45.40 MODERATE PERSISTENT ASTHMATIC BRONCHITIS WITHOUT COMPLICATION: Primary | ICD-10-CM

## 2024-02-04 PROCEDURE — 99213 OFFICE O/P EST LOW 20 MIN: CPT | Performed by: PHYSICIAN ASSISTANT

## 2024-02-04 RX ORDER — BENZONATATE 200 MG/1
200 CAPSULE ORAL 3 TIMES DAILY PRN
Qty: 30 CAPSULE | Refills: 0 | Status: SHIPPED | OUTPATIENT
Start: 2024-02-04 | End: 2024-02-14

## 2024-02-04 RX ORDER — AZITHROMYCIN 250 MG/1
TABLET, FILM COATED ORAL
Qty: 6 TABLET | Refills: 0 | Status: SHIPPED | OUTPATIENT
Start: 2024-02-04 | End: 2024-07-16

## 2024-02-04 RX ORDER — PREDNISONE 20 MG/1
40 TABLET ORAL DAILY
Qty: 14 TABLET | Refills: 0 | Status: SHIPPED | OUTPATIENT
Start: 2024-02-04 | End: 2024-02-11

## 2024-02-04 ASSESSMENT — ENCOUNTER SYMPTOMS
SINUS PRESSURE: 0
FATIGUE: 0
SORE THROAT: 0
DIARRHEA: 0
PALPITATIONS: 0
CHILLS: 0
SINUS PAIN: 0
CARDIOVASCULAR NEGATIVE: 1
RHINORRHEA: 0
VOMITING: 0
NAUSEA: 0
SHORTNESS OF BREATH: 1
WHEEZING: 1
FEVER: 1
COUGH: 1

## 2024-02-04 NOTE — PROGRESS NOTES
Erin Lara is a 58 year old, presenting for the following health issues:  Cough    HPI   Acute Illness  Acute illness concerns:   Onset/Duration: 1week  Symptoms:  Fever: YES- subjective  Chills/Sweats: No  Headache (location?): No  Sinus Pressure: No  Conjunctivitis:  No  Ear Pain: no  Rhinorrhea: No  Congestion: No  Sore Throat: No  Cough: YES-productive of yellow sputum, with shortness of breath  Wheeze: YES  Decreased Appetite: No  Nausea: No  Vomiting: No  Diarrhea: No  Dysuria/Freq.: No  Dysuria or Hematuria: No  Fatigue/Achiness: No  Sick/Strep Exposure: No.  Hx of asthma  Therapies tried and outcome: rest,fluids,cough meds,inhaler with minimal relief    Patient Active Problem List   Diagnosis    Screening for hypertension    Chronic maxillary sinusitis    Hyperlipidemia LDL goal <130    Severe persistent asthma without complication (H28)    Nasal polyp    Genital herpes simplex, unspecified site    Samter's triad    Aspirin sensitivity    Seasonal allergic rhinitis due to pollen    Allergic rhinitis due to dust mite    Chronic pansinusitis    Nasal polyposis    History of endoscopic sinus surgery    Umbilical hernia without obstruction and without gangrene     Current Outpatient Medications   Medication    albuterol (PROAIR HFA/PROVENTIL HFA/VENTOLIN HFA) 108 (90 Base) MCG/ACT inhaler    cyclobenzaprine (FLEXERIL) 5 MG tablet    fluticasone-vilanterol (BREO ELLIPTA) 200-25 MCG/INH inhaler    montelukast (SINGULAIR) 10 MG tablet    Multiple Vitamin (MULTIVITAMIN) per tablet    sildenafil (REVATIO) 20 MG tablet     No current facility-administered medications for this visit.        Allergies   Allergen Reactions    Advil [Ibuprofen Micronized] Other (See Comments)     Tight chest    Asa [Aspirin] Nausea and Vomiting     Asthma - tight chest    Hazelnuts [Nuts] Other (See Comments)     Chest tight. Positive SPT. Tolerates other tree nuts and peanut.    Naprosyn [Naproxen] GI Disturbance     Stomach      Tylenol [Acetaminophen] Nausea       Review of Systems   Constitutional:  Positive for fever. Negative for chills and fatigue.   HENT:  Negative for congestion, ear pain, rhinorrhea, sinus pressure, sinus pain and sore throat.    Respiratory:  Positive for cough, shortness of breath and wheezing.    Cardiovascular: Negative.  Negative for chest pain, palpitations and leg swelling.   Gastrointestinal:  Negative for diarrhea, nausea and vomiting.   All other systems reviewed and are negative.          Objective    BP (!) 153/95   Pulse 75   Temp 98.8  F (37.1  C) (Tympanic)   Resp 16   Wt 86.6 kg (190 lb 14.4 oz)   SpO2 95%   BMI 29.03 kg/m    Body mass index is 29.03 kg/m .  Physical Exam  Vitals and nursing note reviewed.   Constitutional:       General: He is not in acute distress.     Appearance: Normal appearance. He is normal weight. He is not ill-appearing.   HENT:      Head: Normocephalic and atraumatic.      Ears:      Comments: TMs are intact without any erythema or bulging bilaterally.  Airway is patent.     Nose: Nose normal.      Mouth/Throat:      Lips: Pink.      Mouth: Mucous membranes are moist.      Pharynx: Oropharynx is clear. Uvula midline. No pharyngeal swelling, oropharyngeal exudate, posterior oropharyngeal erythema or uvula swelling.      Tonsils: No tonsillar exudate or tonsillar abscesses.   Eyes:      General: No scleral icterus.     Extraocular Movements: Extraocular movements intact.      Conjunctiva/sclera: Conjunctivae normal.      Pupils: Pupils are equal, round, and reactive to light.   Neck:      Thyroid: No thyromegaly.   Cardiovascular:      Rate and Rhythm: Normal rate and regular rhythm.      Pulses: Normal pulses.      Heart sounds: Normal heart sounds, S1 normal and S2 normal. No murmur heard.     No friction rub. No gallop.   Pulmonary:      Effort: Pulmonary effort is normal. No tachypnea, accessory muscle usage, respiratory distress or retractions.      Breath  sounds: Normal air entry. No stridor. Examination of the right-lower field reveals wheezing. Examination of the left-lower field reveals wheezing. Wheezing present. No decreased breath sounds, rhonchi or rales.   Musculoskeletal:      Cervical back: Normal range of motion and neck supple.   Lymphadenopathy:      Cervical: No cervical adenopathy.   Skin:     General: Skin is warm and dry.      Findings: No rash.   Neurological:      Mental Status: He is alert and oriented to person, place, and time.   Psychiatric:         Mood and Affect: Mood normal.         Behavior: Behavior normal.         Thought Content: Thought content normal.         Judgment: Judgment normal.             Assessment/Plan:  Moderate persistent asthmatic bronchitis without complication:  Will treat with zithromax X5days, [id number], tessalon perles, and continue with his albuterol inh as needed for symptoms.  Recommend treatment with rest, fluids and chicken soup. Tylenol/ibuprofen prn fever/pain.  Recheck in clinic if symptoms worsen or if symptoms do not improve.  To the ER if he develops hemoptysis, chest pain, fevers>102, worsening shortness of breath/wheezing.    -     azithromycin (ZITHROMAX) 250 MG tablet; 2 tablets the first day, then 1 tablet daily for the next 4 days  -     predniSONE (DELTASONE) 20 MG tablet; Take 2 tablets (40 mg) by mouth daily for 7 days  -     benzonatate (TESSALON) 200 MG capsule; Take 1 capsule (200 mg) by mouth 3 times daily as needed for cough        Radha Garcia PA-C

## 2024-02-06 ENCOUNTER — MYC REFILL (OUTPATIENT)
Dept: FAMILY MEDICINE | Facility: CLINIC | Age: 59
End: 2024-02-06
Payer: COMMERCIAL

## 2024-02-06 DIAGNOSIS — J45.50 SEVERE PERSISTENT ASTHMA WITHOUT COMPLICATION (H): ICD-10-CM

## 2024-02-06 RX ORDER — ALBUTEROL SULFATE 90 UG/1
1-2 AEROSOL, METERED RESPIRATORY (INHALATION) EVERY 4 HOURS PRN
Qty: 18 G | Refills: 0 | Status: SHIPPED | OUTPATIENT
Start: 2024-02-06

## 2024-02-13 ENCOUNTER — OFFICE VISIT (OUTPATIENT)
Dept: ALLERGY | Facility: CLINIC | Age: 59
End: 2024-02-13
Payer: COMMERCIAL

## 2024-02-13 VITALS
RESPIRATION RATE: 16 BRPM | DIASTOLIC BLOOD PRESSURE: 91 MMHG | HEART RATE: 76 BPM | WEIGHT: 189.1 LBS | OXYGEN SATURATION: 98 % | BODY MASS INDEX: 28.75 KG/M2 | SYSTOLIC BLOOD PRESSURE: 137 MMHG

## 2024-02-13 DIAGNOSIS — J30.1 SEASONAL ALLERGIC RHINITIS DUE TO POLLEN: ICD-10-CM

## 2024-02-13 DIAGNOSIS — J32.0 CHRONIC MAXILLARY SINUSITIS: Primary | ICD-10-CM

## 2024-02-13 DIAGNOSIS — R53.83 OTHER FATIGUE: ICD-10-CM

## 2024-02-13 DIAGNOSIS — J45.50 SEVERE PERSISTENT ASTHMA WITHOUT COMPLICATION (H): Chronic | ICD-10-CM

## 2024-02-13 DIAGNOSIS — J32.4 CHRONIC PANSINUSITIS: ICD-10-CM

## 2024-02-13 DIAGNOSIS — J33.9 SAMTER'S TRIAD: ICD-10-CM

## 2024-02-13 DIAGNOSIS — J33.9 NASAL POLYPOSIS: ICD-10-CM

## 2024-02-13 DIAGNOSIS — J45.909 SAMTER'S TRIAD: ICD-10-CM

## 2024-02-13 DIAGNOSIS — Z88.6 SAMTER'S TRIAD: ICD-10-CM

## 2024-02-13 PROCEDURE — 99204 OFFICE O/P NEW MOD 45 MIN: CPT | Performed by: INTERNAL MEDICINE

## 2024-02-13 RX ORDER — FLUTICASONE PROPIONATE 50 MCG
2 SPRAY, SUSPENSION (ML) NASAL DAILY
Qty: 16 G | Refills: 4 | Status: SHIPPED | OUTPATIENT
Start: 2024-02-13

## 2024-02-13 RX ORDER — MONTELUKAST SODIUM 10 MG/1
10 TABLET ORAL AT BEDTIME
Qty: 90 TABLET | Refills: 3 | Status: SHIPPED | OUTPATIENT
Start: 2024-02-13

## 2024-02-13 RX ORDER — FLUTICASONE FUROATE, UMECLIDINIUM BROMIDE AND VILANTEROL TRIFENATATE 100; 62.5; 25 UG/1; UG/1; UG/1
1 POWDER RESPIRATORY (INHALATION) DAILY
Qty: 1 EACH | Refills: 4 | Status: SHIPPED | OUTPATIENT
Start: 2024-02-13 | End: 2024-06-27

## 2024-02-13 RX ORDER — PREDNISONE 10 MG/1
TABLET ORAL
Qty: 24 TABLET | Refills: 0 | Status: SHIPPED | OUTPATIENT
Start: 2024-02-13 | End: 2024-07-16

## 2024-02-13 ASSESSMENT — ASTHMA QUESTIONNAIRES
QUESTION_4 LAST FOUR WEEKS HOW OFTEN HAVE YOU USED YOUR RESCUE INHALER OR NEBULIZER MEDICATION (SUCH AS ALBUTEROL): ONE OR TWO TIMES PER DAY
QUESTION_5 LAST FOUR WEEKS HOW WOULD YOU RATE YOUR ASTHMA CONTROL: SOMEWHAT CONTROLLED
ACT_TOTALSCORE: 13
QUESTION_3 LAST FOUR WEEKS HOW OFTEN DID YOUR ASTHMA SYMPTOMS (WHEEZING, COUGHING, SHORTNESS OF BREATH, CHEST TIGHTNESS OR PAIN) WAKE YOU UP AT NIGHT OR EARLIER THAN USUAL IN THE MORNING: TWO OR THREE NIGHTS A WEEK
ACT_TOTALSCORE: 13
QUESTION_1 LAST FOUR WEEKS HOW MUCH OF THE TIME DID YOUR ASTHMA KEEP YOU FROM GETTING AS MUCH DONE AT WORK, SCHOOL OR AT HOME: SOME OF THE TIME
QUESTION_2 LAST FOUR WEEKS HOW OFTEN HAVE YOU HAD SHORTNESS OF BREATH: THREE TO SIX TIMES A WEEK

## 2024-02-13 ASSESSMENT — ENCOUNTER SYMPTOMS
COUGH: 1
CHEST TIGHTNESS: 1
RHINORRHEA: 1

## 2024-02-13 NOTE — PATIENT INSTRUCTIONS
Trelegy 1 puff daily ([email or web address].com and print coupon).    Singulair 10 mg at night  Flonase 2 sprays each nostril daily  Will order blood tests  Breathing tests in 2 months  Prednisone taper      Allergy Staff Appt Hours Shot Hours Location       Physician   Serg Sanchez MD      Support Staff   IVETH Denise MA Emily J., MA      Mondays Tuesdays Thursdays and Fridays:      [place] 7-5      Wednesdays         Close                Mondays, Tuesdays and Fridays:  7:20 - 3:40              [facility]  [address]  [place] [address]  Allergy appointment  line: [phone]    Pulmonary Function Scheduling:  [place]: [phone]           Questions about cost of your care  For questions about your cost of your visit, procedure, lab or imaging contact: [employer] Line [phone] or visit:  [email or web address]    Prescription Assistance  If you need assistance with your prescriptions (cost, coverage, etc) please contact: [employer] Prescription Assistance Program [phone]    Important Scheduling Information  All visits for food challenges, medication/drug allergy testing, and drug challenges MUST be scheduled through the allergy clinic nurse. Please contact them via [employer] or by calling the clinic at [phone] and asking to speak with an allergy nurse. They will provide additional information and instructions for the appointment. Discontinue oral antihistamines 7 days prior to the appointment. Discontinue nasal and ocular antihistamines 1 day prior to the appointment.    Appointments for skin testing: Appointment will last approximately 45 minutes.  Please call the appointment line for your clinic to schedule.  Discontinue oral antihistamines 7 days prior to the appointment.  Discontinue nasal and ocular antihistamines 1 days prior to appointment.    Thank you for trusting us with your care. Please feel free to  contact us with any questions or concerns you may have.

## 2024-02-13 NOTE — PROGRESS NOTES
Marco Stovall was seen in the Allergy Clinic at [facility].    Marco Stovall is a 58 year old male being seen today in consultation for Severe persistent asthma without complication, history of recurrent nasal polyps with 5 nasal surgeries, the last being in 2019.  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  He was placed on Dupixent for approximately 1 year in 2020 which worked quite well.  Based on medical chart review, Dupixent became unavailable because of insurance coverage.  He was following with Dr. Pierson, however did not return for follow-up.  Skin testing was positive to dust mite, grass, trees, weeds and molds in the past.    On February 4 he went to urgent care and was treated with azithromycin and prednisone due to bronchitis and possible sinusitis.  He had a productive cough.  He currently does not have an up-to-date maintenance medication such as Breo.  Breo cost $300 at the last fill so he does not want to have that again in the future.  He currently complains of fatigue and body inflammation.  He describes pain of his body but not in this specific areas.  It is diffuse.    He also needs refills for montelukast or Singulair.      Past Medical History:   Diagnosis Date    Mild intermittent asthma without complication 3/24/2016    Polyp of nasal cavity      Family History   Problem Relation Age of Onset    Hypertension Mother     Thyroid Disease Mother         hypothyroidism    Hypertension Father     Diabetes Maternal Grandfather     Thyroid Disease Daughter         hypothyroidism    Asthma No family hx of     C.A.D. No family hx of     Cerebrovascular Disease No family hx of     Breast Cancer No family hx of     Cancer - colorectal No family hx of     Prostate Cancer No family hx of      Past Surgical History:   Procedure Laterality Date    ENT SURGERY      ETHMOIDECTOMY  6/16/2014    Procedure: ETHMOIDECTOMY;  Surgeon:  Jimbo Yang MD;  Location: [facility] OR    HERNIORRHAPHY UMBILICAL N/A 11/21/2022    Procedure: HERNIORRHAPHY, UMBILICAL, OPEN;  Surgeon: Denise Espinoza MD;  Location:  OR    OPTICAL TRACKING SYSTEM ENDOSCOPIC SINUS SURGERY Bilateral 12/3/2019    Procedure: Bilateral endoscopic maxillary antrostomies with tissue removal, bilateral endoscopic total ethmoidectomies, bilateral endoscopic sphenoidotomies, bilateral endoscopic frontal sinusotomies, bilateral endoscopic nasal polypectomy, image guidance;  Surgeon: Josh Swartz MD;  Location:  OR    SURGICAL HISTORY OF -   2010    Nasal Polyp; 3 total surgeries: 1997       ENVIRONMENTAL HISTORY:   Pets inside the house include 4 cat(s).  Do you smoke cigarettes or other recreational drugs? No There is/are 0 smokers living in the house. The house does not have a damp basement.     SOCIAL HISTORY:   Marco is employed as [employer]. He lives with his partner.      Review of Systems   HENT:  Positive for postnasal drip and rhinorrhea.    Respiratory:  Positive for cough and chest tightness.          Current Outpatient Medications:     benzonatate (TESSALON) 200 MG capsule, Take 1 capsule (200 mg) by mouth 3 times daily as needed for cough, Disp: 30 capsule, Rfl: 0    fluticasone (FLONASE) 50 MCG/ACT nasal spray, Spray 2 sprays into both nostrils daily, Disp: 16 g, Rfl: 4    Fluticasone-Umeclidin-Vilant (TRELEGY ELLIPTA) 100-62.5-25 MCG/ACT oral inhaler, Inhale 1 puff into the lungs daily, Disp: 1 each, Rfl: 4    montelukast (SINGULAIR) 10 MG tablet, Take 1 tablet (10 mg) by mouth at bedtime, Disp: 90 tablet, Rfl: 3    predniSONE (DELTASONE) 10 MG tablet, Take 3 tabs daily x 4 days, 2 tabs daily x 4 days, and 1 tab daily x 4 days, Disp: 24 tablet, Rfl: 0    albuterol (PROAIR HFA/PROVENTIL HFA/VENTOLIN HFA) 108 (90 Base) MCG/ACT inhaler, Inhale 1-2 puffs into the lungs every 4 hours as needed for shortness of breath, wheezing or other (persistent cough) Please  follow up in clinic for next refill., Disp: 18 g, Rfl: 0    azithromycin (ZITHROMAX) 250 MG tablet, 2 tablets the first day, then 1 tablet daily for the next 4 days (Patient not taking: Reported on 2/13/2024), Disp: 6 tablet, Rfl: 0    cyclobenzaprine (FLEXERIL) 5 MG tablet, Take 1 tablet (5 mg) by mouth 3 times daily as needed for muscle spasms (Patient not taking: Reported on 2/13/2024), Disp: 6 tablet, Rfl: 1    fluticasone-vilanterol (BREO ELLIPTA) 200-25 MCG/INH inhaler, Inhale 1 puff into the lungs daily (Patient not taking: Reported on 2/13/2024), Disp: 60 each, Rfl: 5    Multiple Vitamin (MULTIVITAMIN) per tablet, Take 1 tablet by mouth daily. (Patient not taking: Reported on 2/13/2024), Disp: 100 tablet, Rfl: 12    sildenafil (REVATIO) 20 MG tablet, Take 1-5 tablets 30-60 min before intimacy, Disp: 30 tablet, Rfl: 11  Allergies   Allergen Reactions    Advil [Ibuprofen Micronized] Other (See Comments)     Tight chest    Asa [Aspirin] Nausea and Vomiting     Asthma - tight chest    Hazelnuts [Nuts] Other (See Comments)     Chest tight. Positive SPT. Tolerates other tree nuts and peanut.    Naprosyn [Naproxen] GI Disturbance     Stomach     Tylenol [Acetaminophen] Nausea         EXAM:   BP (!) 137/91 (BP Location: Left arm, Patient Position: Sitting, Cuff Size: Adult Regular)   Pulse 76   Resp 16   Wt 85.8 kg (189 lb 1.6 oz)   SpO2 98%   BMI 28.75 kg/m      Physical Exam    Constitutional:       General: He is not in acute distress.     Appearance: Normal appearance. He is not ill-appearing.   HENT:      Head: Normocephalic and atraumatic.      Nose: Nose normal. No congestion or rhinorrhea.      Mouth/Throat:      Mouth: Mucous membranes are moist.      Pharynx: Oropharynx is clear. No posterior oropharyngeal erythema.   Eyes:      General:         Right eye: No discharge.         Left eye: No discharge.   Cardiovascular:      Rate and Rhythm: Normal rate and regular rhythm.      Heart sounds: Normal  heart sounds.   Pulmonary:      Effort: Pulmonary effort is normal.      Breath sounds: Normal breath sounds. No wheezing or rhonchi.   Skin:     General: Skin is warm.      Findings: No erythema or rash.   Neurological:      General: No focal deficit present.      Mental Status: He is alert. Mental status is at baseline.   Psychiatric:         Mood and Affect: Mood normal.         Behavior: Behavior normal.        ASSESSMENT/PLAN:  Marco Stovall is a 58 year old male seen today for recent asthma exacerbation with persistent cough, history of recurrent nasal polyps with multiple surgeries, history of aspirin sensitivity and Samter's triad, allergic rhinitis, and use of Biologics with Dupixent in the past which was then not covered by insurance.  Currently does not have a maintenance medication because of insurance coverage and cost of the inhaler.  Has had some recent increase in symptoms.  However it sounds like the last 3 years have been relatively well up until recent asthma flare.    Will treat with a lower dose tapered prednisone course to see if we can help with his persistent asthma exacerbation.  Will also start Trelegy which appears to be covered by insurance.  This will be 1 puff daily.  Singulair was refilled.  Allergy immunotherapy could be considered in the future.  He will likely need to discuss with his primary care provider in regards to his fatigue.  Will check a CBC and differential.  He also had a concern about celiac disease and his daughter has celiac disease.    Trelegy 1 puff daily ([email or web address].com and print coupon).    Singulair 10 mg at night  Flonase 2 sprays each nostril daily  Will order blood tests  Breathing tests in 2 months  Prednisone taper    Follow-up in 2 months      Thank you for allowing me to participate in the care of Marco Stovall.      I spent 45 minutes on the date of the encounter doing chart review, history and exam, documentation and further coordination as noted  above exclusive of separately reported interpretations    Serg Sanchez MD  Allergy/Immunology  [facility] [place]

## 2024-02-13 NOTE — LETTER
2/13/2024         RE: Marco Stovall  [address]  [place] [address]        Dear Colleague,    Thank you for referring your patient, Marco Stovall, to the [facility] [place]. Please see a copy of my visit note below.    Marco Stovall was seen in the Allergy Clinic at [facility].    Marco Stovall is a 58 year old male being seen today in consultation for Severe persistent asthma without complication, history of recurrent nasal polyps with 5 nasal surgeries, the last being in 2019.  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  He was placed on Dupixent for approximately 1 year in 2020 which worked quite well.  Based on medical chart review, Dupixent became unavailable because of insurance coverage.  He was following with Dr. Pierson, however did not return for follow-up.  Skin testing was positive to dust mite, grass, trees, weeds and molds in the past.    On February 4 he went to urgent care and was treated with azithromycin and prednisone due to bronchitis and possible sinusitis.  He had a productive cough.  He currently does not have an up-to-date maintenance medication such as Breo.  Breo cost $300 at the last fill so he does not want to have that again in the future.  He currently complains of fatigue and body inflammation.  He describes pain of his body but not in this specific areas.  It is diffuse.    He also needs refills for montelukast or Singulair.      Past Medical History:   Diagnosis Date     Mild intermittent asthma without complication 3/24/2016     Polyp of nasal cavity      Family History   Problem Relation Age of Onset     Hypertension Mother      Thyroid Disease Mother         hypothyroidism     Hypertension Father      Diabetes Maternal Grandfather      Thyroid Disease Daughter         hypothyroidism     Asthma No family hx of      C.A.D. No family hx of       Cerebrovascular Disease No family hx of      Breast Cancer No family hx of      Cancer - colorectal No family hx of      Prostate Cancer No family hx of      Past Surgical History:   Procedure Laterality Date     ENT SURGERY       ETHMOIDECTOMY  6/16/2014    Procedure: ETHMOIDECTOMY;  Surgeon: Jimbo Yang MD;  Location: [facility] OR     HERNIORRHAPHY UMBILICAL N/A 11/21/2022    Procedure: HERNIORRHAPHY, UMBILICAL, OPEN;  Surgeon: Denise Espinoza MD;  Location:  OR     OPTICAL TRACKING SYSTEM ENDOSCOPIC SINUS SURGERY Bilateral 12/3/2019    Procedure: Bilateral endoscopic maxillary antrostomies with tissue removal, bilateral endoscopic total ethmoidectomies, bilateral endoscopic sphenoidotomies, bilateral endoscopic frontal sinusotomies, bilateral endoscopic nasal polypectomy, image guidance;  Surgeon: Josh Swartz MD;  Location:  OR     SURGICAL HISTORY OF -   2010    Nasal Polyp; 3 total surgeries: 1997       ENVIRONMENTAL HISTORY:   Pets inside the house include 4 cat(s).  Do you smoke cigarettes or other recreational drugs? No There is/are 0 smokers living in the house. The house does not have a damp basement.     SOCIAL HISTORY:   Marco is employed as [employer]. He lives with his partner.      Review of Systems   HENT:  Positive for postnasal drip and rhinorrhea.    Respiratory:  Positive for cough and chest tightness.          Current Outpatient Medications:      benzonatate (TESSALON) 200 MG capsule, Take 1 capsule (200 mg) by mouth 3 times daily as needed for cough, Disp: 30 capsule, Rfl: 0     fluticasone (FLONASE) 50 MCG/ACT nasal spray, Spray 2 sprays into both nostrils daily, Disp: 16 g, Rfl: 4     Fluticasone-Umeclidin-Vilant (TRELEGY ELLIPTA) 100-62.5-25 MCG/ACT oral inhaler, Inhale 1 puff into the lungs daily, Disp: 1 each, Rfl: 4     montelukast (SINGULAIR) 10 MG tablet, Take 1 tablet (10 mg) by mouth at bedtime, Disp: 90 tablet, Rfl: 3     predniSONE (DELTASONE) 10 MG tablet, Take 3  tabs daily x 4 days, 2 tabs daily x 4 days, and 1 tab daily x 4 days, Disp: 24 tablet, Rfl: 0     albuterol (PROAIR HFA/PROVENTIL HFA/VENTOLIN HFA) 108 (90 Base) MCG/ACT inhaler, Inhale 1-2 puffs into the lungs every 4 hours as needed for shortness of breath, wheezing or other (persistent cough) Please follow up in clinic for next refill., Disp: 18 g, Rfl: 0     azithromycin (ZITHROMAX) 250 MG tablet, 2 tablets the first day, then 1 tablet daily for the next 4 days (Patient not taking: Reported on 2/13/2024), Disp: 6 tablet, Rfl: 0     cyclobenzaprine (FLEXERIL) 5 MG tablet, Take 1 tablet (5 mg) by mouth 3 times daily as needed for muscle spasms (Patient not taking: Reported on 2/13/2024), Disp: 6 tablet, Rfl: 1     fluticasone-vilanterol (BREO ELLIPTA) 200-25 MCG/INH inhaler, Inhale 1 puff into the lungs daily (Patient not taking: Reported on 2/13/2024), Disp: 60 each, Rfl: 5     Multiple Vitamin (MULTIVITAMIN) per tablet, Take 1 tablet by mouth daily. (Patient not taking: Reported on 2/13/2024), Disp: 100 tablet, Rfl: 12     sildenafil (REVATIO) 20 MG tablet, Take 1-5 tablets 30-60 min before intimacy, Disp: 30 tablet, Rfl: 11  Allergies   Allergen Reactions     Advil [Ibuprofen Micronized] Other (See Comments)     Tight chest     Asa [Aspirin] Nausea and Vomiting     Asthma - tight chest     Hazelnuts [Nuts] Other (See Comments)     Chest tight. Positive SPT. Tolerates other tree nuts and peanut.     Naprosyn [Naproxen] GI Disturbance     Stomach      Tylenol [Acetaminophen] Nausea         EXAM:   BP (!) 137/91 (BP Location: Left arm, Patient Position: Sitting, Cuff Size: Adult Regular)   Pulse 76   Resp 16   Wt 85.8 kg (189 lb 1.6 oz)   SpO2 98%   BMI 28.75 kg/m      Physical Exam    Constitutional:       General: He is not in acute distress.     Appearance: Normal appearance. He is not ill-appearing.   HENT:      Head: Normocephalic and atraumatic.      Nose: Nose normal. No congestion or rhinorrhea.       Mouth/Throat:      Mouth: Mucous membranes are moist.      Pharynx: Oropharynx is clear. No posterior oropharyngeal erythema.   Eyes:      General:         Right eye: No discharge.         Left eye: No discharge.   Cardiovascular:      Rate and Rhythm: Normal rate and regular rhythm.      Heart sounds: Normal heart sounds.   Pulmonary:      Effort: Pulmonary effort is normal.      Breath sounds: Normal breath sounds. No wheezing or rhonchi.   Skin:     General: Skin is warm.      Findings: No erythema or rash.   Neurological:      General: No focal deficit present.      Mental Status: He is alert. Mental status is at baseline.   Psychiatric:         Mood and Affect: Mood normal.         Behavior: Behavior normal.        ASSESSMENT/PLAN:  Marco Stovall is a 58 year old male seen today for recent asthma exacerbation with persistent cough, history of recurrent nasal polyps with multiple surgeries, history of aspirin sensitivity and Samter's triad, allergic rhinitis, and use of Biologics with Dupixent in the past which was then not covered by insurance.  Currently does not have a maintenance medication because of insurance coverage and cost of the inhaler.  Has had some recent increase in symptoms.  However it sounds like the last 3 years have been relatively well up until recent asthma flare.    Will treat with a lower dose tapered prednisone course to see if we can help with his persistent asthma exacerbation.  Will also start Trelegy which appears to be covered by insurance.  This will be 1 puff daily.  Singulair was refilled.  Allergy immunotherapy could be considered in the future.  He will likely need to discuss with his primary care provider in regards to his fatigue.  Will check a CBC and differential.  He also had a concern about celiac disease and his daughter has celiac disease.    Trelegy 1 puff daily ([email or web address].com and print coupon).    Singulair 10 mg at night  Flonase 2 sprays each nostril daily  Will  order blood tests  Breathing tests in 2 months  Prednisone taper    Follow-up in 2 months      Thank you for allowing me to participate in the care of Marco Stovall.      I spent 45 minutes on the date of the encounter doing chart review, history and exam, documentation and further coordination as noted above exclusive of separately reported interpretations    Serg Sanchez MD  Allergy/Immunology  [facility]         Again, thank you for allowing me to participate in the care of your patient.        Sincerely,        Serg Sanchez MD

## 2024-02-13 NOTE — NURSING NOTE
Chief Complaint   Patient presents with    New Patient     Severe persistent asthma without complication +3 more         Vitals:    02/13/24 1529   BP: (!) 137/91   BP Location: Left arm   Patient Position: Sitting   Cuff Size: Adult Regular   Pulse: 76   Resp: 16   SpO2: 98%   Weight: 85.8 kg (189 lb 1.6 oz)       Body mass index is 28.75 kg/m .    Jose Bates MA

## 2024-02-16 ENCOUNTER — LAB (OUTPATIENT)
Dept: LAB | Facility: CLINIC | Age: 59
End: 2024-02-16
Payer: COMMERCIAL

## 2024-02-16 DIAGNOSIS — J32.0 CHRONIC MAXILLARY SINUSITIS: ICD-10-CM

## 2024-02-16 DIAGNOSIS — R53.83 OTHER FATIGUE: ICD-10-CM

## 2024-02-16 LAB
BASOPHILS # BLD AUTO: 0.1 10E3/UL (ref 0–0.2)
BASOPHILS NFR BLD AUTO: 1 %
EOSINOPHIL # BLD AUTO: 0.3 10E3/UL (ref 0–0.7)
EOSINOPHIL NFR BLD AUTO: 3 %
ERYTHROCYTE [DISTWIDTH] IN BLOOD BY AUTOMATED COUNT: 13.4 % (ref 10–15)
HCT VFR BLD AUTO: 44 % (ref 40–53)
HGB BLD-MCNC: 14.2 G/DL (ref 13.3–17.7)
IMM GRANULOCYTES # BLD: 0.1 10E3/UL
IMM GRANULOCYTES NFR BLD: 1 %
LYMPHOCYTES # BLD AUTO: 3.8 10E3/UL (ref 0.8–5.3)
LYMPHOCYTES NFR BLD AUTO: 34 %
MCH RBC QN AUTO: 29.7 PG (ref 26.5–33)
MCHC RBC AUTO-ENTMCNC: 32.3 G/DL (ref 31.5–36.5)
MCV RBC AUTO: 92 FL (ref 78–100)
MONOCYTES # BLD AUTO: 0.8 10E3/UL (ref 0–1.3)
MONOCYTES NFR BLD AUTO: 7 %
NEUTROPHILS # BLD AUTO: 6.3 10E3/UL (ref 1.6–8.3)
NEUTROPHILS NFR BLD AUTO: 55 %
PLATELET # BLD AUTO: 210 10E3/UL (ref 150–450)
RBC # BLD AUTO: 4.78 10E6/UL (ref 4.4–5.9)
WBC # BLD AUTO: 11.4 10E3/UL (ref 4–11)

## 2024-02-16 PROCEDURE — 82784 ASSAY IGA/IGD/IGG/IGM EACH: CPT

## 2024-02-16 PROCEDURE — 85025 COMPLETE CBC W/AUTO DIFF WBC: CPT

## 2024-02-16 PROCEDURE — 99000 SPECIMEN HANDLING OFFICE-LAB: CPT

## 2024-02-16 PROCEDURE — 86231 EMA EACH IG CLASS: CPT | Mod: 90

## 2024-02-16 PROCEDURE — 86258 DGP ANTIBODY EACH IG CLASS: CPT

## 2024-02-16 PROCEDURE — 86364 TISS TRNSGLTMNASE EA IG CLAS: CPT

## 2024-02-16 PROCEDURE — 36415 COLL VENOUS BLD VENIPUNCTURE: CPT

## 2024-02-19 LAB
ENDOMYSIUM IGA TITR SER IF: NORMAL {TITER}
IGA SERPL-MCNC: 306 MG/DL (ref 84–499)

## 2024-02-20 LAB
GLIADIN IGA SER-ACNC: 1.7 U/ML
GLIADIN IGG SER-ACNC: <0.6 U/ML
TTG IGA SER-ACNC: 0.7 U/ML
TTG IGG SER-ACNC: 0.7 U/ML

## 2024-03-19 DIAGNOSIS — N52.9 ERECTILE DYSFUNCTION, UNSPECIFIED ERECTILE DYSFUNCTION TYPE: ICD-10-CM

## 2024-03-20 RX ORDER — SILDENAFIL CITRATE 20 MG/1
TABLET ORAL
Qty: 30 TABLET | Refills: 11 | OUTPATIENT
Start: 2024-03-20

## 2024-04-22 ASSESSMENT — ASTHMA QUESTIONNAIRES
QUESTION_1 LAST FOUR WEEKS HOW MUCH OF THE TIME DID YOUR ASTHMA KEEP YOU FROM GETTING AS MUCH DONE AT WORK, SCHOOL OR AT HOME: NONE OF THE TIME
QUESTION_3 LAST FOUR WEEKS HOW OFTEN DID YOUR ASTHMA SYMPTOMS (WHEEZING, COUGHING, SHORTNESS OF BREATH, CHEST TIGHTNESS OR PAIN) WAKE YOU UP AT NIGHT OR EARLIER THAN USUAL IN THE MORNING: ONCE OR TWICE
ACT_TOTALSCORE: 18
ACT_TOTALSCORE: 18
QUESTION_4 LAST FOUR WEEKS HOW OFTEN HAVE YOU USED YOUR RESCUE INHALER OR NEBULIZER MEDICATION (SUCH AS ALBUTEROL): ONE OR TWO TIMES PER DAY
QUESTION_2 LAST FOUR WEEKS HOW OFTEN HAVE YOU HAD SHORTNESS OF BREATH: ONCE OR TWICE A WEEK
QUESTION_5 LAST FOUR WEEKS HOW WOULD YOU RATE YOUR ASTHMA CONTROL: SOMEWHAT CONTROLLED

## 2024-04-26 ENCOUNTER — OFFICE VISIT (OUTPATIENT)
Dept: PULMONOLOGY | Facility: CLINIC | Age: 59
End: 2024-04-26
Attending: INTERNAL MEDICINE
Payer: COMMERCIAL

## 2024-04-26 ENCOUNTER — OFFICE VISIT (OUTPATIENT)
Dept: ALLERGY | Facility: CLINIC | Age: 59
End: 2024-04-26
Attending: INTERNAL MEDICINE
Payer: COMMERCIAL

## 2024-04-26 VITALS
SYSTOLIC BLOOD PRESSURE: 135 MMHG | DIASTOLIC BLOOD PRESSURE: 86 MMHG | OXYGEN SATURATION: 95 % | HEART RATE: 146 BPM | BODY MASS INDEX: 28.74 KG/M2 | WEIGHT: 189 LBS

## 2024-04-26 DIAGNOSIS — J33.9 NASAL POLYPOSIS: ICD-10-CM

## 2024-04-26 DIAGNOSIS — R53.83 OTHER FATIGUE: ICD-10-CM

## 2024-04-26 DIAGNOSIS — J45.50 SEVERE PERSISTENT ASTHMA WITHOUT COMPLICATION (H): Chronic | ICD-10-CM

## 2024-04-26 DIAGNOSIS — J32.0 CHRONIC MAXILLARY SINUSITIS: ICD-10-CM

## 2024-04-26 DIAGNOSIS — R05.3 CHRONIC COUGH: ICD-10-CM

## 2024-04-26 DIAGNOSIS — R00.0 TACHYCARDIA: ICD-10-CM

## 2024-04-26 DIAGNOSIS — J32.4 CHRONIC PANSINUSITIS: ICD-10-CM

## 2024-04-26 LAB — PULMONARY FUNCTION TEST-FENO: 55.5 PPB (ref 0–40)

## 2024-04-26 PROCEDURE — 99215 OFFICE O/P EST HI 40 MIN: CPT | Performed by: INTERNAL MEDICINE

## 2024-04-26 PROCEDURE — 94375 RESPIRATORY FLOW VOLUME LOOP: CPT | Performed by: INTERNAL MEDICINE

## 2024-04-26 PROCEDURE — 95012 NITRIC OXIDE EXP GAS DETER: CPT | Performed by: INTERNAL MEDICINE

## 2024-04-26 RX ORDER — AZITHROMYCIN 250 MG/1
TABLET, FILM COATED ORAL
Qty: 6 TABLET | Refills: 0 | Status: SHIPPED | OUTPATIENT
Start: 2024-04-26 | End: 2024-05-01

## 2024-04-26 RX ORDER — PREDNISONE 20 MG/1
TABLET ORAL
Qty: 15 TABLET | Refills: 0 | Status: SHIPPED | OUTPATIENT
Start: 2024-04-26 | End: 2024-07-16

## 2024-04-26 RX ORDER — BUDESONIDE AND FORMOTEROL FUMARATE DIHYDRATE 160; 4.5 UG/1; UG/1
2 AEROSOL RESPIRATORY (INHALATION) 2 TIMES DAILY
Qty: 10.2 G | Refills: 5 | Status: SHIPPED | OUTPATIENT
Start: 2024-04-26

## 2024-04-26 NOTE — LETTER
4/26/2024         RE: Marco Stovall  [address] [facility] [address]        Dear Colleague,    Thank you for referring your patient, Marco Stovall, to the [facility]. Please see a copy of my visit note below.    Marco Stovall was seen in the Allergy Clinic at [facility].    Marco Stovall is a 58 year old male being seen today for ongoing evaluation of chronic maxillary sinusitis.    Since the last visit the patient has been not well.    He was seen 2/2024 for the first time.  He had worsening of his breathing symptoms since last seen.  He was treated with Trelegy, prednisone, Singulair and a CBC and differential was ordered at that time.  He is not sure this is providing much benefit.  He used to run marathons and 10K's.  Last year he ran a 10K.  Now he cannot exercise.  He has significant shortness of breath as well as cough.    In addition he shows me on his phone how his heart rate has been elevated numerous times.  His heart rate today is 170.    He had pulmonary function testing performed today and had a nebulizer.    He is known to have numerous allergies.    Overall he has concerns about his heart rate and has not notified anyone in regards to his heart rate over the last several months.  He has not been to the emergency department.  He is also having shortness of breath concerns and did improve temporarily on the prednisone but symptoms returned after stopping prednisone.  He is not having an significant sinus congestion but does have a history of significant chronic sinusitis.    PAST ASTHMA HISTORY:    He has severe persistent asthma, recurrent nasal polyps with 5 nasal surgeries, the last being in 2019.    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    He was placed on Dupixent for approximately 1 year in 2020 which worked quite well.  Based on medical chart review,  Dupixent became unavailable because of insurance coverage.      Skin testing was positive to dust mite, grass, trees, weeds and molds in the past.     Latest Reference Range & Units 02/16/24 16:40   WBC 4.0 - 11.0 10e3/uL 11.4 (H)   Hemoglobin 13.3 - 17.7 g/dL 14.2   Hematocrit 40.0 - 53.0 % 44.0   Platelet Count 150 - 450 10e3/uL 210   RBC Count 4.40 - 5.90 10e6/uL 4.78   MCV 78 - 100 fL 92   MCH 26.5 - 33.0 pg 29.7   MCHC 31.5 - 36.5 g/dL 32.3   RDW 10.0 - 15.0 % 13.4   % Neutrophils % 55   % Lymphocytes % 34   % Monocytes % 7   % Eosinophils % 3   % Basophils % 1   Absolute Basophils 0.0 - 0.2 10e3/uL 0.1   Absolute Eosinophils 0.0 - 0.7 10e3/uL 0.3   Absolute Immature Granulocytes <=0.4 10e3/uL 0.1   Absolute Lymphocytes 0.8 - 5.3 10e3/uL 3.8   Absolute Monocytes 0.0 - 1.3 10e3/uL 0.8   % Immature Granulocytes % 1   Absolute Neutrophils 1.6 - 8.3 10e3/uL 6.3   (H): Data is abnormally high    Past Medical History:   Diagnosis Date     Mild intermittent asthma without complication 3/24/2016     Polyp of nasal cavity      Family History   Problem Relation Age of Onset     Hypertension Mother      Thyroid Disease Mother         hypothyroidism     Hypertension Father      Diabetes Maternal Grandfather      Thyroid Disease Daughter         hypothyroidism     Asthma No family hx of      C.A.D. No family hx of      Cerebrovascular Disease No family hx of      Breast Cancer No family hx of      Cancer - colorectal No family hx of      Prostate Cancer No family hx of      Past Surgical History:   Procedure Laterality Date     ENT SURGERY       ETHMOIDECTOMY  6/16/2014    Procedure: ETHMOIDECTOMY;  Surgeon: Jimbo Yang MD;  Location: [facility] OR     HERNIORRHAPHY UMBILICAL N/A 11/21/2022    Procedure: HERNIORRHAPHY, UMBILICAL, OPEN;  Surgeon: Denise Espinoza MD;  Location:  OR     Livestation SYSTEM ENDOSCOPIC SINUS SURGERY Bilateral 12/3/2019    Procedure: Bilateral endoscopic maxillary antrostomies with  tissue removal, bilateral endoscopic total ethmoidectomies, bilateral endoscopic sphenoidotomies, bilateral endoscopic frontal sinusotomies, bilateral endoscopic nasal polypectomy, image guidance;  Surgeon: Josh Swartz MD;  Location: [facility] OR     SURGICAL HISTORY OF -   2010    Nasal Polyp; 3 total surgeries: 1997         Current Outpatient Medications:      albuterol (PROAIR HFA/PROVENTIL HFA/VENTOLIN HFA) 108 (90 Base) MCG/ACT inhaler, Inhale 1-2 puffs into the lungs every 4 hours as needed for shortness of breath, wheezing or other (persistent cough) Please follow up in clinic for next refill., Disp: 18 g, Rfl: 0     azithromycin (ZITHROMAX) 250 MG tablet, Take 2 tablets (500 mg) by mouth daily for 1 day, THEN 1 tablet (250 mg) daily for 4 days., Disp: 6 tablet, Rfl: 0     azithromycin (ZITHROMAX) 250 MG tablet, 2 tablets the first day, then 1 tablet daily for the next 4 days, Disp: 6 tablet, Rfl: 0     budesonide-formoterol (SYMBICORT) 160-4.5 MCG/ACT Inhaler, Inhale 2 puffs into the lungs 2 times daily, Disp: 10.2 g, Rfl: 5     cyclobenzaprine (FLEXERIL) 5 MG tablet, Take 1 tablet (5 mg) by mouth 3 times daily as needed for muscle spasms, Disp: 6 tablet, Rfl: 1     dupilumab (DUPIXENT) 300 MG/2ML prefilled pen, Inject 2 mLs (300 mg) Subcutaneous every 14 days for 336 days, Disp: 4 mL, Rfl: 11     fluticasone (FLONASE) 50 MCG/ACT nasal spray, Spray 2 sprays into both nostrils daily, Disp: 16 g, Rfl: 4     Fluticasone-Umeclidin-Vilant (TRELEGY ELLIPTA) 100-62.5-25 MCG/ACT oral inhaler, Inhale 1 puff into the lungs daily, Disp: 1 each, Rfl: 4     fluticasone-vilanterol (BREO ELLIPTA) 200-25 MCG/INH inhaler, Inhale 1 puff into the lungs daily, Disp: 60 each, Rfl: 5     montelukast (SINGULAIR) 10 MG tablet, Take 1 tablet (10 mg) by mouth at bedtime, Disp: 90 tablet, Rfl: 3     Multiple Vitamin (MULTIVITAMIN) per tablet, Take 1 tablet by mouth daily, Disp: 100 tablet, Rfl: 12     predniSONE (DELTASONE) 10 MG tablet,  Take 3 tabs daily x 4 days, 2 tabs daily x 4 days, and 1 tab daily x 4 days, Disp: 24 tablet, Rfl: 0     predniSONE (DELTASONE) 20 MG tablet, Take 2 tabs daily x 5 days and then 1 tab daily x 5 days, Disp: 15 tablet, Rfl: 0     sildenafil (REVATIO) 20 MG tablet, Take 1-5 tablets 30-60 min before intimacy, Disp: 30 tablet, Rfl: 11  Allergies   Allergen Reactions     Advil [Ibuprofen Micronized] Other (See Comments)     Tight chest     Asa [Aspirin] Nausea and Vomiting     Asthma - tight chest     Hazelnuts [Nuts] Other (See Comments)     Chest tight. Positive SPT. Tolerates other tree nuts and peanut.     Naprosyn [Naproxen] GI Disturbance     Stomach      Tylenol [Acetaminophen] Nausea         EXAM:   /86   Pulse (!) 146   Wt 85.7 kg (189 lb)   SpO2 95%   BMI 28.74 kg/m      Constitutional:       General: He is not in acute distress.     Appearance: Normal appearance. He is not ill-appearing.   HENT:      Head: Normocephalic and atraumatic.      Nose: Evidence of turbinate hypertrophy and what appears to be nasal polyps bilaterally.     Mouth/Throat:      Mouth: Mucous membranes are moist.      Pharynx: Oropharynx is clear. No posterior oropharyngeal erythema.   Eyes:      General:         Right eye: No discharge.         Left eye: No discharge.   Cardiovascular:      Rate and Rhythm: Normal rate and regular rhythm.      Heart sounds: Normal heart sounds.   Pulmonary:      Effort: Pulmonary effort is normal.      Breath sounds: Normal breath sounds. No wheezing or rhonchi.   Skin:     General: Skin is warm.      Findings: No erythema or rash.   Neurological:      General: No focal deficit present.      Mental Status: He is alert. Mental status is at baseline.   Psychiatric:         Mood and Affect: Mood normal.         Behavior: Behavior normal.      Spirometry showed moderate obstruction with an FEV1 of 56% which did not reverse after albuterol.  FVC was 79%.  This is a reduction since 2019 when his FEV1  was 75% predicted.  His exhaled nitric oxide was also elevated at 56.    ASSESSMENT/PLAN:  Marco Stovall is a 58 year old male seen today for severe persistent asthma, AERD, history of chronic sinusitis with nasal polyps, and significant tachycardia with irregular heart rate consistent with A-fib.    While in the appointment today his heart rate got up to 175 and was initially regular in rate and then while listening to his heart became irregular.  Within 2 minutes the heart rate dropped to 75.    He does not feel that Trelegy is providing benefit.  That will be discontinued.  Will switch to Symbicort..  Will start Dupixent.      I was going to send him to the emergency department due to his significantly elevated elevated heart rate with possible atrial fibrillation.  However during the appointment his heart rate normalized and appears this has been going on for several months.  Will place an urgent referral to cardiology and also refer to internal medicine.    Stop Trelegy  Referral to Cardiology due to tachycardia and internal medicine for obtaining a primary care provider   Prednisone 40 mg daily x 5 days, then 20 mg x 5 days due to significant obstruction on spirometry with shortness of breath.  Azithromycin x 5 days due to history of chronic sinusitis and worsening of his asthma.  Symbicort 2 puffs twice daily  Chest xray  Sinus CT to evaluate for evidence of chronic sinusitis which has been recurrent in the past.  This may be contributing to his significant asthma deterioration.  Dupixent 300 mg q 14 days  Go to Emerrgency department if your heart rate increases to 150 or higher again  We will consider a chest CT scan depending on results of the above.    Follow-up in 2 months      Thank you for allowing me to participate in the care of Marco Stovall.      I spent 72 minutes on the date of the encounter doing chart review, history and exam, documentation and further coordination as noted above  exclusive of separately reported interpretations    Serg Sanchez MD  Allergy/Immunology  [facility]      Again, thank you for allowing me to participate in the care of your patient.        Sincerely,        Serg Sanchez MD

## 2024-04-26 NOTE — PROGRESS NOTES
Marco Stovall comes into clinic today at the request of Dr. Sanchez, Ordering Provider for spirometry  and FENO.    This service provided today was under the supervising provider of the day Dr. Sanchez, who was available if needed.    Joseph Thomas, RT

## 2024-04-26 NOTE — PROGRESS NOTES
Marco Stovall was seen in the Allergy Clinic at [facility].    Marco Stovall is a 58 year old male being seen today for ongoing evaluation of chronic maxillary sinusitis.    Since the last visit the patient has been not well.    He was seen 2/2024 for the first time.  He had worsening of his breathing symptoms since last seen.  He was treated with Trelegy, prednisone, Singulair and a CBC and differential was ordered at that time.  He is not sure this is providing much benefit.  He used to run marathons and 10K's.  Last year he ran a 10K.  Now he cannot exercise.  He has significant shortness of breath as well as cough.    In addition he shows me on his phone how his heart rate has been elevated numerous times.  His heart rate today is 170.    He had pulmonary function testing performed today and had a nebulizer.    He is known to have numerous allergies.    Overall he has concerns about his heart rate and has not notified anyone in regards to his heart rate over the last several months.  He has not been to the emergency department.  He is also having shortness of breath concerns and did improve temporarily on the prednisone but symptoms returned after stopping prednisone.  He is not having an significant sinus congestion but does have a history of significant chronic sinusitis.    PAST ASTHMA HISTORY:    He has severe persistent asthma, recurrent nasal polyps with 5 nasal surgeries, the last being in 2019.    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    He was placed on Dupixent for approximately 1 year in 2020 which worked quite well.  Based on medical chart review, Dupixent became unavailable because of insurance coverage.      Skin testing was positive to dust mite, grass, trees, weeds and molds in the past.     Latest Reference Range & Units 02/16/24 16:40   WBC 4.0 - 11.0 10e3/uL 11.4 (H)   Hemoglobin 13.3 - 17.7 g/dL 14.2    Hematocrit 40.0 - 53.0 % 44.0   Platelet Count 150 - 450 10e3/uL 210   RBC Count 4.40 - 5.90 10e6/uL 4.78   MCV 78 - 100 fL 92   MCH 26.5 - 33.0 pg 29.7   MCHC 31.5 - 36.5 g/dL 32.3   RDW 10.0 - 15.0 % 13.4   % Neutrophils % 55   % Lymphocytes % 34   % Monocytes % 7   % Eosinophils % 3   % Basophils % 1   Absolute Basophils 0.0 - 0.2 10e3/uL 0.1   Absolute Eosinophils 0.0 - 0.7 10e3/uL 0.3   Absolute Immature Granulocytes <=0.4 10e3/uL 0.1   Absolute Lymphocytes 0.8 - 5.3 10e3/uL 3.8   Absolute Monocytes 0.0 - 1.3 10e3/uL 0.8   % Immature Granulocytes % 1   Absolute Neutrophils 1.6 - 8.3 10e3/uL 6.3   (H): Data is abnormally high    Past Medical History:   Diagnosis Date    Mild intermittent asthma without complication 3/24/2016    Polyp of nasal cavity      Family History   Problem Relation Age of Onset    Hypertension Mother     Thyroid Disease Mother         hypothyroidism    Hypertension Father     Diabetes Maternal Grandfather     Thyroid Disease Daughter         hypothyroidism    Asthma No family hx of     C.A.D. No family hx of     Cerebrovascular Disease No family hx of     Breast Cancer No family hx of     Cancer - colorectal No family hx of     Prostate Cancer No family hx of      Past Surgical History:   Procedure Laterality Date    ENT SURGERY      ETHMOIDECTOMY  6/16/2014    Procedure: ETHMOIDECTOMY;  Surgeon: Jimbo Yang MD;  Location: [facility] OR    HERNIORRHAPHY UMBILICAL N/A 11/21/2022    Procedure: HERNIORRHAPHY, UMBILICAL, OPEN;  Surgeon: Denise Espinoza MD;  Location:  OR    OPTICAL OPHTHONIX SYSTEM ENDOSCOPIC SINUS SURGERY Bilateral 12/3/2019    Procedure: Bilateral endoscopic maxillary antrostomies with tissue removal, bilateral endoscopic total ethmoidectomies, bilateral endoscopic sphenoidotomies, bilateral endoscopic frontal sinusotomies, bilateral endoscopic nasal polypectomy, image guidance;  Surgeon: Josh Swartz MD;  Location:  OR    SURGICAL HISTORY OF -   2010    Nasal  Polyp; 3 total surgeries: 1997         Current Outpatient Medications:     albuterol (PROAIR HFA/PROVENTIL HFA/VENTOLIN HFA) 108 (90 Base) MCG/ACT inhaler, Inhale 1-2 puffs into the lungs every 4 hours as needed for shortness of breath, wheezing or other (persistent cough) Please follow up in clinic for next refill., Disp: 18 g, Rfl: 0    azithromycin (ZITHROMAX) 250 MG tablet, Take 2 tablets (500 mg) by mouth daily for 1 day, THEN 1 tablet (250 mg) daily for 4 days., Disp: 6 tablet, Rfl: 0    azithromycin (ZITHROMAX) 250 MG tablet, 2 tablets the first day, then 1 tablet daily for the next 4 days, Disp: 6 tablet, Rfl: 0    budesonide-formoterol (SYMBICORT) 160-4.5 MCG/ACT Inhaler, Inhale 2 puffs into the lungs 2 times daily, Disp: 10.2 g, Rfl: 5    cyclobenzaprine (FLEXERIL) 5 MG tablet, Take 1 tablet (5 mg) by mouth 3 times daily as needed for muscle spasms, Disp: 6 tablet, Rfl: 1    dupilumab (DUPIXENT) 300 MG/2ML prefilled pen, Inject 2 mLs (300 mg) Subcutaneous every 14 days for 336 days, Disp: 4 mL, Rfl: 11    fluticasone (FLONASE) 50 MCG/ACT nasal spray, Spray 2 sprays into both nostrils daily, Disp: 16 g, Rfl: 4    Fluticasone-Umeclidin-Vilant (TRELEGY ELLIPTA) 100-62.5-25 MCG/ACT oral inhaler, Inhale 1 puff into the lungs daily, Disp: 1 each, Rfl: 4    fluticasone-vilanterol (BREO ELLIPTA) 200-25 MCG/INH inhaler, Inhale 1 puff into the lungs daily, Disp: 60 each, Rfl: 5    montelukast (SINGULAIR) 10 MG tablet, Take 1 tablet (10 mg) by mouth at bedtime, Disp: 90 tablet, Rfl: 3    Multiple Vitamin (MULTIVITAMIN) per tablet, Take 1 tablet by mouth daily, Disp: 100 tablet, Rfl: 12    predniSONE (DELTASONE) 10 MG tablet, Take 3 tabs daily x 4 days, 2 tabs daily x 4 days, and 1 tab daily x 4 days, Disp: 24 tablet, Rfl: 0    predniSONE (DELTASONE) 20 MG tablet, Take 2 tabs daily x 5 days and then 1 tab daily x 5 days, Disp: 15 tablet, Rfl: 0    sildenafil (REVATIO) 20 MG tablet, Take 1-5 tablets 30-60 min before  intimacy, Disp: 30 tablet, Rfl: 11  Allergies   Allergen Reactions    Advil [Ibuprofen Micronized] Other (See Comments)     Tight chest    Asa [Aspirin] Nausea and Vomiting     Asthma - tight chest    Hazelnuts [Nuts] Other (See Comments)     Chest tight. Positive SPT. Tolerates other tree nuts and peanut.    Naprosyn [Naproxen] GI Disturbance     Stomach     Tylenol [Acetaminophen] Nausea         EXAM:   /86   Pulse (!) 146   Wt 85.7 kg (189 lb)   SpO2 95%   BMI 28.74 kg/m      Constitutional:       General: He is not in acute distress.     Appearance: Normal appearance. He is not ill-appearing.   HENT:      Head: Normocephalic and atraumatic.      Nose: Evidence of turbinate hypertrophy and what appears to be nasal polyps bilaterally.     Mouth/Throat:      Mouth: Mucous membranes are moist.      Pharynx: Oropharynx is clear. No posterior oropharyngeal erythema.   Eyes:      General:         Right eye: No discharge.         Left eye: No discharge.   Cardiovascular:      Rate and Rhythm: Normal rate and regular rhythm.      Heart sounds: Normal heart sounds.   Pulmonary:      Effort: Pulmonary effort is normal.      Breath sounds: Normal breath sounds. No wheezing or rhonchi.   Skin:     General: Skin is warm.      Findings: No erythema or rash.   Neurological:      General: No focal deficit present.      Mental Status: He is alert. Mental status is at baseline.   Psychiatric:         Mood and Affect: Mood normal.         Behavior: Behavior normal.      Spirometry showed moderate obstruction with an FEV1 of 56% which did not reverse after albuterol.  FVC was 79%.  This is a reduction since 2019 when his FEV1 was 75% predicted.  His exhaled nitric oxide was also elevated at 56.    ASSESSMENT/PLAN:  Marco Stovall is a 58 year old male seen today for severe persistent asthma, AERD, history of chronic sinusitis with nasal polyps, and significant tachycardia with irregular heart rate consistent with  A-fib.    While in the appointment today his heart rate got up to 175 and was initially regular in rate and then while listening to his heart became irregular.  Within 2 minutes the heart rate dropped to 75.    He does not feel that Trelegy is providing benefit.  That will be discontinued.  Will switch to Symbicort..  Will start Dupixent.      I was going to send him to the emergency department due to his significantly elevated elevated heart rate with possible atrial fibrillation.  However during the appointment his heart rate normalized and appears this has been going on for several months.  Will place an urgent referral to cardiology and also refer to internal medicine.    Stop Trelegy  Referral to Cardiology due to tachycardia and internal medicine for obtaining a primary care provider   Prednisone 40 mg daily x 5 days, then 20 mg x 5 days due to significant obstruction on spirometry with shortness of breath.  Azithromycin x 5 days due to history of chronic sinusitis and worsening of his asthma.  Symbicort 2 puffs twice daily  Chest xray  Sinus CT to evaluate for evidence of chronic sinusitis which has been recurrent in the past.  This may be contributing to his significant asthma deterioration.  Dupixent 300 mg q 14 days  Go to Emerrgency department if your heart rate increases to 150 or higher again  We will consider a chest CT scan depending on results of the above.    Follow-up in 2 months      Thank you for allowing me to participate in the care of Marco Stovall.      I spent 72 minutes on the date of the encounter doing chart review, history and exam, documentation and further coordination as noted above exclusive of separately reported interpretations    Serg Sanchez MD  Allergy/Immunology  [facility]

## 2024-04-26 NOTE — LETTER
4/26/2024         RE: Marco Stovall  [address]  [place] [address]        Dear Colleague,    Thank you for referring your patient, Marco Stovall, to the [facility] [place]. Please see a copy of my visit note below.    Marco Stovall comes into clinic today at the request of Dr. Sanchez, Ordering Provider for spirometry  and FENO.    This service provided today was under the supervising provider of the day Dr. Sanchez, who was available if needed.    Joseph Thomas, RT       Again, thank you for allowing me to participate in the care of your patient.        Sincerely,        [facility]

## 2024-04-26 NOTE — PATIENT INSTRUCTIONS
Stop Trelegy  Referral to Cardiology and Internal Medicine for tachycardia  Prednisone 40 mg daily x 5 days, then 20 mg x 5 days  Azithromycin x 5 days  Symbicort 2 puffs twice daily  Chest xray  Sinus CT  Dupixent 300 mg q 14 days  Go to Emerrgency department if your heart rate increases to 150 or higher again  We will consider a chest CT scan depending on results of the above.

## 2024-04-28 LAB
EXPTIME-PRE: 9.95 SEC
FEF2575-%PRED-POST: 30 %
FEF2575-%PRED-PRE: 23 %
FEF2575-POST: 0.86 L/SEC
FEF2575-PRE: 0.66 L/SEC
FEF2575-PRED: 2.83 L/SEC
FEFMAX-%PRED-PRE: 65 %
FEFMAX-PRE: 5.9 L/SEC
FEFMAX-PRED: 8.95 L/SEC
FEV1-%PRED-PRE: 56 %
FEV1-PRE: 1.82 L
FEV1FEV6-PRE: 61 %
FEV1FEV6-PRED: 79 %
FEV1FVC-PRE: 57 %
FEV1FVC-PRED: 79 %
FIFMAX-PRE: 6.14 L/SEC
FVC-%PRED-PRE: 79 %
FVC-PRE: 3.23 L
FVC-PRED: 4.07 L

## 2024-04-29 ENCOUNTER — TELEPHONE (OUTPATIENT)
Dept: ALLERGY | Facility: CLINIC | Age: 59
End: 2024-04-29
Payer: COMMERCIAL

## 2024-04-29 NOTE — TELEPHONE ENCOUNTER
PA Initiation    Medication: XOLAIR 300 MG/2ML SC SOAJ  Insurance Company:    Pharmacy Filling the Rx: [facility] [address]  Filling Pharmacy Phone:    Filling Pharmacy Fax:    Start Date: 4/29/2024  Prior Authorization Approval    Medication: XOLAIR 300 MG/2ML SC SOAJ  Authorization Effective Date: 4/29/2024  Authorization Expiration Date: 10/26/2024  Approved Dose/Quantity: 4  Reference #: [id number]   Insurance Company: [employer] - Phone [phone] Fax [phone]  Expected CoPay: $    CoPay Card Available: Yes    Financial Assistance Needed: copay card eligible  Which Pharmacy is filling the prescription: [facility] [address]          MEDARDO Matta, [facility]  Specialty Pharmacy Clinic Liaison     [facility]    [email or web address]     Phone: [phone]  Fax: [phone]

## 2024-05-06 ENCOUNTER — DOCUMENTATION ONLY (OUTPATIENT)
Dept: ALLERGY | Facility: CLINIC | Age: 59
End: 2024-05-06
Payer: COMMERCIAL

## 2024-05-06 NOTE — TELEPHONE ENCOUNTER
Prior Authorization Approval    Medication: DUPIXENT 300 MG/2ML SC SOPN  Authorization Effective Date: 4/29/2024  Authorization Expiration Date: 10/26/2024          MEDARDO Matta, [facility]  Specialty Pharmacy Clinic Liaison     [facility]    [email or web address]     Phone: [phone]  Fax: [phone]

## 2024-05-06 NOTE — PROGRESS NOTES
Marco was called and a message left as it went straight to voicemail.  I received a note that the cardiology team was unable to get a hold of him for his urgent appointment.  I called to let him know about this and my concerns in regards to his fast heart rate and how this is critically important.

## 2024-05-07 ENCOUNTER — ANCILLARY PROCEDURE (OUTPATIENT)
Dept: GENERAL RADIOLOGY | Facility: CLINIC | Age: 59
End: 2024-05-07
Attending: INTERNAL MEDICINE
Payer: COMMERCIAL

## 2024-05-07 ENCOUNTER — ANCILLARY PROCEDURE (OUTPATIENT)
Dept: CT IMAGING | Facility: CLINIC | Age: 59
End: 2024-05-07
Attending: INTERNAL MEDICINE
Payer: COMMERCIAL

## 2024-05-07 DIAGNOSIS — J32.4 CHRONIC PANSINUSITIS: ICD-10-CM

## 2024-05-07 DIAGNOSIS — J45.50 SEVERE PERSISTENT ASTHMA WITHOUT COMPLICATION (H): ICD-10-CM

## 2024-05-07 DIAGNOSIS — R05.3 CHRONIC COUGH: ICD-10-CM

## 2024-05-07 PROCEDURE — 70486 CT MAXILLOFACIAL W/O DYE: CPT | Performed by: RADIOLOGY

## 2024-05-07 PROCEDURE — 71046 X-RAY EXAM CHEST 2 VIEWS: CPT | Mod: GC | Performed by: RADIOLOGY

## 2024-06-04 NOTE — PROGRESS NOTES
Service Date: 2024    Clinic - [facility]  [address] [place] [address]     RE:  Marco Stovall   MRN:  [id number]   :  1965     To Whom It May Concern    It was a pleasure participating in the care of your patient, Marco Stovall .  As you know, he is a 58 year old year old person who I met in person today for rapid palpitations, blood pressure control, hyperlipidemia.    Past medical history is significant for the followin.  Hyperlipidemia  2.  Umbilical hernia  3.  Sinusitis  4.  Asthma/seasonal allergies, PFTs 2024 revealed an FEV1 of 1.8, FVC of 3.2 corresponding to 56 and 79% predicted, DLCO not performed  5.  Aspirin sensitivity  6.  Genital herpes  7.  Samter's triad.  (Nasal polyposis, asthma, NSAID intolerance)    Patient does not have a documented history of cardiac disease    Patient saw Serg Sanchez MD his allergist on 2024, and during the appointment his heart rate was anywhere from 146 to 170 bpm.  He was to be sent to the emergency department, however his heart rate normalized to the 70s, and he was treated with a prednisone taper, azithromycin and a cardiology referral.    He presents today for further evaluation and treatment    From a clinical standpoint, currently since starting Trileptal on 2024, the patient has noticed some rapid palpitations particularly from his Apple Watch.  He does not feel dizzy or lightheaded and he has not had any syncope or near syncope however he will occasionally feel his heart beating rapidly.  He says it last for about a minute and happens about 3 times a week nothing seems to make it better or worse.  He is now off the Trileptal and started Dupixent recently.    The patient otherwise denies gross chest pain, shortness of breath, PND, orthopnea, edema, palpitations, syncope or near syncope.    Cardiac risk factors: No history of diabetes, no history of hypertension, no history of drug use,  drinks 1 drink a night, 2 cups coffee in the morning, no history of smoking, no family history of heart disease, he does have hyperlipidemia.    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  Albuterol  2.  Azithromycin  3.  Symbicort  4.  Dupixent  5.  Flonase  6.  Singulair  7.  Prednisone taper  8.  Sildenafil    PHYSICAL EXAM:    Vitals: 4/26/2024 135/86, weight 189 pounds  General:  Patient appears comfortable, well groomed  Psych:  Patient is alert and oriented X 3  Eyes:  No gross erythema, exudate  Neck:  JVP: Normal  Pulm:  CTA scattered wheezes expiratory  CV: Regular rate and rhythm no gross murmur or gallop appreciated  Abdomen:  soft, non tender, positive bowel sounds  Lower extremities: No edema      LABS:    2/16/2024: Hemoglobin 14.2  11/9/2022: Potassium 4.1 GFR 78    EKG 11/18/2019 reveals sinus bradycardia at a rate of 53 bpm, PVC, left axis deviation, no significant ST changes    IMPRESSION:    Marco is a 58-year-old gentleman whose past medical history significant for asthma/seasonal allergies, aspirin sensitivity, Samter's triad (nasal polyposis, asthma, NSAID intolerance) who presents with several active issues:    1.  Rapid palpitations of unclear etiology    Patient apparently presented to his allergists office with a heart rate of 170 bpm.  Fortunately the rhythm resolved during the visit, prior to having to present to the emergency department.    Since then, from a clinical standpoint, he has had rapid palpitations for about a minute 3 times a week.  His Apple Watch has recorded heart rates up to the 170 to 200 bpm range.  It is unclear if this is diagnostic or accurate, further noninvasive evaluation would be  indicated.    2.  Blood pressure control    Blood pressures currently running in the normal range of 120 mmHg or below at home, continue to follow    3.  Hyperlipidemia    Patient is due for fasting lipid profile      PLAN:    1.  14-day Zio patch monitor in order to rule out significant malignant arrhythmias as a cause for symptoms    2.  Echocardiogram to rule out significant structural pathology as cause for symptoms    3.  Routine lab work including CBC BMP, TSH and fasting lipids    4.  Further updates to follow pending above test results.  Note that we may consider diltiazem instead of beta-blockers in the future for treatment pending results of diagnostic testing in the context of his severe asthma if needed.    Once again, it was a pleasure participating in the care of your patient, Marco Stovall .  Please feel free to contact me at any time if there are any questions regarding his care in the future.      Sincerely,          Francisco Davis M.D.  Cardiovascular Division  [facility]      Total time:  Including pre-visit chart review, in person face to face visit, post visit charting time as well as time for coordination of care:  58min      Addendum 7/2/24:    Ziopatch monitor reveals that patient's symptoms correspond to episodes of sustained and non-sustained SVT  Fastest 1min 58 sec at 214bpm, longest 10 min16 sec at 154 bpm however the great majority of the recorded 365 episodes were asymptomatic.  Two of the three triggered episodes that the patient reported were associated with SVT.    2 runs of NSVT noted up to 11 beats, asymptomatic    Rare ectopy (PAC/PVCs)  of low burden incidentally noted (<1% respectively).      Impression:      Symptomatic SVT    Plan:     EP referral this week to discuss more definitive therapies (will not start medical therapy at this time due to low baseline HR and BP)      Addendum 7/4/24:    Echo results reveal normal LV systolic function without gross  valvular pathology    Lipids:  tchol 249,     No obvious calcifications noted on prior CT sinus scans    Plan:     Lifestyle modification with Mediterranean diet, wt loss, recheck fasting lipids 6mo.  If no improvement, can consider pharmacologic therapy if needed

## 2024-06-12 ENCOUNTER — OFFICE VISIT (OUTPATIENT)
Dept: CARDIOLOGY | Facility: CLINIC | Age: 59
End: 2024-06-12
Attending: INTERNAL MEDICINE
Payer: COMMERCIAL

## 2024-06-12 VITALS
BODY MASS INDEX: 30.52 KG/M2 | OXYGEN SATURATION: 98 % | DIASTOLIC BLOOD PRESSURE: 92 MMHG | WEIGHT: 200.7 LBS | HEART RATE: 61 BPM | SYSTOLIC BLOOD PRESSURE: 144 MMHG

## 2024-06-12 DIAGNOSIS — R00.0 TACHYCARDIA: ICD-10-CM

## 2024-06-12 PROCEDURE — 93000 ELECTROCARDIOGRAM COMPLETE: CPT | Mod: XU | Performed by: INTERNAL MEDICINE

## 2024-06-12 PROCEDURE — 99204 OFFICE O/P NEW MOD 45 MIN: CPT | Mod: 25 | Performed by: INTERNAL MEDICINE

## 2024-06-12 PROCEDURE — 93246 EXT ECG>7D<15D RECORDING: CPT | Performed by: INTERNAL MEDICINE

## 2024-06-12 ASSESSMENT — PAIN SCALES - GENERAL: PAINLEVEL: NO PAIN (0)

## 2024-06-12 NOTE — NURSING NOTE
Marco Stovall arrived here on 6/12/2024 3:38 PM for 8-14 Days  Zio monitor placement per ordering provider Dr. Davis for the diagnosis tachycardia. Patient s skin was prepped per protocol. Zio monitor was placed.  Instructions were reviewed with and given to the patient. Patient verbalized understanding of wear, troubleshooting and monitor return instructions.

## 2024-06-12 NOTE — NURSING NOTE
"Chief Complaint   Patient presents with    New Patient       Initial BP (!) 144/92 (BP Location: Left arm, Patient Position: Sitting, Cuff Size: Adult Large)   Pulse 61   Wt 91 kg (200 lb 11.2 oz)   SpO2 98%   BMI 30.52 kg/m   Estimated body mass index is 30.52 kg/m  as calculated from the following:    Height as of 11/9/22: 1.727 m (5' 8\").    Weight as of this encounter: 91 kg (200 lb 11.2 oz)..  BP completed using cuff size: jaspreet High VF  "

## 2024-06-12 NOTE — PATIENT INSTRUCTIONS
Take your medicines every day, as directed     Changes made today:  14 day ziopatch to assess rhythm   Echo   Fasting labs        Cardiology Care Coordinators:      Awa CULP RN     Cardiology Rooming staff:  Trina DA SILVA CNA    Phone  [phone]      Fax [phone]    To Contact us     During Business Hours:  [phone]     If you are needing refills please contact your pharmacy.     For urgent after hour care please call the [facility] at [phone] or the [facility] at [phone] and ask to speak to the cardiologist on call.            HOW TO CHECK YOUR BLOOD PRESSURE AT HOME:     Avoid eating, smoking, and exercising for at least 30 minutes before taking a reading.     Be sure you have taken your BP medication at least 2-3 hours before you check it.      Sit quietly for 10 minutes before a reading.      Sit in a chair with your feet flat on the floor. Rest your  arm on a table so that the arm cuff is at the same level as your heart.     Remain still during the reading.  Record your blood pressure and pulse in a log and bring to your next appointment.       Use [employer] allows you to communicate directly with your heart team through secure messaging.  [employer] can be accessed any time on your phone, computer, or tablet.  If you need assistance, we'd be happy to help!             Keep your Heart Appointments:     Follow-up pending results

## 2024-06-13 LAB
ATRIAL RATE - MUSE: 63 BPM
DIASTOLIC BLOOD PRESSURE - MUSE: NORMAL MMHG
INTERPRETATION ECG - MUSE: NORMAL
P AXIS - MUSE: -1 DEGREES
PR INTERVAL - MUSE: 168 MS
QRS DURATION - MUSE: 88 MS
QT - MUSE: 436 MS
QTC - MUSE: 446 MS
R AXIS - MUSE: -19 DEGREES
SYSTOLIC BLOOD PRESSURE - MUSE: NORMAL MMHG
T AXIS - MUSE: -5 DEGREES
VENTRICULAR RATE- MUSE: 63 BPM

## 2024-06-16 ENCOUNTER — HEALTH MAINTENANCE LETTER (OUTPATIENT)
Age: 59
End: 2024-06-16

## 2024-06-27 ENCOUNTER — OFFICE VISIT (OUTPATIENT)
Dept: ALLERGY | Facility: CLINIC | Age: 59
End: 2024-06-27
Payer: COMMERCIAL

## 2024-06-27 VITALS
BODY MASS INDEX: 30.5 KG/M2 | WEIGHT: 200.62 LBS | OXYGEN SATURATION: 98 % | HEART RATE: 67 BPM | SYSTOLIC BLOOD PRESSURE: 133 MMHG | DIASTOLIC BLOOD PRESSURE: 84 MMHG

## 2024-06-27 DIAGNOSIS — J33.9 NASAL POLYPOSIS: ICD-10-CM

## 2024-06-27 DIAGNOSIS — Z88.6 SAMTER'S TRIAD: ICD-10-CM

## 2024-06-27 DIAGNOSIS — J33.9 SAMTER'S TRIAD: ICD-10-CM

## 2024-06-27 DIAGNOSIS — J45.50 SEVERE PERSISTENT ASTHMA WITHOUT COMPLICATION (H): Primary | ICD-10-CM

## 2024-06-27 DIAGNOSIS — J32.0 CHRONIC MAXILLARY SINUSITIS: ICD-10-CM

## 2024-06-27 DIAGNOSIS — J45.909 SAMTER'S TRIAD: ICD-10-CM

## 2024-06-27 PROCEDURE — 99214 OFFICE O/P EST MOD 30 MIN: CPT | Performed by: INTERNAL MEDICINE

## 2024-06-27 ASSESSMENT — ASTHMA QUESTIONNAIRES
QUESTION_5 LAST FOUR WEEKS HOW WOULD YOU RATE YOUR ASTHMA CONTROL: WELL CONTROLLED
QUESTION_2 LAST FOUR WEEKS HOW OFTEN HAVE YOU HAD SHORTNESS OF BREATH: NOT AT ALL
QUESTION_1 LAST FOUR WEEKS HOW MUCH OF THE TIME DID YOUR ASTHMA KEEP YOU FROM GETTING AS MUCH DONE AT WORK, SCHOOL OR AT HOME: NONE OF THE TIME
QUESTION_4 LAST FOUR WEEKS HOW OFTEN HAVE YOU USED YOUR RESCUE INHALER OR NEBULIZER MEDICATION (SUCH AS ALBUTEROL): ONCE A WEEK OR LESS
ACT_TOTALSCORE: 23
ACT_TOTALSCORE: 23
QUESTION_3 LAST FOUR WEEKS HOW OFTEN DID YOUR ASTHMA SYMPTOMS (WHEEZING, COUGHING, SHORTNESS OF BREATH, CHEST TIGHTNESS OR PAIN) WAKE YOU UP AT NIGHT OR EARLIER THAN USUAL IN THE MORNING: NOT AT ALL

## 2024-06-27 NOTE — PATIENT INSTRUCTIONS
Symbicort 2 puffs twice daily  Dupixent 300 mg q 14 days          Allergy Staff Appt Hours Shot Hours Location       Physician   Serg Sanchez MD      Support Staff   IVETH Denise RN, MA Emily J., MA      Mondays Tuesdays Thursdays and Fridays:      [place] 7-5 Wednesdays         Close                Mondays, Tuesdays and Fridays:  7:20 - 3:40              [facility]  [address]  [place] [address]  Allergy appointment  line: [phone]    Pulmonary Function Scheduling:  [place]: [phone]

## 2024-06-27 NOTE — LETTER
6/27/2024      Marco Stovall  [address] [facility] [address]      Dear Colleague,    Thank you for referring your patient, Marco Stovall, to the [facility]. Please see a copy of my visit note below.    Marco Stovall was seen in the Allergy Clinic at [facility].    Marco Stovall is a 58 year old male being seen today for ongoing evaluation of Chronic maxillary sinusitis, tachycardia, and asthma.  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    Since the last visit the patient has been feeling better.    At the last appointment he was having problems with shortness of breath as well as cough and tachycardia.  He was referred to cardiology.  He had a Zio patch placed recently and an echocardiogram.  He feels like his tachycardia worsened on Trelegy.  He is no longer on Trelegy.  He is now on Symbicort 2 puffs twice daily.  He was also started on Dupixent.  He was on this in the past.  He has received 2 shots.  He has had significant improvement.  He is approximately 80% better in regards to his sinuses and asthma.  Recent sinus CT scan performed at the last appointment did show significant sinus disease.    At the last appointment his blood pulse was 170 and then quickly reverted back to 65 during the appointment time.  His heart rate sounded irregularly irregular    At the last appointment his FEV1 was 56% of predicted reversibility.      He is known to have numerous allergies.    He was treated with azithromycin and prednisone which did provide some benefit.    SINUS CT 5/7/2024:  Impression: Findings of chronic pansinusitis where there may be  underlying polyps.    PAST ASTHMA HISTORY:    He has severe persistent asthma, recurrent nasal polyps with 5 nasal surgeries, the last being in 2019.    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    He was placed  on Dupixent for approximately 1 year in 2020 which worked quite well.  Based on medical chart review, Dupixent became unavailable because of insurance coverage.      Skin testing was positive to dust mite, grass, trees, weeds and molds in the past.    Past Medical History:   Diagnosis Date     Mild intermittent asthma without complication 3/24/2016     Polyp of nasal cavity      Family History   Problem Relation Age of Onset     Hypertension Mother      Thyroid Disease Mother         hypothyroidism     Hypertension Father      Diabetes Maternal Grandfather      Thyroid Disease Daughter         hypothyroidism     Asthma No family hx of      C.A.D. No family hx of      Cerebrovascular Disease No family hx of      Breast Cancer No family hx of      Cancer - colorectal No family hx of      Prostate Cancer No family hx of      Past Surgical History:   Procedure Laterality Date     ENT SURGERY       ETHMOIDECTOMY  6/16/2014    Procedure: ETHMOIDECTOMY;  Surgeon: Jimbo Yang MD;  Location: [facility] OR     HERNIORRHAPHY UMBILICAL N/A 11/21/2022    Procedure: HERNIORRHAPHY, UMBILICAL, OPEN;  Surgeon: Denise Espinoza MD;  Location:  OR     OPTICAL TRACKING SYSTEM ENDOSCOPIC SINUS SURGERY Bilateral 12/3/2019    Procedure: Bilateral endoscopic maxillary antrostomies with tissue removal, bilateral endoscopic total ethmoidectomies, bilateral endoscopic sphenoidotomies, bilateral endoscopic frontal sinusotomies, bilateral endoscopic nasal polypectomy, image guidance;  Surgeon: Josh Swartz MD;  Location:  OR     SURGICAL HISTORY OF -   2010    Nasal Polyp; 3 total surgeries: 1997         Current Outpatient Medications:      albuterol (PROAIR HFA/PROVENTIL HFA/VENTOLIN HFA) 108 (90 Base) MCG/ACT inhaler, Inhale 1-2 puffs into the lungs every 4 hours as needed for shortness of breath, wheezing or other (persistent cough) Please follow up in clinic for next refill., Disp: 18 g, Rfl: 0     azithromycin (ZITHROMAX) 250  MG tablet, 2 tablets the first day, then 1 tablet daily for the next 4 days, Disp: 6 tablet, Rfl: 0     budesonide-formoterol (SYMBICORT) 160-4.5 MCG/ACT Inhaler, Inhale 2 puffs into the lungs 2 times daily, Disp: 10.2 g, Rfl: 5     cyclobenzaprine (FLEXERIL) 5 MG tablet, Take 1 tablet (5 mg) by mouth 3 times daily as needed for muscle spasms, Disp: 6 tablet, Rfl: 1     dupilumab (DUPIXENT) 300 MG/2ML prefilled pen, Inject 2 mLs (300 mg) Subcutaneous every 14 days for 336 days, Disp: 4 mL, Rfl: 11     fluticasone (FLONASE) 50 MCG/ACT nasal spray, Spray 2 sprays into both nostrils daily, Disp: 16 g, Rfl: 4     fluticasone-vilanterol (BREO ELLIPTA) 200-25 MCG/INH inhaler, Inhale 1 puff into the lungs daily, Disp: 60 each, Rfl: 5     montelukast (SINGULAIR) 10 MG tablet, Take 1 tablet (10 mg) by mouth at bedtime, Disp: 90 tablet, Rfl: 3     Multiple Vitamin (MULTIVITAMIN) per tablet, Take 1 tablet by mouth daily, Disp: 100 tablet, Rfl: 12     predniSONE (DELTASONE) 10 MG tablet, Take 3 tabs daily x 4 days, 2 tabs daily x 4 days, and 1 tab daily x 4 days, Disp: 24 tablet, Rfl: 0     predniSONE (DELTASONE) 20 MG tablet, Take 2 tabs daily x 5 days and then 1 tab daily x 5 days, Disp: 15 tablet, Rfl: 0     sildenafil (REVATIO) 20 MG tablet, Take 1-5 tablets 30-60 min before intimacy, Disp: 30 tablet, Rfl: 11  Allergies   Allergen Reactions     Advil [Ibuprofen Micronized] Other (See Comments)     Tight chest     Asa [Aspirin] Nausea and Vomiting     Asthma - tight chest     Hazelnuts [Nuts] Other (See Comments)     Chest tight. Positive SPT. Tolerates other tree nuts and peanut.     Naprosyn [Naproxen] GI Disturbance     Stomach      Tylenol [Acetaminophen] Nausea         EXAM:   /84   Pulse 67   Wt 91 kg (200 lb 9.9 oz)   SpO2 98%   BMI 30.50 kg/m      Constitutional:       General: He is not in acute distress.     Appearance: Normal appearance. He is not ill-appearing.   HENT:      Head: Normocephalic and  atraumatic.      Nose: Nose normal. No congestion or rhinorrhea.      Mouth/Throat:      Mouth: Mucous membranes are moist.      Pharynx: Oropharynx is clear. No posterior oropharyngeal erythema.   Eyes:      General:         Right eye: No discharge.         Left eye: No discharge.   Cardiovascular:      Rate and Rhythm: Normal rate and regular rhythm.      Heart sounds: Normal heart sounds.   Pulmonary:      Effort: Pulmonary effort is normal.      Breath sounds: Normal breath sounds. No wheezing or rhonchi.   Skin:     General: Skin is warm.      Findings: No erythema or rash.   Neurological:      General: No focal deficit present.      Mental Status: He is alert. Mental status is at baseline.   Psychiatric:         Mood and Affect: Mood normal.         Behavior: Behavior normal.        ASSESSMENT/PLAN:  Marco Stovall is a 58 year old male seen today for ongoing evaluation of Samter's triad of aspirin allergy, nasal polyps, as well as severe persistent asthma.  At the last appointment he had significant tachycardia.  He has seen cardiology.  He had a Zio patch done recently.  Unknown results.    Symbicort 2 puffs twice daily  Dupixent 300 mg q 14 days  We will not start Trelegy again in the future due to the possibility of increasing his tachycardia.  Will repeat pulmonary function tests in 3 months to see if there is been any clinical improvement or objective improvement after dupixent    Follow-up in 3 months      Thank you for allowing me to participate in the care of Marco Stovall.      I spent 30 minutes on the date of the encounter doing chart review, history and exam, documentation and further coordination as noted above exclusive of separately reported interpretations    Serg Sanchez MD  Allergy/Immunology  [facility]      Again, thank you for allowing me to participate in the care of your patient.        Sincerely,        Serg Sanchez MD

## 2024-06-27 NOTE — PROGRESS NOTES
Marco Stovall was seen in the Allergy Clinic at [facility].    Marco Stovall is a 58 year old male being seen today for ongoing evaluation of Chronic maxillary sinusitis, tachycardia, and asthma.  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    Since the last visit the patient has been feeling better.    At the last appointment he was having problems with shortness of breath as well as cough and tachycardia.  He was referred to cardiology.  He had a Zio patch placed recently and an echocardiogram.  He feels like his tachycardia worsened on Trelegy.  He is no longer on Trelegy.  He is now on Symbicort 2 puffs twice daily.  He was also started on Dupixent.  He was on this in the past.  He has received 2 shots.  He has had significant improvement.  He is approximately 80% better in regards to his sinuses and asthma.  Recent sinus CT scan performed at the last appointment did show significant sinus disease.    At the last appointment his blood pulse was 170 and then quickly reverted back to 65 during the appointment time.  His heart rate sounded irregularly irregular    At the last appointment his FEV1 was 56% of predicted reversibility.      He is known to have numerous allergies.    He was treated with azithromycin and prednisone which did provide some benefit.    SINUS CT 5/7/2024:  Impression: Findings of chronic pansinusitis where there may be  underlying polyps.    PAST ASTHMA HISTORY:    He has severe persistent asthma, recurrent nasal polyps with 5 nasal surgeries, the last being in 2019.    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    He was placed on Dupixent for approximately 1 year in 2020 which worked quite well.  Based on medical chart review, Dupixent became unavailable because of insurance coverage.      Skin testing was positive to dust mite, grass, trees, weeds and molds in the  past.    Past Medical History:   Diagnosis Date    Mild intermittent asthma without complication 3/24/2016    Polyp of nasal cavity      Family History   Problem Relation Age of Onset    Hypertension Mother     Thyroid Disease Mother         hypothyroidism    Hypertension Father     Diabetes Maternal Grandfather     Thyroid Disease Daughter         hypothyroidism    Asthma No family hx of     C.A.D. No family hx of     Cerebrovascular Disease No family hx of     Breast Cancer No family hx of     Cancer - colorectal No family hx of     Prostate Cancer No family hx of      Past Surgical History:   Procedure Laterality Date    ENT SURGERY      ETHMOIDECTOMY  6/16/2014    Procedure: ETHMOIDECTOMY;  Surgeon: Jimbo Yang MD;  Location: [facility] OR    HERNIORRHAPHY UMBILICAL N/A 11/21/2022    Procedure: HERNIORRHAPHY, UMBILICAL, OPEN;  Surgeon: Denise Espinoza MD;  Location: [facility] OR    OPTICAL TRACKING SYSTEM ENDOSCOPIC SINUS SURGERY Bilateral 12/3/2019    Procedure: Bilateral endoscopic maxillary antrostomies with tissue removal, bilateral endoscopic total ethmoidectomies, bilateral endoscopic sphenoidotomies, bilateral endoscopic frontal sinusotomies, bilateral endoscopic nasal polypectomy, image guidance;  Surgeon: Josh Swartz MD;  Location:  OR    SURGICAL HISTORY OF -   2010    Nasal Polyp; 3 total surgeries: 1997         Current Outpatient Medications:     albuterol (PROAIR HFA/PROVENTIL HFA/VENTOLIN HFA) 108 (90 Base) MCG/ACT inhaler, Inhale 1-2 puffs into the lungs every 4 hours as needed for shortness of breath, wheezing or other (persistent cough) Please follow up in clinic for next refill., Disp: 18 g, Rfl: 0    azithromycin (ZITHROMAX) 250 MG tablet, 2 tablets the first day, then 1 tablet daily for the next 4 days, Disp: 6 tablet, Rfl: 0    budesonide-formoterol (SYMBICORT) 160-4.5 MCG/ACT Inhaler, Inhale 2 puffs into the lungs 2 times daily, Disp: 10.2 g, Rfl: 5    cyclobenzaprine (FLEXERIL) 5 MG  tablet, Take 1 tablet (5 mg) by mouth 3 times daily as needed for muscle spasms, Disp: 6 tablet, Rfl: 1    dupilumab (DUPIXENT) 300 MG/2ML prefilled pen, Inject 2 mLs (300 mg) Subcutaneous every 14 days for 336 days, Disp: 4 mL, Rfl: 11    fluticasone (FLONASE) 50 MCG/ACT nasal spray, Spray 2 sprays into both nostrils daily, Disp: 16 g, Rfl: 4    fluticasone-vilanterol (BREO ELLIPTA) 200-25 MCG/INH inhaler, Inhale 1 puff into the lungs daily, Disp: 60 each, Rfl: 5    montelukast (SINGULAIR) 10 MG tablet, Take 1 tablet (10 mg) by mouth at bedtime, Disp: 90 tablet, Rfl: 3    Multiple Vitamin (MULTIVITAMIN) per tablet, Take 1 tablet by mouth daily, Disp: 100 tablet, Rfl: 12    predniSONE (DELTASONE) 10 MG tablet, Take 3 tabs daily x 4 days, 2 tabs daily x 4 days, and 1 tab daily x 4 days, Disp: 24 tablet, Rfl: 0    predniSONE (DELTASONE) 20 MG tablet, Take 2 tabs daily x 5 days and then 1 tab daily x 5 days, Disp: 15 tablet, Rfl: 0    sildenafil (REVATIO) 20 MG tablet, Take 1-5 tablets 30-60 min before intimacy, Disp: 30 tablet, Rfl: 11  Allergies   Allergen Reactions    Advil [Ibuprofen Micronized] Other (See Comments)     Tight chest    Asa [Aspirin] Nausea and Vomiting     Asthma - tight chest    Hazelnuts [Nuts] Other (See Comments)     Chest tight. Positive SPT. Tolerates other tree nuts and peanut.    Naprosyn [Naproxen] GI Disturbance     Stomach     Tylenol [Acetaminophen] Nausea         EXAM:   /84   Pulse 67   Wt 91 kg (200 lb 9.9 oz)   SpO2 98%   BMI 30.50 kg/m      Constitutional:       General: He is not in acute distress.     Appearance: Normal appearance. He is not ill-appearing.   HENT:      Head: Normocephalic and atraumatic.      Nose: Nose normal. No congestion or rhinorrhea.      Mouth/Throat:      Mouth: Mucous membranes are moist.      Pharynx: Oropharynx is clear. No posterior oropharyngeal erythema.   Eyes:      General:         Right eye: No discharge.         Left eye: No discharge.    Cardiovascular:      Rate and Rhythm: Normal rate and regular rhythm.      Heart sounds: Normal heart sounds.   Pulmonary:      Effort: Pulmonary effort is normal.      Breath sounds: Normal breath sounds. No wheezing or rhonchi.   Skin:     General: Skin is warm.      Findings: No erythema or rash.   Neurological:      General: No focal deficit present.      Mental Status: He is alert. Mental status is at baseline.   Psychiatric:         Mood and Affect: Mood normal.         Behavior: Behavior normal.        ASSESSMENT/PLAN:  Mraco Stovall is a 58 year old male seen today for ongoing evaluation of Samter's triad of aspirin allergy, nasal polyps, as well as severe persistent asthma.  At the last appointment he had significant tachycardia.  He has seen cardiology.  He had a Zio patch done recently.  Unknown results.    Symbicort 2 puffs twice daily  Dupixent 300 mg q 14 days  We will not start Trelegy again in the future due to the possibility of increasing his tachycardia.  Will repeat pulmonary function tests in 3 months to see if there is been any clinical improvement or objective improvement after dupixent    Follow-up in 3 months      Thank you for allowing me to participate in the care of Marco Stovall.      I spent 30 minutes on the date of the encounter doing chart review, history and exam, documentation and further coordination as noted above exclusive of separately reported interpretations    Serg Sanchez MD  Allergy/Immunology  [facility]

## 2024-07-01 PROCEDURE — 93248 EXT ECG>7D<15D REV&INTERPJ: CPT | Performed by: INTERNAL MEDICINE

## 2024-07-02 DIAGNOSIS — I47.10 SVT (SUPRAVENTRICULAR TACHYCARDIA) (H): Primary | ICD-10-CM

## 2024-07-03 ENCOUNTER — ANCILLARY PROCEDURE (OUTPATIENT)
Dept: CARDIOLOGY | Facility: CLINIC | Age: 59
End: 2024-07-03
Attending: INTERNAL MEDICINE
Payer: COMMERCIAL

## 2024-07-03 ENCOUNTER — LAB (OUTPATIENT)
Dept: LAB | Facility: CLINIC | Age: 59
End: 2024-07-03
Payer: COMMERCIAL

## 2024-07-03 DIAGNOSIS — R00.0 TACHYCARDIA: ICD-10-CM

## 2024-07-03 LAB
ANION GAP SERPL CALCULATED.3IONS-SCNC: 9 MMOL/L (ref 7–15)
BUN SERPL-MCNC: 11.8 MG/DL (ref 6–20)
CALCIUM SERPL-MCNC: 9.3 MG/DL (ref 8.6–10)
CHLORIDE SERPL-SCNC: 105 MMOL/L (ref 98–107)
CHOLEST SERPL-MCNC: 249 MG/DL
CREAT SERPL-MCNC: 1.05 MG/DL (ref 0.67–1.17)
DEPRECATED HCO3 PLAS-SCNC: 27 MMOL/L (ref 22–29)
EGFRCR SERPLBLD CKD-EPI 2021: 83 ML/MIN/1.73M2
ERYTHROCYTE [DISTWIDTH] IN BLOOD BY AUTOMATED COUNT: 13.1 % (ref 10–15)
FASTING STATUS PATIENT QL REPORTED: YES
FASTING STATUS PATIENT QL REPORTED: YES
GLUCOSE SERPL-MCNC: 98 MG/DL (ref 70–99)
HCT VFR BLD AUTO: 44.6 % (ref 40–53)
HDLC SERPL-MCNC: 49 MG/DL
HGB BLD-MCNC: 15.4 G/DL (ref 13.3–17.7)
LDLC SERPL CALC-MCNC: 175 MG/DL
LVEF ECHO: NORMAL
MCH RBC QN AUTO: 31.4 PG (ref 26.5–33)
MCHC RBC AUTO-ENTMCNC: 34.5 G/DL (ref 31.5–36.5)
MCV RBC AUTO: 91 FL (ref 78–100)
NONHDLC SERPL-MCNC: 200 MG/DL
PLATELET # BLD AUTO: 196 10E3/UL (ref 150–450)
POTASSIUM SERPL-SCNC: 4 MMOL/L (ref 3.4–5.3)
RBC # BLD AUTO: 4.9 10E6/UL (ref 4.4–5.9)
SODIUM SERPL-SCNC: 141 MMOL/L (ref 135–145)
TRIGL SERPL-MCNC: 127 MG/DL
WBC # BLD AUTO: 6.8 10E3/UL (ref 4–11)

## 2024-07-03 PROCEDURE — 85027 COMPLETE CBC AUTOMATED: CPT

## 2024-07-03 PROCEDURE — 93306 TTE W/DOPPLER COMPLETE: CPT | Performed by: INTERNAL MEDICINE

## 2024-07-03 PROCEDURE — 36415 COLL VENOUS BLD VENIPUNCTURE: CPT

## 2024-07-03 PROCEDURE — 80048 BASIC METABOLIC PNL TOTAL CA: CPT

## 2024-07-03 PROCEDURE — 80061 LIPID PANEL: CPT

## 2024-07-08 DIAGNOSIS — E78.5 HYPERLIPIDEMIA LDL GOAL <130: Primary | ICD-10-CM

## 2024-07-16 ENCOUNTER — OFFICE VISIT (OUTPATIENT)
Dept: CARDIOLOGY | Facility: CLINIC | Age: 59
End: 2024-07-16
Payer: COMMERCIAL

## 2024-07-16 VITALS
WEIGHT: 200.8 LBS | SYSTOLIC BLOOD PRESSURE: 131 MMHG | OXYGEN SATURATION: 97 % | BODY MASS INDEX: 30.53 KG/M2 | HEART RATE: 75 BPM | DIASTOLIC BLOOD PRESSURE: 81 MMHG

## 2024-07-16 DIAGNOSIS — I47.10 SVT (SUPRAVENTRICULAR TACHYCARDIA) (H): ICD-10-CM

## 2024-07-16 PROCEDURE — 99213 OFFICE O/P EST LOW 20 MIN: CPT

## 2024-07-16 PROCEDURE — 99215 OFFICE O/P EST HI 40 MIN: CPT

## 2024-07-16 ASSESSMENT — PAIN SCALES - GENERAL: PAINLEVEL: NO PAIN (0)

## 2024-07-16 NOTE — NURSING NOTE
Chief Complaint   Patient presents with    New Patient     NEW-SVT, ref from Dr Davis  Recent echo and zio  No cardiac meds          Vitals were taken, medications reconciled.    Karla Hendrix, Clinic Assistant   3:56 PM

## 2024-07-16 NOTE — PATIENT INSTRUCTIONS
You were seen in the Electrophysiology Clinic today by: Niurka REED    Plan:       Follow up Visit: 1 year with Niurka       If you have further questions, please utilize [employer] to contact us.     Your Care Team:    EP Cardiology   Telephone Number     Nurse Line  Alysha Sanchez, RN   Cathryn Livingston, RN  Fareed Sun, IVETH   [phone]     For scheduling appointments:   [facility]   [phone]   For procedure scheduling:    Nevaeh Loza     [phone]   For the Device Clinic (Pacemakers, ICDs, Loop Recorders)    During business hours: [phone]  After business hours:   [phone]- select option 4 and ask for job code [id number].       On-call cardiologist for after hours or on weekends:   [phone], option #4, and ask to speak to the on-call cardiologist.     Cardiovascular Clinic:   [address] [place] [address]. [place] [address]      As always, Thank you for trusting us with your health care needs!

## 2024-07-16 NOTE — PROGRESS NOTES
ELECTROPHYSIOLOGY CLINIC VISIT    Assessment/Recommendations   Assessment/Plan:    Marco Stovall is a 58 year old male with past medical history significant for hyperlipidemia, umbilical hernia, asthma.     Palpitations  SVT   We discussed various options for treatment of SVT. This is not a life threatening arrhythmia. Treatment is indicated primarily for relief of symptoms. Medications such as calcium channel blockers or beta blockers in some cases reduce the frequency and duration of the episodes but they will not cure it. The other option discussed was an electrophysiology study (EPS) and possible ablation. Success rate of ablation 80-90% depending on the mechanism. He is not bothered by symptoms and feels occurrences have improved since stopping trelegy, he prefers conservative management at this time. We will continue to monitor symptoms for now.       Follow up in 1 year, sooner as needed.      History of Present Illness/Subjective    Mr. Marco Stovall is a 58 year old male who comes in today for EP consultation of SVT.    Marco Stovall is a 58 year old male with past medical history significant for hyperlipidemia, umbilical hernia, asthma.   Patient saw Serg Sanchez MD his allergist on 4/26/2024, and during the appointment his heart rate was anywhere from 146 to 170 bpm. Shortly after this, his heart rate normalized to the 70s, at visit he was started on azithromycin, a prednisone taper, azithromycin, trelegy was stopped, dupixent started and a cardiology referral was placed.   He was seen by Dr Davis on 6/12/24, reported he recently started trileptial on 2/13/24 and has noted some rapid heart beats/palpitations on his apple watch. He does not feel dizzy or lightheaded. No episodes of near syncope. He is now off the Trelegy and started Dupixent the end of April. Labs, zio and echo ordered. Zio done 6/12-6/26/24 with 365 episodes, longest 10 mins. Symptoms associated to SVT. Echo done 7/3/24 with  normal LV and RV function, no significant valvular abnormalities.     He presents today for further evaluation of SVT. He feels symptoms have gradually improved since stopping trelegy and starting dupixent. He was also on prednisone surrounding this time. He occasionally notes palpitations, but doesn't find it very bother some. He is not getting notifications for high HR on his apple watch anymore like he was before. He otherwise denies associated chest pain/pressure, shortness of breath, dizziness, lightheadedness or syncope.     Family History:    - Mom: 81, valve replaced, no other cardiac hx    - Dad: in 80s, no cardiac hx   I have reviewed and updated the patient's Past Medical History, Social History, Family History and Medication List.     Cardiographics (Personally Reviewed) :   Echo: 2024   Interpretation Summary  Global and regional left ventricular function is normal with an EF of 60-65%.  Right ventricular function, chamber size, wall motion, and thickness are  normal.  Pulmonary artery systolic pressure is normal.  The inferior vena cava is normal.  No pericardial effusion is present.  There is no prior study for direct comparison.     EK24: sinus 63 bpm, normal intervals   19: sinus 53 bpm, normal intervals     Ziopatch:   -24: sinus  bpm, avg 72 bpm. 365 episodes, longest 10 mins. Symptoms associated to SVT       Physical Examination   /81 (BP Location: Left arm, Patient Position: Chair, Cuff Size: Adult Regular)   Pulse 75   Wt 91.1 kg (200 lb 12.8 oz)   SpO2 97%   BMI 30.53 kg/m    Wt Readings from Last 3 Encounters:   24 91.1 kg (200 lb 12.8 oz)   24 91 kg (200 lb 9.9 oz)   24 91 kg (200 lb 11.2 oz)     General Appearance:   Alert, well-appearing and in no acute distress.   HEENT: Atraumatic, normocephalic. MMM.   Chest/Lungs:   Respirations unlabored.  Lungs are clear to auscultation.   Cardiovascular:   Regular rate and rhythm.   S1/S2. No murmur.    Abdomen:  Soft, nontender, nondistended.   Extremities: No cyanosis or clubbing. No edema.    Musculoskeletal: Moves all extremities.     Skin: Warm, dry, intact.    Neurologic: Mood and affect are appropriate.  Alert and oriented to person, place, time, and situation.          Medications  Allergies   No cardiac meds.     Albuterol   Flexeril   Dupixent   Inhalers   Singulair   Vitamins   Sildenafil prn  Allergies   Allergen Reactions    Advil [Ibuprofen Micronized] Other (See Comments)     Tight chest    Asa [Aspirin] Nausea and Vomiting     Asthma - tight chest    Hazelnuts [Nuts] Other (See Comments)     Chest tight. Positive SPT. Tolerates other tree nuts and peanut.    Naprosyn [Naproxen] GI Disturbance     Stomach     Tylenol [Acetaminophen] Nausea         Lab Results (Personally Reviewed)    Chemistry/lipid CBC Cardiac Enzymes/BNP/TSH/INR   Lab Results   Component Value Date    BUN 11.8 07/03/2024     07/03/2024    CO2 27 07/03/2024     Creatinine   Date Value Ref Range Status   07/03/2024 1.05 0.67 - 1.17 mg/dL Final   11/18/2019 0.85 0.66 - 1.25 mg/dL Final       Lab Results   Component Value Date    CHOL 249 (H) 07/03/2024    HDL 49 07/03/2024     (H) 07/03/2024      Lab Results   Component Value Date    WBC 6.8 07/03/2024    HGB 15.4 07/03/2024    HCT 44.6 07/03/2024    MCV 91 07/03/2024     07/03/2024    Lab Results   Component Value Date    TSH 2.05 07/15/2011    INR 0.97 11/09/2022        The patient states understanding and is agreeable with the plan.     Niurka Rai PA-C  [facility]  Electrophysiology Consult Service  Pager: [phone]    I spent a total of 25 minutes face to face with Marco Stovall during today's office visit. I have spent an additional 20 minutes today on chart review and documentation.

## 2024-07-16 NOTE — LETTER
7/16/2024      RE: Marco Stovall  [address]  [place] [address]       Dear Colleague,    Thank you for the opportunity to participate in the care of your patient, Marco Stovall, at the [facility] [place] at [facility]. Please see a copy of my visit note below.        ELECTROPHYSIOLOGY CLINIC VISIT    Assessment/Recommendations   Assessment/Plan:    Marco Stovall is a 58 year old male with past medical history significant for hyperlipidemia, umbilical hernia, asthma.     Palpitations  SVT   We discussed various options for treatment of SVT. This is not a life threatening arrhythmia. Treatment is indicated primarily for relief of symptoms. Medications such as calcium channel blockers or beta blockers in some cases reduce the frequency and duration of the episodes but they will not cure it. The other option discussed was an electrophysiology study (EPS) and possible ablation. Success rate of ablation 80-90% depending on the mechanism. He is not bothered by symptoms and feels occurrences have improved since stopping trelegy, he prefers conservative management at this time. We will continue to monitor symptoms for now.       Follow up in 1 year, sooner as needed.      History of Present Illness/Subjective    Mr. Marco Stovall is a 58 year old male who comes in today for EP consultation of SVT.    Marco Stovall is a 58 year old male with past medical history significant for hyperlipidemia, umbilical hernia, asthma.   Patient saw Serg Sanchez MD his allergist on 4/26/2024, and during the appointment his heart rate was anywhere from 146 to 170 bpm. Shortly after this, his heart rate normalized to the 70s, at visit he was started on azithromycin, a prednisone taper, azithromycin, trelegy was stopped, dupixent started and a cardiology referral was placed.   He was seen by Dr Davis on 6/12/24, reported he recently started  trileptial on 24 and has noted some rapid heart beats/palpitations on his apple watch. He does not feel dizzy or lightheaded. No episodes of near syncope. He is now off the Trelegy and started Dupixent the end of April. Labs, zio and echo ordered. Zio done -24 with 365 episodes, longest 10 mins. Symptoms associated to SVT. Echo done 7/3/24 with normal LV and RV function, no significant valvular abnormalities.     He presents today for further evaluation of SVT. He feels symptoms have gradually improved since stopping trelegy and starting dupixent. He was also on prednisone surrounding this time. He occasionally notes palpitations, but doesn't find it very bother some. He is not getting notifications for high HR on his apple watch anymore like he was before. He otherwise denies associated chest pain/pressure, shortness of breath, dizziness, lightheadedness or syncope.     Family History:    - Mom: 81, valve replaced, no other cardiac hx    - Dad: in 80s, no cardiac hx   I have reviewed and updated the patient's Past Medical History, Social History, Family History and Medication List.     Cardiographics (Personally Reviewed) :   Echo: 2024   Interpretation Summary  Global and regional left ventricular function is normal with an EF of 60-65%.  Right ventricular function, chamber size, wall motion, and thickness are  normal.  Pulmonary artery systolic pressure is normal.  The inferior vena cava is normal.  No pericardial effusion is present.  There is no prior study for direct comparison.     EK24: sinus 63 bpm, normal intervals   19: sinus 53 bpm, normal intervals     Ziopatch:   -24: sinus  bpm, avg 72 bpm. 365 episodes, longest 10 mins. Symptoms associated to SVT       Physical Examination   /81 (BP Location: Left arm, Patient Position: Chair, Cuff Size: Adult Regular)   Pulse 75   Wt 91.1 kg (200 lb 12.8 oz)   SpO2 97%   BMI 30.53 kg/m    Wt Readings from  Last 3 Encounters:   07/16/24 91.1 kg (200 lb 12.8 oz)   06/27/24 91 kg (200 lb 9.9 oz)   06/12/24 91 kg (200 lb 11.2 oz)     General Appearance:   Alert, well-appearing and in no acute distress.   HEENT: Atraumatic, normocephalic. MMM.   Chest/Lungs:   Respirations unlabored.  Lungs are clear to auscultation.   Cardiovascular:   Regular rate and rhythm.  S1/S2. No murmur.    Abdomen:  Soft, nontender, nondistended.   Extremities: No cyanosis or clubbing. No edema.    Musculoskeletal: Moves all extremities.     Skin: Warm, dry, intact.    Neurologic: Mood and affect are appropriate.  Alert and oriented to person, place, time, and situation.          Medications  Allergies   No cardiac meds.     Albuterol   Flexeril   Dupixent   Inhalers   Singulair   Vitamins   Sildenafil prn  Allergies   Allergen Reactions     Advil [Ibuprofen Micronized] Other (See Comments)     Tight chest     Asa [Aspirin] Nausea and Vomiting     Asthma - tight chest     Hazelnuts [Nuts] Other (See Comments)     Chest tight. Positive SPT. Tolerates other tree nuts and peanut.     Naprosyn [Naproxen] GI Disturbance     Stomach      Tylenol [Acetaminophen] Nausea         Lab Results (Personally Reviewed)    Chemistry/lipid CBC Cardiac Enzymes/BNP/TSH/INR   Lab Results   Component Value Date    BUN 11.8 07/03/2024     07/03/2024    CO2 27 07/03/2024     Creatinine   Date Value Ref Range Status   07/03/2024 1.05 0.67 - 1.17 mg/dL Final   11/18/2019 0.85 0.66 - 1.25 mg/dL Final       Lab Results   Component Value Date    CHOL 249 (H) 07/03/2024    HDL 49 07/03/2024     (H) 07/03/2024      Lab Results   Component Value Date    WBC 6.8 07/03/2024    HGB 15.4 07/03/2024    HCT 44.6 07/03/2024    MCV 91 07/03/2024     07/03/2024    Lab Results   Component Value Date    TSH 2.05 07/15/2011    INR 0.97 11/09/2022        The patient states understanding and is agreeable with the plan.     Niurka Rai PA-C  [facility]  [facility]  Electrophysiology Consult Service  Pager: [phone]    I spent a total of 25 minutes face to face with Marco Stovall during today's office visit. I have spent an additional 20 minutes today on chart review and documentation.                     Please do not hesitate to contact me if you have any questions/concerns.     Sincerely,     Niurka Rai PA-C

## 2024-08-07 ENCOUNTER — MYC MEDICAL ADVICE (OUTPATIENT)
Dept: ALLERGY | Facility: CLINIC | Age: 59
End: 2024-08-07
Payer: COMMERCIAL

## 2024-08-07 DIAGNOSIS — J33.9 NASAL POLYPOSIS: Primary | ICD-10-CM

## 2024-08-07 DIAGNOSIS — J45.50 SEVERE PERSISTENT ASTHMA WITHOUT COMPLICATION (H): ICD-10-CM

## 2024-08-07 DIAGNOSIS — J32.0 CHRONIC MAXILLARY SINUSITIS: ICD-10-CM

## 2024-08-07 RX ORDER — DUPILUMAB 300 MG/2ML
300 INJECTION, SOLUTION SUBCUTANEOUS
Qty: 2 ML | Refills: 6 | Status: SHIPPED | OUTPATIENT
Start: 2024-08-07

## 2024-08-07 NOTE — TELEPHONE ENCOUNTER
See [employer] message from the patient.  He currently has a Dupixent auto injector pen, but is requesting the prefilled syringe instead.  New RX is pended.  Please review and sign.    Yulisa Mustafa RN

## 2024-09-18 ENCOUNTER — PATIENT OUTREACH (OUTPATIENT)
Dept: CARE COORDINATION | Facility: CLINIC | Age: 59
End: 2024-09-18
Payer: COMMERCIAL

## 2024-09-26 ASSESSMENT — ASTHMA QUESTIONNAIRES
QUESTION_5 LAST FOUR WEEKS HOW WOULD YOU RATE YOUR ASTHMA CONTROL: COMPLETELY CONTROLLED
QUESTION_2 LAST FOUR WEEKS HOW OFTEN HAVE YOU HAD SHORTNESS OF BREATH: NOT AT ALL
QUESTION_4 LAST FOUR WEEKS HOW OFTEN HAVE YOU USED YOUR RESCUE INHALER OR NEBULIZER MEDICATION (SUCH AS ALBUTEROL): NOT AT ALL
ACT_TOTALSCORE: 25
QUESTION_3 LAST FOUR WEEKS HOW OFTEN DID YOUR ASTHMA SYMPTOMS (WHEEZING, COUGHING, SHORTNESS OF BREATH, CHEST TIGHTNESS OR PAIN) WAKE YOU UP AT NIGHT OR EARLIER THAN USUAL IN THE MORNING: NOT AT ALL
QUESTION_1 LAST FOUR WEEKS HOW MUCH OF THE TIME DID YOUR ASTHMA KEEP YOU FROM GETTING AS MUCH DONE AT WORK, SCHOOL OR AT HOME: NONE OF THE TIME
ACT_TOTALSCORE: 25

## 2024-10-01 ENCOUNTER — OFFICE VISIT (OUTPATIENT)
Dept: PULMONOLOGY | Facility: CLINIC | Age: 59
End: 2024-10-01
Attending: INTERNAL MEDICINE
Payer: COMMERCIAL

## 2024-10-01 ENCOUNTER — OFFICE VISIT (OUTPATIENT)
Dept: ALLERGY | Facility: CLINIC | Age: 59
End: 2024-10-01
Attending: INTERNAL MEDICINE
Payer: COMMERCIAL

## 2024-10-01 VITALS
BODY MASS INDEX: 30.56 KG/M2 | WEIGHT: 201 LBS | DIASTOLIC BLOOD PRESSURE: 88 MMHG | SYSTOLIC BLOOD PRESSURE: 134 MMHG | OXYGEN SATURATION: 98 % | HEART RATE: 62 BPM

## 2024-10-01 DIAGNOSIS — J45.50 SEVERE PERSISTENT ASTHMA WITHOUT COMPLICATION (H): ICD-10-CM

## 2024-10-01 DIAGNOSIS — Z88.6 SAMTER'S TRIAD: ICD-10-CM

## 2024-10-01 DIAGNOSIS — J32.0 CHRONIC MAXILLARY SINUSITIS: ICD-10-CM

## 2024-10-01 DIAGNOSIS — J45.909 SAMTER'S TRIAD: ICD-10-CM

## 2024-10-01 DIAGNOSIS — N52.9 ERECTILE DYSFUNCTION, UNSPECIFIED ERECTILE DYSFUNCTION TYPE: ICD-10-CM

## 2024-10-01 DIAGNOSIS — J33.9 SAMTER'S TRIAD: ICD-10-CM

## 2024-10-01 DIAGNOSIS — J33.9 NASAL POLYPOSIS: ICD-10-CM

## 2024-10-01 PROCEDURE — 99214 OFFICE O/P EST MOD 30 MIN: CPT | Performed by: INTERNAL MEDICINE

## 2024-10-01 PROCEDURE — 99207 PR NO CHARGE LOS: CPT | Performed by: INTERNAL MEDICINE

## 2024-10-01 PROCEDURE — 94375 RESPIRATORY FLOW VOLUME LOOP: CPT | Performed by: INTERNAL MEDICINE

## 2024-10-01 RX ORDER — SILDENAFIL CITRATE 20 MG/1
TABLET ORAL
Qty: 30 TABLET | Refills: 11 | Status: SHIPPED | OUTPATIENT
Start: 2024-10-01

## 2024-10-01 NOTE — LETTER
10/1/2024      Marco Stovall  [address]  [place] [address]      Dear Colleague,    Thank you for referring your patient, Marco Stovall, to the [facility] [place]. Please see a copy of my visit note below.    Marco Stovall was seen in the Allergy Clinic at [facility].    Marco Stovall is a 58 year old male being seen today for ongoing evaluation of chronic maxillary sinusitis, and asthma.  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    Since the last visit the patient has been feeling great.  His tachycardia has resolved and he did see cardiology.  When he had the Zio patch on he did have episodes of SVT.    Symbicort 2 puffs twice daily continues and Trelegy was previously stopped.  He believes Trelegy did contribute to his tachycardia.  If he misses doses of the Symbicort for 2 days he will start noticing chest tightness.    He was restarted on Dupixent and is finding this to be quite helpful in regards to his sinus symptoms as well as his asthma.  He prefers the syringe over the pen.  He feels he has better control of the medication.  The pen was sometimes leaking.    FEV1 was 56% of predicted previously and repeat spirometry took place today.    He is known to have numerous environmental allergies.    He does continue to take Singulair.    SINUS CT 5/7/2024:  Impression: Findings of chronic pansinusitis where there may be  underlying polyps.    PAST ASTHMA HISTORY:    He has severe persistent asthma, recurrent nasal polyps with 5 nasal surgeries, the last being in 2019.    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    He was placed on Dupixent for approximately 1 year in 2020 which worked quite well.  Based on medical chart review, Dupixent became unavailable because of insurance coverage.      Skin testing was positive to dust mite, grass, trees, weeds  and molds in the past.    Past Medical History:   Diagnosis Date     Mild intermittent asthma without complication 3/24/2016     Polyp of nasal cavity      Family History   Problem Relation Age of Onset     Hypertension Mother      Thyroid Disease Mother         hypothyroidism     Hypertension Father      Diabetes Maternal Grandfather      Thyroid Disease Daughter         hypothyroidism     Asthma No family hx of      C.A.D. No family hx of      Cerebrovascular Disease No family hx of      Breast Cancer No family hx of      Cancer - colorectal No family hx of      Prostate Cancer No family hx of      Past Surgical History:   Procedure Laterality Date     ENT SURGERY       ETHMOIDECTOMY  6/16/2014    Procedure: ETHMOIDECTOMY;  Surgeon: Jimbo Yang MD;  Location: [facility] OR     HERNIORRHAPHY UMBILICAL N/A 11/21/2022    Procedure: HERNIORRHAPHY, UMBILICAL, OPEN;  Surgeon: Denise Espinoza MD;  Location:  OR     OPTICAL TRACKING SYSTEM ENDOSCOPIC SINUS SURGERY Bilateral 12/3/2019    Procedure: Bilateral endoscopic maxillary antrostomies with tissue removal, bilateral endoscopic total ethmoidectomies, bilateral endoscopic sphenoidotomies, bilateral endoscopic frontal sinusotomies, bilateral endoscopic nasal polypectomy, image guidance;  Surgeon: Josh Swartz MD;  Location:  OR     SURGICAL HISTORY OF -   2010    Nasal Polyp; 3 total surgeries: 1997         Current Outpatient Medications:      budesonide-formoterol (SYMBICORT) 160-4.5 MCG/ACT Inhaler, Inhale 2 puffs into the lungs 2 times daily, Disp: 10.2 g, Rfl: 5     dupilumab (DUPIXENT) 300 MG/2ML prefilled syringe, Inject 2 mLs (300 mg) subcutaneously every 14 days, Disp: 2 mL, Rfl: 6     fluticasone (FLONASE) 50 MCG/ACT nasal spray, Spray 2 sprays into both nostrils daily, Disp: 16 g, Rfl: 4     montelukast (SINGULAIR) 10 MG tablet, Take 1 tablet (10 mg) by mouth at bedtime, Disp: 90 tablet, Rfl: 3     Multiple Vitamin (MULTIVITAMIN) per tablet, Take  1 tablet by mouth daily, Disp: 100 tablet, Rfl: 12     sildenafil (REVATIO) 20 MG tablet, Take 1-5 tablets 30-60 min before intimacy, Disp: 30 tablet, Rfl: 11     albuterol (PROAIR HFA/PROVENTIL HFA/VENTOLIN HFA) 108 (90 Base) MCG/ACT inhaler, Inhale 1-2 puffs into the lungs every 4 hours as needed for shortness of breath, wheezing or other (persistent cough) Please follow up in clinic for next refill., Disp: 18 g, Rfl: 0     fluticasone-vilanterol (BREO ELLIPTA) 200-25 MCG/INH inhaler, Inhale 1 puff into the lungs daily (Patient not taking: Reported on 7/16/2024), Disp: 60 each, Rfl: 5  Allergies   Allergen Reactions     Advil [Ibuprofen Micronized] Other (See Comments)     Tight chest     Asa [Aspirin] Nausea and Vomiting     Asthma - tight chest     Hazelnuts [Nuts] Other (See Comments)     Chest tight. Positive SPT. Tolerates other tree nuts and peanut.     Naprosyn [Naproxen] GI Disturbance     Stomach      Tylenol [Acetaminophen] Nausea         EXAM:   /88   Pulse 62   Wt 91.2 kg (201 lb)   SpO2 98%   BMI 30.56 kg/m      Constitutional:       General: He is not in acute distress.     Appearance: Normal appearance. He is not ill-appearing.   HENT:      Head: Normocephalic and atraumatic.      Nose: Nose normal. No congestion or rhinorrhea.      Mouth/Throat:      Mouth: Mucous membranes are moist.      Pharynx: Oropharynx is clear. No posterior oropharyngeal erythema.   Eyes:      General:         Right eye: No discharge.         Left eye: No discharge.   Cardiovascular:      Rate and Rhythm: Normal rate and regular rhythm.      Heart sounds: Normal heart sounds.   Pulmonary:      Effort: Pulmonary effort is normal.      Breath sounds: Normal breath sounds. No wheezing or rhonchi.   Skin:     General: Skin is warm.      Findings: No erythema or rash.   Neurological:      General: No focal deficit present.      Mental Status: He is alert. Mental status is at baseline.   Psychiatric:         Mood and  Affect: Mood normal.         Behavior: Behavior normal.      WORKUP: Spirometry has improved since previous but does still show evidence of mild obstruction.  FEV1 improved from 56% predicted to 71% predicted.  His FVC improved from 79 to 91% predicted.    ASSESSMENT/PLAN:  Marco Stovall is a 58 year old male seen today for ongoing evaluation of severe persistent asthma as well as chronic rhinosinusitis with polyps and aspirin allergy consistent with Samter's triad.  Trelegy had been used and may have contributed to some of his SVT or tachycardia.  He is tolerating Symbicort 2 puffs twice daily quite well.  If he misses a few doses he will have chest tightness.  His lung function has improved on spirometry.  He is not having any sinus symptoms at this time.  He does still take Singulair.  He is uncertain if symptoms will change off of Singulair.    We discussed discontinuation of Singulair if he feels comfortable.  He also asked for refill of his sildenafil.    Symbicort 2 puffs twice daily.  A coupon was given to him to reduce the cost.  Dupixent 300 mg q 14 days  We will not start Trelegy again in the future due to the possibility of increasing his tachycardia.  He will consider stopping Singulair.  Sildenafil refilled, I did recommend that he have a primary care provider to assist with refills for this medication and other possible needs.    Follow-up in 6 months      Thank you for allowing me to participate in the care of Marco Stovall.      I spent 35 minutes on the date of the encounter doing chart review, history and exam, documentation and further coordination as noted above exclusive of separately reported interpretations    Serg Sanchez MD  Allergy/Immunology  [facility]      Again, thank you for allowing me to participate in the care of your patient.        Sincerely,        Serg Sanchez MD

## 2024-10-01 NOTE — PROGRESS NOTES
Marco Stovall was seen in the Allergy Clinic at [facility].    Marco Stovall is a 58 year old male being seen today for ongoing evaluation of chronic maxillary sinusitis, and asthma.  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    Since the last visit the patient has been feeling great.  His tachycardia has resolved and he did see cardiology.  When he had the Zio patch on he did have episodes of SVT.    Symbicort 2 puffs twice daily continues and Trelegy was previously stopped.  He believes Trelegy did contribute to his tachycardia.  If he misses doses of the Symbicort for 2 days he will start noticing chest tightness.    He was restarted on Dupixent and is finding this to be quite helpful in regards to his sinus symptoms as well as his asthma.  He prefers the syringe over the pen.  He feels he has better control of the medication.  The pen was sometimes leaking.    FEV1 was 56% of predicted previously and repeat spirometry took place today.    He is known to have numerous environmental allergies.    He does continue to take Singulair.    SINUS CT 5/7/2024:  Impression: Findings of chronic pansinusitis where there may be  underlying polyps.    PAST ASTHMA HISTORY:    He has severe persistent asthma, recurrent nasal polyps with 5 nasal surgeries, the last being in 2019.    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    He was placed on Dupixent for approximately 1 year in 2020 which worked quite well.  Based on medical chart review, Dupixent became unavailable because of insurance coverage.      Skin testing was positive to dust mite, grass, trees, weeds and molds in the past.    Past Medical History:   Diagnosis Date    Mild intermittent asthma without complication 3/24/2016    Polyp of nasal cavity      Family History   Problem Relation Age of Onset    Hypertension Mother     Thyroid Disease Mother          hypothyroidism    Hypertension Father     Diabetes Maternal Grandfather     Thyroid Disease Daughter         hypothyroidism    Asthma No family hx of     C.A.D. No family hx of     Cerebrovascular Disease No family hx of     Breast Cancer No family hx of     Cancer - colorectal No family hx of     Prostate Cancer No family hx of      Past Surgical History:   Procedure Laterality Date    ENT SURGERY      ETHMOIDECTOMY  6/16/2014    Procedure: ETHMOIDECTOMY;  Surgeon: Jimbo Yang MD;  Location: [facility] OR    HERNIORRHAPHY UMBILICAL N/A 11/21/2022    Procedure: HERNIORRHAPHY, UMBILICAL, OPEN;  Surgeon: Denise Espinoza MD;  Location:  OR    OPTICAL TRACKING SYSTEM ENDOSCOPIC SINUS SURGERY Bilateral 12/3/2019    Procedure: Bilateral endoscopic maxillary antrostomies with tissue removal, bilateral endoscopic total ethmoidectomies, bilateral endoscopic sphenoidotomies, bilateral endoscopic frontal sinusotomies, bilateral endoscopic nasal polypectomy, image guidance;  Surgeon: Josh Swartz MD;  Location:  OR    SURGICAL HISTORY OF -   2010    Nasal Polyp; 3 total surgeries: 1997         Current Outpatient Medications:     budesonide-formoterol (SYMBICORT) 160-4.5 MCG/ACT Inhaler, Inhale 2 puffs into the lungs 2 times daily, Disp: 10.2 g, Rfl: 5    dupilumab (DUPIXENT) 300 MG/2ML prefilled syringe, Inject 2 mLs (300 mg) subcutaneously every 14 days, Disp: 2 mL, Rfl: 6    fluticasone (FLONASE) 50 MCG/ACT nasal spray, Spray 2 sprays into both nostrils daily, Disp: 16 g, Rfl: 4    montelukast (SINGULAIR) 10 MG tablet, Take 1 tablet (10 mg) by mouth at bedtime, Disp: 90 tablet, Rfl: 3    Multiple Vitamin (MULTIVITAMIN) per tablet, Take 1 tablet by mouth daily, Disp: 100 tablet, Rfl: 12    sildenafil (REVATIO) 20 MG tablet, Take 1-5 tablets 30-60 min before intimacy, Disp: 30 tablet, Rfl: 11    albuterol (PROAIR HFA/PROVENTIL HFA/VENTOLIN HFA) 108 (90 Base) MCG/ACT inhaler, Inhale 1-2 puffs into the lungs every  4 hours as needed for shortness of breath, wheezing or other (persistent cough) Please follow up in clinic for next refill., Disp: 18 g, Rfl: 0    fluticasone-vilanterol (BREO ELLIPTA) 200-25 MCG/INH inhaler, Inhale 1 puff into the lungs daily (Patient not taking: Reported on 7/16/2024), Disp: 60 each, Rfl: 5  Allergies   Allergen Reactions    Advil [Ibuprofen Micronized] Other (See Comments)     Tight chest    Asa [Aspirin] Nausea and Vomiting     Asthma - tight chest    Hazelnuts [Nuts] Other (See Comments)     Chest tight. Positive SPT. Tolerates other tree nuts and peanut.    Naprosyn [Naproxen] GI Disturbance     Stomach     Tylenol [Acetaminophen] Nausea         EXAM:   /88   Pulse 62   Wt 91.2 kg (201 lb)   SpO2 98%   BMI 30.56 kg/m      Constitutional:       General: He is not in acute distress.     Appearance: Normal appearance. He is not ill-appearing.   HENT:      Head: Normocephalic and atraumatic.      Nose: Nose normal. No congestion or rhinorrhea.      Mouth/Throat:      Mouth: Mucous membranes are moist.      Pharynx: Oropharynx is clear. No posterior oropharyngeal erythema.   Eyes:      General:         Right eye: No discharge.         Left eye: No discharge.   Cardiovascular:      Rate and Rhythm: Normal rate and regular rhythm.      Heart sounds: Normal heart sounds.   Pulmonary:      Effort: Pulmonary effort is normal.      Breath sounds: Normal breath sounds. No wheezing or rhonchi.   Skin:     General: Skin is warm.      Findings: No erythema or rash.   Neurological:      General: No focal deficit present.      Mental Status: He is alert. Mental status is at baseline.   Psychiatric:         Mood and Affect: Mood normal.         Behavior: Behavior normal.      WORKUP: Spirometry has improved since previous but does still show evidence of mild obstruction.  FEV1 improved from 56% predicted to 71% predicted.  His FVC improved from 79 to 91% predicted.    ASSESSMENT/PLAN:  Marco FERRELL  Wil[place] is a 58 year old male seen today for ongoing evaluation of severe persistent asthma as well as chronic rhinosinusitis with polyps and aspirin allergy consistent with Samter's triad.  Trelegy had been used and may have contributed to some of his SVT or tachycardia.  He is tolerating Symbicort 2 puffs twice daily quite well.  If he misses a few doses he will have chest tightness.  His lung function has improved on spirometry.  He is not having any sinus symptoms at this time.  He does still take Singulair.  He is uncertain if symptoms will change off of Singulair.    We discussed discontinuation of Singulair if he feels comfortable.  He also asked for refill of his sildenafil.    Symbicort 2 puffs twice daily.  A coupon was given to him to reduce the cost.  Dupixent 300 mg q 14 days  We will not start Trelegy again in the future due to the possibility of increasing his tachycardia.  He will consider stopping Singulair.  Sildenafil refilled, I did recommend that he have a primary care provider to assist with refills for this medication and other possible needs.    Follow-up in 6 months      Thank you for allowing me to participate in the care of Marco Stovall.      I spent 35 minutes on the date of the encounter doing chart review, history and exam, documentation and further coordination as noted above exclusive of separately reported interpretations    Serg Sanchez MD  Allergy/Immunology  [facility]

## 2024-10-01 NOTE — PROGRESS NOTES
Marco Stovall comes into clinic today at the request of Dr. Sanchez, Ordering Provider for spirometry       This service provided today was under the supervising provider of the day Dr. Sanchez, who was available if needed.    Joseph Thomas, RT

## 2024-10-03 LAB
EXPTIME-PRE: 10.06 SEC
FEF2575-%PRED-PRE: 36 %
FEF2575-PRE: 1.02 L/SEC
FEF2575-PRED: 2.81 L/SEC
FEFMAX-%PRED-PRE: 84 %
FEFMAX-PRE: 7.5 L/SEC
FEFMAX-PRED: 8.92 L/SEC
FEV1-%PRED-PRE: 71 %
FEV1-PRE: 2.29 L
FEV1FEV6-PRE: 65 %
FEV1FEV6-PRED: 79 %
FEV1FVC-PRE: 62 %
FEV1FVC-PRED: 79 %
FIFMAX-PRE: 7.26 L/SEC
FVC-%PRED-PRE: 91 %
FVC-PRE: 3.71 L
FVC-PRED: 4.05 L

## 2024-12-02 ENCOUNTER — MYC REFILL (OUTPATIENT)
Dept: ALLERGY | Facility: CLINIC | Age: 59
End: 2024-12-02
Payer: COMMERCIAL

## 2024-12-02 DIAGNOSIS — N52.9 ERECTILE DYSFUNCTION, UNSPECIFIED ERECTILE DYSFUNCTION TYPE: ICD-10-CM

## 2024-12-02 DIAGNOSIS — J32.0 CHRONIC MAXILLARY SINUSITIS: ICD-10-CM

## 2024-12-02 DIAGNOSIS — J45.50 SEVERE PERSISTENT ASTHMA WITHOUT COMPLICATION (H): ICD-10-CM

## 2024-12-02 DIAGNOSIS — J33.9 NASAL POLYPOSIS: ICD-10-CM

## 2024-12-03 RX ORDER — SILDENAFIL CITRATE 20 MG/1
TABLET ORAL
Qty: 30 TABLET | Refills: 11 | OUTPATIENT
Start: 2024-12-03

## 2024-12-03 RX ORDER — DUPILUMAB 300 MG/2ML
300 INJECTION, SOLUTION SUBCUTANEOUS
Qty: 2 ML | Refills: 6 | Status: SHIPPED | OUTPATIENT
Start: 2024-12-03

## 2024-12-03 NOTE — TELEPHONE ENCOUNTER
Requested Prescriptions   Pending Prescriptions Disp Refills    dupilumab (DUPIXENT) 300 MG/2ML prefilled syringe 2 mL 6     Sig: Inject 2 mLs (300 mg) subcutaneously every 14 days.       There is no refill protocol information for this order      Refused Prescriptions Disp Refills    sildenafil (REVATIO) 20 MG tablet 30 tablet 11     Sig: Take 1-5 tablets 30-60 min before intimacy       Erectile Dysfuction Protocol Passed - 12/3/2024  7:23 AM        Passed - Absence of nitrates on medication list        Passed - Absence of Alpha Blockers on Med list        Passed - Medication is active on med list        Passed - Recent (12 mo) or future (90 days) visit within the authorizing provider's specialty     The patient must have completed an in-person or virtual visit within the past 12 months or has a future visit scheduled within the next 90 days with the authorizing provider s specialty.  Urgent care and e-visits do not qualify as an office visit for this protocol.          Passed - Medication indicated for associated diagnosis     Medication is associated with one or more of the following diagnoses:   Benign prostatic hyperplasia  Erectile dysfunction  Female sexual arousal disorder  Pulmonary hypertension  Raynaud s  Sexual Dysfunction            Passed - Patient is age 18 or older       Antihypertensive Agents Passed - 12/3/2024  7:23 AM        Passed - Most recent blood pressure under 140/90 in past 12 months     BP Readings from Last 3 Encounters:   10/01/24 134/88   07/16/24 131/81   06/27/24 133/84       No data recorded            Passed - Medication is active on med list        Passed - Has GFR on file in past 12 months and most recent value is normal        Passed - Recent (12 mo) or future (90 days) visit within the authorizing provider's specialty     The patient must have completed an in-person or virtual visit within the past 12 months or has a future visit scheduled within the next 90 days with the  authorizing provider s specialty.  Urgent care and e-visits do not qualify as an office visit for this protocol.          Passed - Patient is age 18 or older            Last Written Prescription Date:  08/07/2024  Last Fill Quantity: 2 mL,  # refills: 6   Last office visit: 10/1/2024 ; last virtual visit: Visit date not found with prescribing provider:  Dr. Serg Sanchez   Future Office Visit:  04/08/2025    Filling Rx per passed [facility] protocol.    Sildenafil refill denied, per last chart note Dr. Sanchez said patient should follow up with PCP for this medication.    Reba Lomax RN

## 2025-03-19 ENCOUNTER — TELEPHONE (OUTPATIENT)
Dept: CARDIOLOGY | Facility: CLINIC | Age: 60
End: 2025-03-19
Payer: COMMERCIAL

## 2025-03-19 NOTE — TELEPHONE ENCOUNTER
Patient Contacted for the patient to call back and schedule the following:    Appointment type: RTN EP  Provider: DOMINGO RIVERA MCKNIGHT  Return date: 7/16/2025 OR AFTER  Specialty phone number: [phone] OPT 1  Additional appointment(s) needed: N/A  Additonal Notes: ANNUAL FOLLOW-UP WITH EP ROULA.

## 2025-04-04 ASSESSMENT — ASTHMA QUESTIONNAIRES
QUESTION_4 LAST FOUR WEEKS HOW OFTEN HAVE YOU USED YOUR RESCUE INHALER OR NEBULIZER MEDICATION (SUCH AS ALBUTEROL): NOT AT ALL
QUESTION_1 LAST FOUR WEEKS HOW MUCH OF THE TIME DID YOUR ASTHMA KEEP YOU FROM GETTING AS MUCH DONE AT WORK, SCHOOL OR AT HOME: NONE OF THE TIME
QUESTION_2 LAST FOUR WEEKS HOW OFTEN HAVE YOU HAD SHORTNESS OF BREATH: NOT AT ALL
ACT_TOTALSCORE: 25
QUESTION_5 LAST FOUR WEEKS HOW WOULD YOU RATE YOUR ASTHMA CONTROL: COMPLETELY CONTROLLED
QUESTION_3 LAST FOUR WEEKS HOW OFTEN DID YOUR ASTHMA SYMPTOMS (WHEEZING, COUGHING, SHORTNESS OF BREATH, CHEST TIGHTNESS OR PAIN) WAKE YOU UP AT NIGHT OR EARLIER THAN USUAL IN THE MORNING: NOT AT ALL

## 2025-04-08 ENCOUNTER — OFFICE VISIT (OUTPATIENT)
Dept: ALLERGY | Facility: CLINIC | Age: 60
End: 2025-04-08
Attending: INTERNAL MEDICINE
Payer: COMMERCIAL

## 2025-04-08 VITALS
OXYGEN SATURATION: 98 % | HEART RATE: 68 BPM | SYSTOLIC BLOOD PRESSURE: 136 MMHG | WEIGHT: 204 LBS | BODY MASS INDEX: 31.02 KG/M2 | DIASTOLIC BLOOD PRESSURE: 87 MMHG

## 2025-04-08 DIAGNOSIS — J45.909 SAMTER'S TRIAD: ICD-10-CM

## 2025-04-08 DIAGNOSIS — J32.4 CHRONIC PANSINUSITIS: ICD-10-CM

## 2025-04-08 DIAGNOSIS — J33.9 SAMTER'S TRIAD: ICD-10-CM

## 2025-04-08 DIAGNOSIS — J32.0 CHRONIC MAXILLARY SINUSITIS: ICD-10-CM

## 2025-04-08 DIAGNOSIS — J33.9 NASAL POLYPOSIS: ICD-10-CM

## 2025-04-08 DIAGNOSIS — Z88.6 SAMTER'S TRIAD: ICD-10-CM

## 2025-04-08 DIAGNOSIS — J45.50 SEVERE PERSISTENT ASTHMA WITHOUT COMPLICATION (H): ICD-10-CM

## 2025-04-08 DIAGNOSIS — N52.9 ERECTILE DYSFUNCTION, UNSPECIFIED ERECTILE DYSFUNCTION TYPE: ICD-10-CM

## 2025-04-08 PROCEDURE — 99214 OFFICE O/P EST MOD 30 MIN: CPT | Performed by: INTERNAL MEDICINE

## 2025-04-08 PROCEDURE — 3075F SYST BP GE 130 - 139MM HG: CPT | Performed by: INTERNAL MEDICINE

## 2025-04-08 PROCEDURE — 3079F DIAST BP 80-89 MM HG: CPT | Performed by: INTERNAL MEDICINE

## 2025-04-08 RX ORDER — BUDESONIDE AND FORMOTEROL FUMARATE DIHYDRATE 160; 4.5 UG/1; UG/1
2 AEROSOL RESPIRATORY (INHALATION) 2 TIMES DAILY
Qty: 10.2 G | Refills: 5 | Status: SHIPPED | OUTPATIENT
Start: 2025-04-08

## 2025-04-08 RX ORDER — MONTELUKAST SODIUM 10 MG/1
10 TABLET ORAL AT BEDTIME
Qty: 90 TABLET | Refills: 3 | Status: SHIPPED | OUTPATIENT
Start: 2025-04-08

## 2025-04-08 RX ORDER — DUPILUMAB 300 MG/2ML
300 INJECTION, SOLUTION SUBCUTANEOUS
Qty: 2 ML | Refills: 6 | Status: SHIPPED | OUTPATIENT
Start: 2025-04-08

## 2025-04-08 NOTE — LETTER
4/8/2025      Marco Stovall  [address] [facility] [place] [address]      Dear Colleague,    Thank you for referring your patient, Marco Stovall, to the [facility] [place]. Please see a copy of my visit note below.    Marco Stovall was seen in the Allergy Clinic at [facility].    Marco Stovall is a 59 year old male being seen today for ongoing evaluation of chronic maxillary sinusitis, and asthma.  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    Since the last visit the patient has been feeling great.  His tachycardia has resolved as of the last appointment.    Symbicort 2 puffs once daily continues.  If he misses doses of the Symbicort for 2 days he will start noticing chest tightness.    Dupixent is quite helpful in regards to his sinus symptoms as well as his asthma.  He prefers the syringe over the pen.  He started Dupixent again June 2024.    FEV1 was 56% of predicted previously and repeat spirometry showed an improvement with an FEV1 of 71%.  His FVC improved from 79 to 91%.    He is known to have numerous environmental allergies.    He does continue to take Singulair intermittently.  Approximately twice a week.    He does wonder if the Dupixent is contributing to decreased sense of energy or fatigue.    SINUS CT 5/7/2024:  Impression: Findings of chronic pansinusitis where there may be  underlying polyps.    PAST ASTHMA HISTORY:    He has severe persistent asthma, recurrent nasal polyps with 5 nasal surgeries, the last being in 2019.    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    He was placed on Dupixent for approximately 1 year in 2020 which worked quite well.  Based on medical chart review, Dupixent became unavailable because of insurance coverage.      Skin testing was positive to dust mite, grass, trees, weeds and molds in the past.    Past Medical  History:   Diagnosis Date     Mild intermittent asthma without complication 3/24/2016     Polyp of nasal cavity      Family History   Problem Relation Age of Onset     Hypertension Mother      Thyroid Disease Mother         hypothyroidism     Hypertension Father      Diabetes Maternal Grandfather      Thyroid Disease Daughter         hypothyroidism     Asthma No family hx of      C.A.D. No family hx of      Cerebrovascular Disease No family hx of      Breast Cancer No family hx of      Cancer - colorectal No family hx of      Prostate Cancer No family hx of      Past Surgical History:   Procedure Laterality Date     ENT SURGERY       ETHMOIDECTOMY  6/16/2014    Procedure: ETHMOIDECTOMY;  Surgeon: Jimbo Yang MD;  Location: [facility] OR     HERNIORRHAPHY UMBILICAL N/A 11/21/2022    Procedure: HERNIORRHAPHY, UMBILICAL, OPEN;  Surgeon: Denise Espinoza MD;  Location: [facility] OR     OPTICAL TRACKING SYSTEM ENDOSCOPIC SINUS SURGERY Bilateral 12/3/2019    Procedure: Bilateral endoscopic maxillary antrostomies with tissue removal, bilateral endoscopic total ethmoidectomies, bilateral endoscopic sphenoidotomies, bilateral endoscopic frontal sinusotomies, bilateral endoscopic nasal polypectomy, image guidance;  Surgeon: Josh Swartz MD;  Location:  OR     SURGICAL HISTORY OF -   2010    Nasal Polyp; 3 total surgeries: 1997         Current Outpatient Medications:      albuterol (PROAIR HFA/PROVENTIL HFA/VENTOLIN HFA) 108 (90 Base) MCG/ACT inhaler, Inhale 1-2 puffs into the lungs every 4 hours as needed for shortness of breath, wheezing or other (persistent cough) Please follow up in clinic for next refill., Disp: 18 g, Rfl: 0     budesonide-formoterol (SYMBICORT/BREYNA) 160-4.5 MCG/ACT Inhaler, Inhale 2 puffs into the lungs 2 times daily., Disp: 10.2 g, Rfl: 5     dupilumab (DUPIXENT) 300 MG/2ML prefilled syringe, Inject 2 mLs (300 mg) subcutaneously every 14 days., Disp: 2 mL, Rfl: 6     fluticasone (FLONASE) 50  MCG/ACT nasal spray, Spray 2 sprays into both nostrils daily, Disp: 16 g, Rfl: 4     montelukast (SINGULAIR) 10 MG tablet, Take 1 tablet (10 mg) by mouth at bedtime., Disp: 90 tablet, Rfl: 3     Multiple Vitamin (MULTIVITAMIN) per tablet, Take 1 tablet by mouth daily, Disp: 100 tablet, Rfl: 12     sildenafil (REVATIO) 20 MG tablet, Take 1-5 tablets 30-60 min before intimacy, Disp: 30 tablet, Rfl: 11  Allergies   Allergen Reactions     Advil [Ibuprofen Micronized] Other (See Comments)     Tight chest     Asa [Aspirin] Nausea and Vomiting     Asthma - tight chest     Hazelnuts [Nuts] Other (See Comments)     Chest tight. Positive SPT. Tolerates other tree nuts and peanut.     Naprosyn [Naproxen] GI Disturbance     Stomach      Tylenol [Acetaminophen] Nausea         EXAM:   /87   Pulse 68   Wt 92.5 kg (204 lb)   SpO2 98%   BMI 31.02 kg/m      Constitutional:       General: He is not in acute distress.     Appearance: Normal appearance. He is not ill-appearing.   HENT:      Head: Normocephalic and atraumatic.      Nose: Nose normal. No congestion or rhinorrhea.      Mouth/Throat:      Mouth: Mucous membranes are moist.      Pharynx: Oropharynx is clear. No posterior oropharyngeal erythema.   Eyes:      General:         Right eye: No discharge.         Left eye: No discharge.   Cardiovascular:      Rate and Rhythm: Normal rate and regular rhythm.      Heart sounds: Normal heart sounds.   Pulmonary:      Effort: Pulmonary effort is normal.      Breath sounds: Normal breath sounds. No wheezing or rhonchi.   Skin:     General: Skin is warm.      Findings: No erythema or rash.   Neurological:      General: No focal deficit present.      Mental Status: He is alert. Mental status is at baseline.   Psychiatric:         Mood and Affect: Mood normal.         Behavior: Behavior normal.        ASSESSMENT/PLAN:  Marco Stovall is a 59 year old male seen today for ongoing evaluation of chronic sinusitis with nasal  polyps, Samter's triad, and moderate persistent asthma.  He does have SVT.  He has seen cardiology.  He wonders if Dupixent is contributing to decreased sense of energy.  Because of this concern he may try to increase the duration of time in between the Dupixent dosing.  This is not a frequent concern I hear from patients on Dupixent and is not listed as a frequent side effect in Lexidrug.  He has had an excellent clinical response of both his chronic sinusitis as well as his asthma.  His lung function has increased as documented with pulmonary function testing.    Symbicort 2 puffs once to twice daily  Dupixent 300 mg q 14 days, may increase time between Dupixent shots if doing well  Singulair as needed  Will recheck breathing tests at follow-up in 6 months.    Follow-up in 6 months      Thank you for allowing me to participate in the care of Marco Stovall.      I spent 30 minutes on the date of the encounter doing chart review, history and exam, documentation and further coordination as noted above exclusive of separately reported interpretations    Serg Sanchez MD  Allergy/Immunology  [facility]      Again, thank you for allowing me to participate in the care of your patient.        Sincerely,        Serg Sanchez MD    Electronically signed

## 2025-04-08 NOTE — PROGRESS NOTES
Marco Stovall was seen in the Allergy Clinic at [facility].    Marco Stovall is a 59 year old male being seen today for ongoing evaluation of chronic maxillary sinusitis, and asthma.  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    Since the last visit the patient has been feeling great.  His tachycardia has resolved as of the last appointment.    Symbicort 2 puffs once daily continues.  If he misses doses of the Symbicort for 2 days he will start noticing chest tightness.    Dupixent is quite helpful in regards to his sinus symptoms as well as his asthma.  He prefers the syringe over the pen.  He started Dupixent again June 2024.    FEV1 was 56% of predicted previously and repeat spirometry showed an improvement with an FEV1 of 71%.  His FVC improved from 79 to 91%.    He is known to have numerous environmental allergies.    He does continue to take Singulair intermittently.  Approximately twice a week.    He does wonder if the Dupixent is contributing to decreased sense of energy or fatigue.    SINUS CT 5/7/2024:  Impression: Findings of chronic pansinusitis where there may be  underlying polyps.    PAST ASTHMA HISTORY:    He has severe persistent asthma, recurrent nasal polyps with 5 nasal surgeries, the last being in 2019.    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    He was placed on Dupixent for approximately 1 year in 2020 which worked quite well.  Based on medical chart review, Dupixent became unavailable because of insurance coverage.      Skin testing was positive to dust mite, grass, trees, weeds and molds in the past.    Past Medical History:   Diagnosis Date    Mild intermittent asthma without complication 3/24/2016    Polyp of nasal cavity      Family History   Problem Relation Age of Onset    Hypertension Mother     Thyroid Disease Mother         hypothyroidism    Hypertension Father      Diabetes Maternal Grandfather     Thyroid Disease Daughter         hypothyroidism    Asthma No family hx of     C.A.D. No family hx of     Cerebrovascular Disease No family hx of     Breast Cancer No family hx of     Cancer - colorectal No family hx of     Prostate Cancer No family hx of      Past Surgical History:   Procedure Laterality Date    ENT SURGERY      ETHMOIDECTOMY  6/16/2014    Procedure: ETHMOIDECTOMY;  Surgeon: Jimbo Yang MD;  Location: [facility] OR    HERNIORRHAPHY UMBILICAL N/A 11/21/2022    Procedure: HERNIORRHAPHY, UMBILICAL, OPEN;  Surgeon: Denise Espinoza MD;  Location:  OR    OPTICAL TRACKING SYSTEM ENDOSCOPIC SINUS SURGERY Bilateral 12/3/2019    Procedure: Bilateral endoscopic maxillary antrostomies with tissue removal, bilateral endoscopic total ethmoidectomies, bilateral endoscopic sphenoidotomies, bilateral endoscopic frontal sinusotomies, bilateral endoscopic nasal polypectomy, image guidance;  Surgeon: Josh Swartz MD;  Location:  OR    SURGICAL HISTORY OF -   2010    Nasal Polyp; 3 total surgeries: 1997         Current Outpatient Medications:     albuterol (PROAIR HFA/PROVENTIL HFA/VENTOLIN HFA) 108 (90 Base) MCG/ACT inhaler, Inhale 1-2 puffs into the lungs every 4 hours as needed for shortness of breath, wheezing or other (persistent cough) Please follow up in clinic for next refill., Disp: 18 g, Rfl: 0    budesonide-formoterol (SYMBICORT/BREYNA) 160-4.5 MCG/ACT Inhaler, Inhale 2 puffs into the lungs 2 times daily., Disp: 10.2 g, Rfl: 5    dupilumab (DUPIXENT) 300 MG/2ML prefilled syringe, Inject 2 mLs (300 mg) subcutaneously every 14 days., Disp: 2 mL, Rfl: 6    fluticasone (FLONASE) 50 MCG/ACT nasal spray, Spray 2 sprays into both nostrils daily, Disp: 16 g, Rfl: 4    montelukast (SINGULAIR) 10 MG tablet, Take 1 tablet (10 mg) by mouth at bedtime., Disp: 90 tablet, Rfl: 3    Multiple Vitamin (MULTIVITAMIN) per tablet, Take 1 tablet by mouth daily, Disp: 100 tablet, Rfl:  12    sildenafil (REVATIO) 20 MG tablet, Take 1-5 tablets 30-60 min before intimacy, Disp: 30 tablet, Rfl: 11  Allergies   Allergen Reactions    Advil [Ibuprofen Micronized] Other (See Comments)     Tight chest    Asa [Aspirin] Nausea and Vomiting     Asthma - tight chest    Hazelnuts [Nuts] Other (See Comments)     Chest tight. Positive SPT. Tolerates other tree nuts and peanut.    Naprosyn [Naproxen] GI Disturbance     Stomach     Tylenol [Acetaminophen] Nausea         EXAM:   /87   Pulse 68   Wt 92.5 kg (204 lb)   SpO2 98%   BMI 31.02 kg/m      Constitutional:       General: He is not in acute distress.     Appearance: Normal appearance. He is not ill-appearing.   HENT:      Head: Normocephalic and atraumatic.      Nose: Nose normal. No congestion or rhinorrhea.      Mouth/Throat:      Mouth: Mucous membranes are moist.      Pharynx: Oropharynx is clear. No posterior oropharyngeal erythema.   Eyes:      General:         Right eye: No discharge.         Left eye: No discharge.   Cardiovascular:      Rate and Rhythm: Normal rate and regular rhythm.      Heart sounds: Normal heart sounds.   Pulmonary:      Effort: Pulmonary effort is normal.      Breath sounds: Normal breath sounds. No wheezing or rhonchi.   Skin:     General: Skin is warm.      Findings: No erythema or rash.   Neurological:      General: No focal deficit present.      Mental Status: He is alert. Mental status is at baseline.   Psychiatric:         Mood and Affect: Mood normal.         Behavior: Behavior normal.        ASSESSMENT/PLAN:  Marco Stovall is a 59 year old male seen today for ongoing evaluation of chronic sinusitis with nasal polyps, Samter's triad, and moderate persistent asthma.  He does have SVT.  He has seen cardiology.  He wonders if Dupixent is contributing to decreased sense of energy.  Because of this concern he may try to increase the duration of time in between the Dupixent dosing.  This is not a frequent concern I  hear from patients on Dupixent and is not listed as a frequent side effect in Lexidrug.  He has had an excellent clinical response of both his chronic sinusitis as well as his asthma.  His lung function has increased as documented with pulmonary function testing.    Symbicort 2 puffs once to twice daily  Dupixent 300 mg q 14 days, may increase time between Dupixent shots if doing well  Singulair as needed  Will recheck breathing tests at follow-up in 6 months.    Follow-up in 6 months      Thank you for allowing me to participate in the care of Marco Stovall.      I spent 30 minutes on the date of the encounter doing chart review, history and exam, documentation and further coordination as noted above exclusive of separately reported interpretations    Serg Sanchez MD  Allergy/Immunology  [facility]

## 2025-04-08 NOTE — PATIENT INSTRUCTIONS
Symbicort 2 puffs once to twice daily  Dupixent 300 mg q 14 days, may increase time between Dupixent shots if doing well  Singulair as needed

## 2025-05-05 ENCOUNTER — OFFICE VISIT (OUTPATIENT)
Dept: FAMILY MEDICINE | Facility: CLINIC | Age: 60
End: 2025-05-05
Payer: COMMERCIAL

## 2025-05-05 VITALS
RESPIRATION RATE: 16 BRPM | OXYGEN SATURATION: 100 % | HEART RATE: 80 BPM | WEIGHT: 204.2 LBS | HEIGHT: 68 IN | TEMPERATURE: 97.8 F | BODY MASS INDEX: 30.95 KG/M2 | DIASTOLIC BLOOD PRESSURE: 76 MMHG | SYSTOLIC BLOOD PRESSURE: 120 MMHG

## 2025-05-05 DIAGNOSIS — Z23 NEED FOR VACCINATION: ICD-10-CM

## 2025-05-05 DIAGNOSIS — Z12.5 PROSTATE CANCER SCREENING: ICD-10-CM

## 2025-05-05 DIAGNOSIS — Z13.1 SCREENING FOR DIABETES MELLITUS: ICD-10-CM

## 2025-05-05 DIAGNOSIS — Z00.00 ROUTINE GENERAL MEDICAL EXAMINATION AT A HEALTH CARE FACILITY: Primary | ICD-10-CM

## 2025-05-05 DIAGNOSIS — M54.42 ACUTE LEFT-SIDED LOW BACK PAIN WITH LEFT-SIDED SCIATICA: ICD-10-CM

## 2025-05-05 DIAGNOSIS — E78.5 HYPERLIPIDEMIA LDL GOAL <130: ICD-10-CM

## 2025-05-05 DIAGNOSIS — Z12.11 SCREEN FOR COLON CANCER: ICD-10-CM

## 2025-05-05 LAB
CHOLEST SERPL-MCNC: 229 MG/DL
FASTING STATUS PATIENT QL REPORTED: YES
FASTING STATUS PATIENT QL REPORTED: YES
GLUCOSE SERPL-MCNC: 100 MG/DL (ref 70–99)
HDLC SERPL-MCNC: 44 MG/DL
LDLC SERPL CALC-MCNC: 141 MG/DL
NONHDLC SERPL-MCNC: 185 MG/DL
PSA SERPL DL<=0.01 NG/ML-MCNC: 0.48 NG/ML (ref 0–3.5)
TRIGL SERPL-MCNC: 219 MG/DL

## 2025-05-05 PROCEDURE — 36415 COLL VENOUS BLD VENIPUNCTURE: CPT | Performed by: FAMILY MEDICINE

## 2025-05-05 PROCEDURE — 99396 PREV VISIT EST AGE 40-64: CPT | Mod: 25 | Performed by: FAMILY MEDICINE

## 2025-05-05 PROCEDURE — 90471 IMMUNIZATION ADMIN: CPT | Performed by: FAMILY MEDICINE

## 2025-05-05 PROCEDURE — 82947 ASSAY GLUCOSE BLOOD QUANT: CPT | Performed by: FAMILY MEDICINE

## 2025-05-05 PROCEDURE — 3074F SYST BP LT 130 MM HG: CPT | Performed by: FAMILY MEDICINE

## 2025-05-05 PROCEDURE — 99214 OFFICE O/P EST MOD 30 MIN: CPT | Mod: 25 | Performed by: FAMILY MEDICINE

## 2025-05-05 PROCEDURE — G0103 PSA SCREENING: HCPCS | Performed by: FAMILY MEDICINE

## 2025-05-05 PROCEDURE — 80061 LIPID PANEL: CPT | Performed by: FAMILY MEDICINE

## 2025-05-05 PROCEDURE — 90677 PCV20 VACCINE IM: CPT | Performed by: FAMILY MEDICINE

## 2025-05-05 PROCEDURE — 3078F DIAST BP <80 MM HG: CPT | Performed by: FAMILY MEDICINE

## 2025-05-05 PROCEDURE — G2211 COMPLEX E/M VISIT ADD ON: HCPCS | Performed by: FAMILY MEDICINE

## 2025-05-05 PROCEDURE — 90472 IMMUNIZATION ADMIN EACH ADD: CPT | Performed by: FAMILY MEDICINE

## 2025-05-05 PROCEDURE — 90746 HEPB VACCINE 3 DOSE ADULT IM: CPT | Performed by: FAMILY MEDICINE

## 2025-05-05 PROCEDURE — 90715 TDAP VACCINE 7 YRS/> IM: CPT | Performed by: FAMILY MEDICINE

## 2025-05-05 RX ORDER — GABAPENTIN 300 MG/1
CAPSULE ORAL
Qty: 90 CAPSULE | Refills: 1 | Status: SHIPPED | OUTPATIENT
Start: 2025-05-05

## 2025-05-05 SDOH — HEALTH STABILITY: PHYSICAL HEALTH: ON AVERAGE, HOW MANY DAYS PER WEEK DO YOU ENGAGE IN MODERATE TO STRENUOUS EXERCISE (LIKE A BRISK WALK)?: 0 DAYS

## 2025-05-05 SDOH — HEALTH STABILITY: PHYSICAL HEALTH: ON AVERAGE, HOW MANY MINUTES DO YOU ENGAGE IN EXERCISE AT THIS LEVEL?: 0 MIN

## 2025-05-05 ASSESSMENT — SOCIAL DETERMINANTS OF HEALTH (SDOH)
HOW OFTEN DO YOU GET TOGETHER WITH FRIENDS OR RELATIVES?: THREE TIMES A WEEK
HOW OFTEN DO YOU GET TOGETHER WITH FRIENDS OR RELATIVES?: THREE TIMES A WEEK

## 2025-05-05 ASSESSMENT — ENCOUNTER SYMPTOMS
BACK PAIN: 1
LEG PAIN: 1

## 2025-05-05 NOTE — PROGRESS NOTES
Preventive Care Visit  [facility]  Marlo Card MD, Family Medicine  May 5, 2025      Assessment & Plan     (Z00.00) Routine general medical examination at a health care facility  (primary encounter diagnosis)  Comment: Negative screening exam; up-to-date on preventive services.   Plan: Pneumococcal 20 Valent Conjugate (PCV20),         HEPATITIS B, ADULT 20+ (ENGERIX-B/RECOMBIVAX         HB), Fecal colorectal cancer screen FIT -         Future (S+30), TDAP 10-64Y (ADACEL,BOOSTRIX),         Prostate Specific Antigen Screen, Glucose        Follow-up in 1 year.     (E78.5) Hyperlipidemia LDL goal <130  Comment: non-fasting.   Plan: Lipid panel reflex to direct LDL Non-fasting            (M54.42) Acute left-sided low back pain with left-sided sciatica  Comment: onset 1 month ago. symptoms suggest either sciatica of sciatic nerve, or radiculopathy of S1 or L5.   Plan: Physical Therapy  Referral, gabapentin        (NEURONTIN) 300 MG capsule       Dosing and side effects of gabapentin were discussed with the patient. Consider virtual visit after 3-4 weeks if only a partial response. Return in about 61 weeks (around 7/6/2026) for recheck if symptoms fail to resolve by then. (Consider ortho referral or imaging)    (Z13.1) Screening for diabetes mellitus  Comment:   Plan: Glucose            (Z12.5) Prostate cancer screening  Comment: indications for screening discussed with the patient   Plan: Prostate Specific Antigen Screen            (Z12.11) Screen for colon cancer  Comment:    Plan: Fecal colorectal cancer screen FIT - Future         (S+30)        I encouraged him to do the FIT yearly to make it more effective.     (Z23) Need for vaccination  Comment: Hep B #2  Plan: Pneumococcal 20 Valent Conjugate (PCV20),         HEPATITIS B, ADULT 20+ (ENGERIX-B/RECOMBIVAX         HB), TDAP 10-64Y (ADACEL,BOOSTRIX)            BMI  Estimated body mass index is 31.05 kg/m  as calculated from  "the following:    Height as of this encounter: 1.727 m (5' 8\").    Weight as of this encounter: 92.6 kg (204 lb 3.2 oz).   Weight management plan: exercise recommendations, as summarized in the AVS. Patient reports that his sciatic pain has been interfering with how much he can exercise.     Counseling  Appropriate preventive services were addressed with this patient via screening, questionnaire, or discussion as appropriate for fall prevention, nutrition, physical activity, Tobacco-use cessation, social engagement, weight loss and cognition.  Checklist reviewing preventive services available has been given to the patient.  Reviewed patient's diet, addressing concerns and/or questions.     Erin Lara is a 59 year old, presenting for the following:  Back Pain, Leg Pain, and Physical        5/5/2025     3:48 PM   Additional Questions   Roomed by Elizabeth   Accompanied by self         5/5/2025     3:48 PM   Patient Reported Additional Medications   Patient reports taking the following new medications no          History of Present Illness       Back Pain:  He presents for follow up of back pain. Patient's back pain is a recurring problem.  Location of back pain:  Left lower back, left buttock and left hip  Description of back pain: burning and dull ache  Back pain spreads: left buttocks    Since patient first noticed back pain, pain is: always present, but gets better and worse  Does back pain interfere with his job:  No       Reason for visit:  Back and Leg pain  Symptom onset:  3-4 weeks ago  Symptoms include:  Deep low level pain in lower back and leg  Symptom intensity:  Moderate  Symptom progression:  Staying the same  Had these symptoms before:  No  What makes it worse:  To much activity  What makes it better:  Haet/Ice     Advance Care Planning    Discussed advance care planning with patient; informed AVS has link to [facility] [employer]. Information packet handed to the patient.         5/5/2025   General " Health   How would you rate your overall physical health? Good   Feel stress (tense, anxious, or unable to sleep) Not at all         5/5/2025   Nutrition   Three or more servings of calcium each day? Yes   Diet: Regular (no restrictions)   How many servings of fruit and vegetables per day? (!) 2-3   How many sweetened beverages each day? 0-1         5/5/2025   Exercise   Days per week of moderate/strenous exercise 0 days   Average minutes spent exercising at this level 0 min   (!) EXERCISE CONCERN      5/5/2025   Social Factors   Frequency of gathering with friends or relatives Three times a week   Worry food won't last until get money to buy more No   Food not last or not have enough money for food? No   Do you have housing? (Housing is defined as stable permanent housing and does not include staying outside in a car, in a tent, in an abandoned building, in an overnight shelter, or couch-surfing.) Yes   Are you worried about losing your housing? No   Lack of transportation? No   Unable to get utilities (heat,electricity)? No         5/5/2025   Fall Risk   Fallen 2 or more times in the past year? No     No    Trouble with walking or balance? No     No        Proxy-reported    Multiple values from one day are sorted in reverse-chronological order          5/5/2025   Dental   Dentist two times every year? Yes           Today's PHQ-2 Score:       4/8/2025     4:25 PM   PHQ-2 ( 1999 [employer])   Q1: Little interest or pleasure in doing things 0   Q2: Feeling down, depressed or hopeless 0   PHQ-2 Score 0         5/5/2025   Substance Use   Alcohol more than 3/day or more than 7/wk No   Do you use any other substances recreationally? No     Social History     Tobacco Use    Smoking status: Never     Passive exposure: Never    Smokeless tobacco: Never   Vaping Use    Vaping status: Never Used   Substance Use Topics    Alcohol use: Yes     Alcohol/week: 4.0 standard drinks of alcohol     Types: 4 Standard drinks or equivalent  "per week    Drug use: No           5/5/2025   STI Screening   New sexual partner(s) since last STI/HIV test? No   Last PSA: No results found for: \"PSA\"  ASCVD Risk   The 10-year ASCVD risk score (Zohaib PABON, et al., 2019) is: 8.3%    Values used to calculate the score:      Age: 59 years      Sex: Male      Is Non- : No      Diabetic: No      Tobacco smoker: No      Systolic Blood Pressure: 120 mmHg      Is BP treated: No      HDL Cholesterol: 49 mg/dL      Total Cholesterol: 249 mg/dL       Reviewed and updated as needed this visit by Provider   Tobacco  Allergies   Problems  Med Hx  Surg Hx  Fam Hx          Review of Systems       Objective    Exam  /76   Pulse 80   Temp 97.8  F (36.6  C) (Temporal)   Resp 16   Ht 1.727 m (5' 8\")   Wt 92.6 kg (204 lb 3.2 oz)   SpO2 100%   BMI 31.05 kg/m     Estimated body mass index is 31.05 kg/m  as calculated from the following:    Height as of this encounter: 1.727 m (5' 8\").    Weight as of this encounter: 92.6 kg (204 lb 3.2 oz).    Physical Exam  GENERAL: alert and no distress  EYES: Eyes grossly normal to inspection, PERRL and conjunctivae and sclerae normal  HENT: ear canals and TM's normal, nose and mouth without ulcers or lesions  NECK: no adenopathy, no asymmetry, masses, or scars  RESP: lungs clear to auscultation - no rales, rhonchi or wheezes  CV: regular rate and rhythm, normal S1 S2, no S3 or S4, no murmur, click or rub, no peripheral edema  ABDOMEN: soft, nontender, no hepatosplenomegaly, no masses and bowel sounds normal   (male): normal male genitalia without lesions or urethral discharge, no hernia  MS: no gross musculoskeletal defects noted, no edema  SKIN: no suspicious lesions or rashes  NEURO: Normal strength and tone, mentation intact and speech normal  PSYCH: mentation appears normal, affect normal/bright    This document serves as a record of the services and decisions personally performed and made by " Dr. Card. It was created on his behalf by Kalee Berrios, a trained medical scribe. The creation of this document is based the provider's statements to the medical scribe.  Kalee Berrios, 4:21 PM         Signed Electronically by: Marlo Card MD

## 2025-05-05 NOTE — PROGRESS NOTES
"  {PROVIDER CHARTING PREFERENCE:788175}    Erin Lara is a 59 year old, presenting for the following health issues:  Back Pain and Leg Pain      5/5/2025     3:48 PM   Additional Questions   Roomed by Elizabeth   Accompanied by self         5/5/2025     3:48 PM   Patient Reported Additional Medications   Patient reports taking the following new medications no     Back Pain   Associated symptoms include leg pain.   Leg Pain    History of Present Illness       Back Pain:  He presents for follow up of back pain. Patient's back pain is a recurring problem.  Location of back pain:  Left lower back, left buttock and left hip  Description of back pain: burning and dull ache  Back pain spreads: left buttocks    Since patient first noticed back pain, pain is: always present, but gets better and worse  Does back pain interfere with his job:  No       Reason for visit:  Back and Leg pain  Symptom onset:  3-4 weeks ago  Symptoms include:  Deep low level pain in lower back and leg  Symptom intensity:  Moderate  Symptom progression:  Staying the same  Had these symptoms before:  No  What makes it worse:  To much activity  What makes it better:  Haet/Ice         {MA/LPN/RN Pre-Provider Visit Orders- hCG/UA/Strep (Optional):241118}  {SUPERLIST (Optional):455391}  {additonal problems for provider to add (Optional):262595}    {ROS Picklists (Optional):797524}      Objective    BP (!) 148/92 (BP Location: Left arm, Patient Position: Sitting, Cuff Size: Adult Regular)   Pulse 80   Temp 97.8  F (36.6  C) (Temporal)   Resp 16   Ht 1.727 m (5' 8\")   Wt 92.6 kg (204 lb 3.2 oz)   SpO2 100%   BMI 31.05 kg/m    Body mass index is 31.05 kg/m .  Physical Exam   {Exam List (Optional):448127}    {Diagnostic Test Results (Optional):215476}        Signed Electronically by: Marlo Card MD  {Email feedback regarding this note to [email or web address]   :[id number]}  "

## 2025-05-05 NOTE — PATIENT INSTRUCTIONS
Patient Education   Preventive Care Advice   This is general advice given by our system to help you stay healthy. However, your care team may have specific advice just for you. Please talk to your care team about your preventive care needs.  Nutrition  Eat 5 or more servings of fruits and vegetables each day.  Try wheat bread, brown rice and whole grain pasta (instead of white bread, rice, and pasta).  Get enough calcium and vitamin D. Check the label on foods and aim for 100% of the RDA (recommended daily allowance).  Lifestyle  Exercise at least 150 minutes each week  (30 minutes a day, 5 days a week).  Do muscle strengthening activities 2 days a week. These help control your weight and prevent disease.  No smoking.  Wear sunscreen to prevent skin cancer.  Have a dental exam and cleaning every 6 months.  Yearly exams  See your health care team every year to talk about:  Any changes in your health.  Any medicines your care team has prescribed.  Preventive care, family planning, and ways to prevent chronic diseases.  Shots (vaccines)   HPV shots (up to age 26), if you've never had them before.  Hepatitis B shots (up to age 59), if you've never had them before.  COVID-19 shot: Get this shot when it's due.  Flu shot: Get a flu shot every year.  Tetanus shot: Get a tetanus shot every 10 years.  Pneumococcal, hepatitis A, and RSV shots: Ask your care team if you need these based on your risk.  Shingles shot (for age 50 and up)  General health tests  Diabetes screening:  Starting at age 35, Get screened for diabetes at least every 3 years.  If you are younger than age 35, ask your care team if you should be screened for diabetes.  Cholesterol test: At age 39, start having a cholesterol test every 5 years, or more often if advised.  Bone density scan (DEXA): At age 50, ask your care team if you should have this scan for osteoporosis (brittle bones).  Hepatitis C: Get tested at least once in your life.  STIs (sexually  transmitted infections)  Before age 24: Ask your care team if you should be screened for STIs.  After age 24: Get screened for STIs if you're at risk. You are at risk for STIs (including HIV) if:  You are sexually active with more than one person.  You don't use condoms every time.  You or a partner was diagnosed with a sexually transmitted infection.  If you are at risk for HIV, ask about PrEP medicine to prevent HIV.  Get tested for HIV at least once in your life, whether you are at risk for HIV or not.  Cancer screening tests  Cervical cancer screening: If you have a cervix, begin getting regular cervical cancer screening tests starting at age 21.  Breast cancer scan (mammogram): If you've ever had breasts, begin having regular mammograms starting at age 40. This is a scan to check for breast cancer.  Colon cancer screening: It is important to start screening for colon cancer at age 45.  Have a colonoscopy test every 10 years (or more often if you're at risk) Or, ask your provider about stool tests like a FIT test every year or Cologuard test every 3 years.  To learn more about your testing options, visit:   .  For help making a decision, visit:   https://bit.ly/qg58914.  Prostate cancer screening test: If you have a prostate, ask your care team if a prostate cancer screening test (PSA) at age 55 is right for you.  Lung cancer screening: If you are a current or former smoker ages 50 to 80, ask your care team if ongoing lung cancer screenings are right for you.  For informational purposes only. Not to replace the advice of your health care provider. Copyright   2023 [email or web address]. All rights reserved. Clinically reviewed by the [facility]itions Program. [employer] 091886 - REV 01/24.

## 2025-05-05 NOTE — NURSING NOTE
Prior to immunization administration, verified patients identity using patient s name and date of birth. Please see Immunization Activity for additional information.     Screening Questionnaire for Adult Immunization    Are you sick today?   No   Do you have allergies to medications, food, a vaccine component or latex?   No   Have you ever had a serious reaction after receiving a vaccination?   No   Do you have a long-term health problem with heart, lung, kidney, or metabolic disease (e.g., diabetes), asthma, a blood disorder, no spleen, complement component deficiency, a cochlear implant, or a spinal fluid leak?  Are you on long-term aspirin therapy?   No   Do you have cancer, leukemia, HIV/AIDS, or any other immune system problem?   No   Do you have a parent, brother, or sister with an immune system problem?   No   In the past 3 months, have you taken medications that affect  your immune system, such as prednisone, other steroids, or anticancer drugs; drugs for the treatment of rheumatoid arthritis, Crohn s disease, or psoriasis; or have you had radiation treatments?   No   Have you had a seizure, or a brain or other nervous system problem?   No   During the past year, have you received a transfusion of blood or blood    products, or been given immune (gamma) globulin or antiviral drug?   No   For women: Are you pregnant or is there a chance you could become       pregnant during the next month?   No   Have you received any vaccinations in the past 4 weeks?   No     Immunization questionnaire answers were all negative.      Patient instructed to remain in clinic for 15 minutes afterwards, and to report any adverse reactions.     Screening performed by Maria C Cerda MA on 5/5/2025 at 5:09 PM.

## 2025-07-29 DIAGNOSIS — J32.0 CHRONIC MAXILLARY SINUSITIS: ICD-10-CM

## 2025-07-29 DIAGNOSIS — J33.9 NASAL POLYPOSIS: ICD-10-CM

## 2025-07-29 DIAGNOSIS — J45.50 SEVERE PERSISTENT ASTHMA WITHOUT COMPLICATION (H): ICD-10-CM

## 2025-07-29 RX ORDER — DUPILUMAB 300 MG/2ML
300 INJECTION, SOLUTION SUBCUTANEOUS
Qty: 2 ML | Refills: 6 | Status: SHIPPED | OUTPATIENT
Start: 2025-07-29

## 2025-07-29 NOTE — TELEPHONE ENCOUNTER
Requested Prescriptions   Pending Prescriptions Disp Refills    dupilumab (DUPIXENT) 300 MG/2ML prefilled syringe 2 mL 6     Sig: Inject 2 mLs (300 mg) subcutaneously every 14 days.       There is no refill protocol information for this order        Last Written Prescription Date:  04/08/25  Last Fill Quantity: 2 ml,  # refills: 6   Last office visit: 4/8/2025 ; last virtual visit: Visit date not found with prescribing provider:  Serg Sanchez MD    Future Office Visit:  10/09/25

## 2025-07-29 NOTE — TELEPHONE ENCOUNTER
Requested Prescriptions   Signed Prescriptions Disp Refills    dupilumab (DUPIXENT) 300 MG/2ML prefilled syringe 2 mL 6     Sig: Inject 2 mLs (300 mg) subcutaneously every 14 days.       There is no refill protocol information for this order        Patient was last seen 4/8/2025 with Dr. Sanchez. Patient should continue Dupixent every 14 days. Refill was sent to the pharmacy.     DAXA KoehlerN, RN

## (undated) DEVICE — SYR 50ML CATH TIP W/O NDL 309620

## (undated) DEVICE — ESU ELEC BLADE 2.75" COATED/INSULATED E1455

## (undated) DEVICE — SU ETHILON 3-0 FS-1 18" 669H

## (undated) DEVICE — SU PROLENE 0 MO-6 30" 8418H

## (undated) DEVICE — Device

## (undated) DEVICE — ENDO SHEATH STORZ SHARPSITE ENDOSCRUB 0DEG 4MM 1912000

## (undated) DEVICE — DRSG STERI STRIP 1/2X4" R1547

## (undated) DEVICE — SU VICRYL 4-0 PS-2 18" UND J496H

## (undated) DEVICE — SYR BULB IRRIG 50ML LATEX FREE 0035280

## (undated) DEVICE — SPECIMEN CONTAINER 4OZ

## (undated) DEVICE — SU VICRYL 2-0 SH 27" UND J417H

## (undated) DEVICE — SOL WATER IRRIG 1000ML BOTTLE 07139-09

## (undated) DEVICE — NDL 19GA 1.5"

## (undated) DEVICE — MASK CONE SHAPED 00152

## (undated) DEVICE — NDL SPINAL 25GA 3.5" QUINCKE 405180

## (undated) DEVICE — TUBING SUCTION 10'X3/16" N510

## (undated) DEVICE — ENDO SHEATH ENDOSCRUB 45DEG 4MM 1912015

## (undated) DEVICE — SUCTION CANISTER MEDIVAC LINER 1500ML W/LID 65651-515

## (undated) DEVICE — DRSG NASOPORE FRAG FIRM 8CM 5400-020-008

## (undated) DEVICE — PACK MINOR SBA15MIFSE

## (undated) DEVICE — BLADE SINUS RAD12 CVD 4MM FUSION ROTATE W/TRACKING

## (undated) DEVICE — NDL 22GA 1.5"

## (undated) DEVICE — SPONGE COTTONOID 1/2X3" 80-1407

## (undated) DEVICE — PREP CHLORAPREP 26ML TINTED ORANGE  260815

## (undated) DEVICE — DRAPE LAP W/ARMBOARD 29410

## (undated) DEVICE — GLOVE PROTEXIS W/NEU-THERA 7.5  2D73TE75

## (undated) DEVICE — SU CHROMIC 4-0 FS-2 27" 635H

## (undated) DEVICE — DRAPE SPLIT EENT 76X124" 3X28" 9447

## (undated) DEVICE — TRACKER ENT OTS INSTRUMENT FUSION 9733533

## (undated) DEVICE — GLOVE PROTEXIS W/NEU-THERA 6.0  2D73TE60

## (undated) DEVICE — DRSG TEGADERM 4X4 3/4" 1626W

## (undated) DEVICE — TRACKER ENT OTS PATIENT FUSION 9733534

## (undated) DEVICE — SOL NACL 0.9% INJ 1000ML BAG 07983-09

## (undated) DEVICE — ESU PENCIL SMOKE EVAC W/ROCKER SWITCH 0703-047-000

## (undated) DEVICE — PREP POVIDONE IODINE SWABS X3

## (undated) DEVICE — DECANTER TRANSFER DEVICE 2008S

## (undated) RX ORDER — FENTANYL CITRATE 50 UG/ML
INJECTION, SOLUTION INTRAMUSCULAR; INTRAVENOUS
Status: DISPENSED
Start: 2019-12-03

## (undated) RX ORDER — EPINEPHRINE NASAL SOLUTION 1 MG/ML
SOLUTION NASAL
Status: DISPENSED
Start: 2019-12-03

## (undated) RX ORDER — HYDROMORPHONE HYDROCHLORIDE 2 MG/ML
INJECTION, SOLUTION INTRAMUSCULAR; INTRAVENOUS; SUBCUTANEOUS
Status: DISPENSED
Start: 2019-12-03

## (undated) RX ORDER — PROPOFOL 10 MG/ML
INJECTION, EMULSION INTRAVENOUS
Status: DISPENSED
Start: 2022-11-21

## (undated) RX ORDER — GABAPENTIN 300 MG/1
CAPSULE ORAL
Status: DISPENSED
Start: 2019-12-03

## (undated) RX ORDER — OXYCODONE HYDROCHLORIDE 5 MG/1
TABLET ORAL
Status: DISPENSED
Start: 2022-11-21

## (undated) RX ORDER — PROPOFOL 10 MG/ML
INJECTION, EMULSION INTRAVENOUS
Status: DISPENSED
Start: 2019-12-03

## (undated) RX ORDER — FENTANYL CITRATE 50 UG/ML
INJECTION, SOLUTION INTRAMUSCULAR; INTRAVENOUS
Status: DISPENSED
Start: 2022-11-21

## (undated) RX ORDER — DEXAMETHASONE SODIUM PHOSPHATE 4 MG/ML
INJECTION, SOLUTION INTRA-ARTICULAR; INTRALESIONAL; INTRAMUSCULAR; INTRAVENOUS; SOFT TISSUE
Status: DISPENSED
Start: 2019-12-03

## (undated) RX ORDER — CIPROFLOXACIN AND DEXAMETHASONE 3; 1 MG/ML; MG/ML
SUSPENSION/ DROPS AURICULAR (OTIC)
Status: DISPENSED
Start: 2019-12-03

## (undated) RX ORDER — GINSENG 100 MG
CAPSULE ORAL
Status: DISPENSED
Start: 2019-12-03

## (undated) RX ORDER — OXYCODONE HYDROCHLORIDE 5 MG/1
TABLET ORAL
Status: DISPENSED
Start: 2019-12-03

## (undated) RX ORDER — CEFAZOLIN SODIUM 1 G/3ML
INJECTION, POWDER, FOR SOLUTION INTRAMUSCULAR; INTRAVENOUS
Status: DISPENSED
Start: 2022-11-21

## (undated) RX ORDER — ONDANSETRON 2 MG/ML
INJECTION INTRAMUSCULAR; INTRAVENOUS
Status: DISPENSED
Start: 2019-12-03

## (undated) RX ORDER — LIDOCAINE HYDROCHLORIDE AND EPINEPHRINE 10; 10 MG/ML; UG/ML
INJECTION, SOLUTION INFILTRATION; PERINEURAL
Status: DISPENSED
Start: 2019-12-03

## (undated) RX ORDER — LIDOCAINE HYDROCHLORIDE 20 MG/ML
INJECTION, SOLUTION EPIDURAL; INFILTRATION; INTRACAUDAL; PERINEURAL
Status: DISPENSED
Start: 2022-11-21

## (undated) RX ORDER — CEFAZOLIN SODIUM 1 G/3ML
INJECTION, POWDER, FOR SOLUTION INTRAMUSCULAR; INTRAVENOUS
Status: DISPENSED
Start: 2019-12-03

## (undated) RX ORDER — ACETAMINOPHEN 325 MG/1
TABLET ORAL
Status: DISPENSED
Start: 2019-12-03

## (undated) RX ORDER — LIDOCAINE HYDROCHLORIDE 20 MG/ML
INJECTION, SOLUTION INFILTRATION; PERINEURAL
Status: DISPENSED
Start: 2019-12-03

## (undated) RX ORDER — ACETAMINOPHEN 325 MG/1
TABLET ORAL
Status: DISPENSED
Start: 2022-11-21

## (undated) RX ORDER — OXYMETAZOLINE HYDROCHLORIDE 0.05 G/100ML
SPRAY NASAL
Status: DISPENSED
Start: 2019-12-03